# Patient Record
Sex: MALE | Race: WHITE | NOT HISPANIC OR LATINO | Employment: OTHER | URBAN - METROPOLITAN AREA
[De-identification: names, ages, dates, MRNs, and addresses within clinical notes are randomized per-mention and may not be internally consistent; named-entity substitution may affect disease eponyms.]

---

## 2017-12-01 ENCOUNTER — TRANSFERRED RECORDS (OUTPATIENT)
Dept: HEALTH INFORMATION MANAGEMENT | Facility: CLINIC | Age: 57
End: 2017-12-01

## 2020-03-18 ENCOUNTER — OFFICE VISIT (OUTPATIENT)
Dept: FAMILY MEDICINE | Facility: CLINIC | Age: 60
End: 2020-03-18
Payer: COMMERCIAL

## 2020-03-18 VITALS
WEIGHT: 251.6 LBS | OXYGEN SATURATION: 96 % | SYSTOLIC BLOOD PRESSURE: 133 MMHG | TEMPERATURE: 98.4 F | DIASTOLIC BLOOD PRESSURE: 81 MMHG | RESPIRATION RATE: 16 BRPM | BODY MASS INDEX: 37.26 KG/M2 | HEIGHT: 69 IN | HEART RATE: 93 BPM

## 2020-03-18 DIAGNOSIS — I10 HYPERTENSION GOAL BP (BLOOD PRESSURE) < 140/90: ICD-10-CM

## 2020-03-18 DIAGNOSIS — E66.01 MORBID OBESITY (H): ICD-10-CM

## 2020-03-18 DIAGNOSIS — I83.93 ASYMPTOMATIC VARICOSE VEINS OF BOTH LOWER EXTREMITIES: ICD-10-CM

## 2020-03-18 DIAGNOSIS — E78.5 HYPERLIPIDEMIA LDL GOAL <100: ICD-10-CM

## 2020-03-18 DIAGNOSIS — F95.8 BEHAVIORAL TIC: ICD-10-CM

## 2020-03-18 DIAGNOSIS — N52.8 OTHER MALE ERECTILE DYSFUNCTION: ICD-10-CM

## 2020-03-18 DIAGNOSIS — Z23 NEED FOR DIPHTHERIA-TETANUS-PERTUSSIS (TDAP) VACCINE: ICD-10-CM

## 2020-03-18 DIAGNOSIS — E11.42 TYPE 2 DIABETES MELLITUS WITH DIABETIC POLYNEUROPATHY, WITHOUT LONG-TERM CURRENT USE OF INSULIN (H): Primary | ICD-10-CM

## 2020-03-18 DIAGNOSIS — Z23 NEED FOR SHINGLES VACCINE: ICD-10-CM

## 2020-03-18 DIAGNOSIS — F32.4 MAJOR DEPRESSIVE DISORDER WITH SINGLE EPISODE, IN PARTIAL REMISSION (H): ICD-10-CM

## 2020-03-18 PROBLEM — E11.9 TYPE 2 DIABETES MELLITUS WITHOUT COMPLICATION, WITHOUT LONG-TERM CURRENT USE OF INSULIN (H): Status: ACTIVE | Noted: 2020-03-18

## 2020-03-18 LAB
ALBUMIN SERPL-MCNC: 3.5 G/DL (ref 3.4–5)
ALP SERPL-CCNC: 133 U/L (ref 40–150)
ALT SERPL W P-5'-P-CCNC: 28 U/L (ref 0–70)
ANION GAP SERPL CALCULATED.3IONS-SCNC: 6 MMOL/L (ref 3–14)
AST SERPL W P-5'-P-CCNC: 22 U/L (ref 0–45)
BILIRUB SERPL-MCNC: 0.5 MG/DL (ref 0.2–1.3)
BUN SERPL-MCNC: 19 MG/DL (ref 7–30)
CALCIUM SERPL-MCNC: 9.1 MG/DL (ref 8.5–10.1)
CHLORIDE SERPL-SCNC: 104 MMOL/L (ref 94–109)
CHOLEST SERPL-MCNC: 194 MG/DL
CO2 SERPL-SCNC: 28 MMOL/L (ref 20–32)
CREAT SERPL-MCNC: 0.9 MG/DL (ref 0.66–1.25)
CREAT UR-MCNC: 154 MG/DL
GFR SERPL CREATININE-BSD FRML MDRD: >90 ML/MIN/{1.73_M2}
GLUCOSE SERPL-MCNC: 158 MG/DL (ref 70–99)
HBA1C MFR BLD: 8.8 % (ref 0–5.6)
HDLC SERPL-MCNC: 42 MG/DL
LDLC SERPL CALC-MCNC: 110 MG/DL
MICROALBUMIN UR-MCNC: 11 MG/L
MICROALBUMIN/CREAT UR: 6.95 MG/G CR (ref 0–17)
NONHDLC SERPL-MCNC: 152 MG/DL
POTASSIUM SERPL-SCNC: 4.2 MMOL/L (ref 3.4–5.3)
PROT SERPL-MCNC: 7.5 G/DL (ref 6.8–8.8)
SODIUM SERPL-SCNC: 138 MMOL/L (ref 133–144)
TRIGL SERPL-MCNC: 209 MG/DL
TSH SERPL DL<=0.005 MIU/L-ACNC: 1.11 MU/L (ref 0.4–4)

## 2020-03-18 PROCEDURE — 90472 IMMUNIZATION ADMIN EACH ADD: CPT | Performed by: FAMILY MEDICINE

## 2020-03-18 PROCEDURE — 90715 TDAP VACCINE 7 YRS/> IM: CPT | Performed by: FAMILY MEDICINE

## 2020-03-18 PROCEDURE — 36415 COLL VENOUS BLD VENIPUNCTURE: CPT | Performed by: FAMILY MEDICINE

## 2020-03-18 PROCEDURE — 80061 LIPID PANEL: CPT | Performed by: FAMILY MEDICINE

## 2020-03-18 PROCEDURE — 80053 COMPREHEN METABOLIC PANEL: CPT | Performed by: FAMILY MEDICINE

## 2020-03-18 PROCEDURE — 83036 HEMOGLOBIN GLYCOSYLATED A1C: CPT | Performed by: FAMILY MEDICINE

## 2020-03-18 PROCEDURE — 90750 HZV VACC RECOMBINANT IM: CPT | Performed by: FAMILY MEDICINE

## 2020-03-18 PROCEDURE — 99204 OFFICE O/P NEW MOD 45 MIN: CPT | Mod: 25 | Performed by: FAMILY MEDICINE

## 2020-03-18 PROCEDURE — 84443 ASSAY THYROID STIM HORMONE: CPT | Performed by: FAMILY MEDICINE

## 2020-03-18 PROCEDURE — 82043 UR ALBUMIN QUANTITATIVE: CPT | Performed by: FAMILY MEDICINE

## 2020-03-18 PROCEDURE — 90471 IMMUNIZATION ADMIN: CPT | Performed by: FAMILY MEDICINE

## 2020-03-18 RX ORDER — DULAGLUTIDE 1.5 MG/.5ML
1.5 INJECTION, SOLUTION SUBCUTANEOUS
COMMUNITY
Start: 2020-03-06 | End: 2020-03-18

## 2020-03-18 RX ORDER — BUPROPION HYDROCHLORIDE 300 MG/1
300 TABLET ORAL EVERY MORNING
Qty: 90 TABLET | Refills: 1 | Status: SHIPPED | OUTPATIENT
Start: 2020-03-18 | End: 2020-09-11

## 2020-03-18 RX ORDER — LOSARTAN POTASSIUM 100 MG/1
100 TABLET ORAL DAILY
COMMUNITY
Start: 2020-03-06 | End: 2020-03-18

## 2020-03-18 RX ORDER — GLYBURIDE 5 MG/1
10 TABLET ORAL
Qty: 180 TABLET | Refills: 1 | Status: SHIPPED | OUTPATIENT
Start: 2020-03-18 | End: 2020-09-11

## 2020-03-18 RX ORDER — LOSARTAN POTASSIUM 100 MG/1
100 TABLET ORAL DAILY
Qty: 90 TABLET | Refills: 1 | Status: SHIPPED | OUTPATIENT
Start: 2020-03-18 | End: 2020-09-11

## 2020-03-18 RX ORDER — LOSARTAN POTASSIUM AND HYDROCHLOROTHIAZIDE 12.5; 1 MG/1; MG/1
1 TABLET ORAL DAILY
COMMUNITY
End: 2020-03-18

## 2020-03-18 RX ORDER — GLYBURIDE 5 MG/1
10 TABLET ORAL
COMMUNITY
Start: 2020-03-06 | End: 2020-03-18

## 2020-03-18 RX ORDER — ATORVASTATIN CALCIUM 40 MG/1
40 TABLET, FILM COATED ORAL EVERY EVENING
Qty: 90 TABLET | Refills: 1 | Status: SHIPPED | OUTPATIENT
Start: 2020-03-18 | End: 2020-11-24

## 2020-03-18 RX ORDER — OXYCODONE AND ACETAMINOPHEN 5; 325 MG/1; MG/1
1-2 TABLET ORAL
COMMUNITY
Start: 2018-12-04 | End: 2020-03-18

## 2020-03-18 RX ORDER — DULAGLUTIDE 1.5 MG/.5ML
1.5 INJECTION, SOLUTION SUBCUTANEOUS
Qty: 6 ML | Refills: 1 | Status: SHIPPED | OUTPATIENT
Start: 2020-03-18 | End: 2020-09-16

## 2020-03-18 RX ORDER — BUPROPION HYDROCHLORIDE 300 MG/1
300 TABLET ORAL EVERY MORNING
COMMUNITY
Start: 2020-03-06 | End: 2020-03-18

## 2020-03-18 RX ORDER — TADALAFIL 5 MG/1
5 TABLET ORAL DAILY
Qty: 90 TABLET | Refills: 1 | Status: SHIPPED | OUTPATIENT
Start: 2020-03-18 | End: 2021-09-30

## 2020-03-18 RX ORDER — DESVENLAFAXINE 100 MG/1
100 TABLET, EXTENDED RELEASE ORAL DAILY
COMMUNITY
Start: 2020-03-06 | End: 2020-03-18

## 2020-03-18 RX ORDER — ATORVASTATIN CALCIUM 40 MG/1
40 TABLET, FILM COATED ORAL EVERY EVENING
COMMUNITY
Start: 2020-02-06 | End: 2020-03-18

## 2020-03-18 RX ORDER — DESVENLAFAXINE 100 MG/1
100 TABLET, EXTENDED RELEASE ORAL DAILY
Qty: 90 TABLET | Refills: 1 | Status: SHIPPED | OUTPATIENT
Start: 2020-03-18 | End: 2020-09-11

## 2020-03-18 SDOH — HEALTH STABILITY: MENTAL HEALTH: HOW OFTEN DO YOU HAVE 6 OR MORE DRINKS ON ONE OCCASION?: WEEKLY

## 2020-03-18 SDOH — HEALTH STABILITY: MENTAL HEALTH: HOW MANY STANDARD DRINKS CONTAINING ALCOHOL DO YOU HAVE ON A TYPICAL DAY?: 1 OR 2

## 2020-03-18 ASSESSMENT — ANXIETY QUESTIONNAIRES
6. BECOMING EASILY ANNOYED OR IRRITABLE: NOT AT ALL
5. BEING SO RESTLESS THAT IT IS HARD TO SIT STILL: NOT AT ALL
2. NOT BEING ABLE TO STOP OR CONTROL WORRYING: NOT AT ALL
7. FEELING AFRAID AS IF SOMETHING AWFUL MIGHT HAPPEN: NOT AT ALL
IF YOU CHECKED OFF ANY PROBLEMS ON THIS QUESTIONNAIRE, HOW DIFFICULT HAVE THESE PROBLEMS MADE IT FOR YOU TO DO YOUR WORK, TAKE CARE OF THINGS AT HOME, OR GET ALONG WITH OTHER PEOPLE: SOMEWHAT DIFFICULT
3. WORRYING TOO MUCH ABOUT DIFFERENT THINGS: NOT AT ALL
GAD7 TOTAL SCORE: 3
1. FEELING NERVOUS, ANXIOUS, OR ON EDGE: NEARLY EVERY DAY

## 2020-03-18 ASSESSMENT — PATIENT HEALTH QUESTIONNAIRE - PHQ9
5. POOR APPETITE OR OVEREATING: NOT AT ALL
SUM OF ALL RESPONSES TO PHQ QUESTIONS 1-9: 2

## 2020-03-18 ASSESSMENT — MIFFLIN-ST. JEOR: SCORE: 1945.59

## 2020-03-18 ASSESSMENT — PAIN SCALES - GENERAL: PAINLEVEL: NO PAIN (0)

## 2020-03-18 NOTE — PROGRESS NOTES
"Chan Rodriguez is a 60 year old male who presents to clinic today for the following health issues:    HPI   New Patient/Transfer of Care  Pt doen't have any issues today     {HIST REVIEW/ LINKS 2 (Optional):662086}    {Additional problems for the provider to add (optional):112579}  Reviewed and updated as needed this visit by Provider         Review of Systems   {ROS COMP (Optional):427312}      Objective    There were no vitals taken for this visit.  There is no height or weight on file to calculate BMI.  Physical Exam   {Exam List (Optional):724260}    {Diagnostic Test Results (Optional):594088::\"Diagnostic Test Results:\",\"Labs reviewed in Epic\"}        {PROVIDER CHARTING PREFERENCE:942653}      "

## 2020-03-18 NOTE — NURSING NOTE
Prior to immunization administration, verified patients identity using patient s name and date of birth. Please see Immunization Activity for additional information.     Screening Questionnaire for Adult Immunization    Are you sick today?   No   Do you have allergies to medications, food, a vaccine component or latex?   No   Have you ever had a serious reaction after receiving a vaccination?   No   Do you have a long-term health problem with heart, lung, kidney, or metabolic disease (e.g., diabetes), asthma, a blood disorder, no spleen, complement component deficiency, a cochlear implant, or a spinal fluid leak?  Are you on long-term aspirin therapy?   Yes   Do you have cancer, leukemia, HIV/AIDS, or any other immune system problem?   No   Do you have a parent, brother, or sister with an immune system problem?   No   In the past 3 months, have you taken medications that affect  your immune system, such as prednisone, other steroids, or anticancer drugs; drugs for the treatment of rheumatoid arthritis, Crohn s disease, or psoriasis; or have you had radiation treatments?   No   Have you had a seizure, or a brain or other nervous system problem?   No   During the past year, have you received a transfusion of blood or blood    products, or been given immune (gamma) globulin or antiviral drug?   No   For women: Are you pregnant or is there a chance you could become       pregnant during the next month?   No   Have you received any vaccinations in the past 4 weeks?   No     Immunization questionnaire was positive for at least one answer.  Notified Dr. Phil Stubbs.        Per orders of Dr. Phil Stubbs, injection of TDaP and Shingrix given by Em Winters MA. Patient instructed to remain in clinic for 15 minutes afterwards, and to report any adverse reaction to me immediately.       Screening performed by Em Winters MA on 3/18/2020 at 4:45 PM.

## 2020-03-18 NOTE — PROGRESS NOTES
Subjective     Christopher Rodriguez is a 60 year old male who presents to clinic today for the following health issues:    HPI   New Patient/Transfer of Care  Diabetes Follow-up      How often are you checking your blood sugar? Not at all    What concerns do you have today about your diabetes? None     Do you have any of these symptoms? (Select all that apply)  Numbness in feet     Was out of Trulicity for a few months due to cost.  Has been back on it since January.              Hyperlipidemia Follow-Up      Are you regularly taking any medication or supplement to lower your cholesterol?   Yes- atorvastatin in the morning    Are you having muscle aches or other side effects that you think could be caused by your cholesterol lowering medication?  No    Hypertension Follow-up      Do you check your blood pressure regularly outside of the clinic? No     Are you following a low salt diet? No    Are your blood pressures ever more than 140 on the top number (systolic) OR more   than 90 on the bottom number (diastolic), for example 140/90? N    BP Readings from Last 2 Encounters:   03/18/20 133/81     No results found for: A1C, LDL    Depression Followup    How are you doing with your depression since your last visit? Improved     Are you having other symptoms that might be associated with depression? No    Have you had a significant life event?  No     Are you feeling anxious or having panic attacks?   No    Do you have any concerns with your use of alcohol or other drugs? No    Social History     Tobacco Use     Smoking status: Never Smoker     Smokeless tobacco: Never Used   Substance Use Topics     Alcohol use: Yes     Drinks per session: 1 or 2     Binge frequency: Weekly     Comment: sometimes      Drug use: Never     No flowsheet data found.  No flowsheet data found.    In the past two weeks have you had thoughts of suicide or self-harm?  No.    Do you have concerns about your personal safety or the safety of others?    "No    Suicide Assessment Five-step Evaluation and Treatment (SAFE-T)      Tic - present for months. Not changing and worse during stress.    ED - using daily cialis.  Not currently sexually active but stable on this medication.      Reviewed and updated as needed this visit by Provider  Tobacco  Allergies  Meds  Problems  Med Hx  Surg Hx  Fam Hx  Soc Hx          Review of Systems   ROS COMP: Constitutional, HEENT, cardiovascular, pulmonary, GI, , musculoskeletal, neuro, skin, endocrine and psych systems are negative, except as otherwise noted.      Objective    /81 (BP Location: Left arm, Patient Position: Chair, Cuff Size: Adult Large)   Pulse 93   Temp 98.4  F (36.9  C) (Oral)   Resp 16   Ht 1.759 m (5' 9.25\")   Wt 114.1 kg (251 lb 9.6 oz)   SpO2 96%   BMI 36.89 kg/m    Body mass index is 36.89 kg/m .  Physical Exam   GENERAL: healthy, alert and no distress  EYES: Eyes grossly normal to inspection, PERRL and conjunctivae and sclerae normal  HENT: ear canals and TM's normal, nose and mouth without ulcers or lesions  NECK: no adenopathy, no asymmetry, masses, or scars and thyroid normal to palpation  RESP: lungs clear to auscultation - no rales, rhonchi or wheezes  CV: regular rate and rhythm, normal S1 S2, no S3 or S4, no murmur, click or rub, no peripheral edema and peripheral pulses strong  ABDOMEN: soft, nontender, no hepatosplenomegaly, no masses and bowel sounds normal  MS: no gross musculoskeletal defects noted, no edema  SKIN: no suspicious lesions or rashes and varicosities - ankles  NEURO: Normal strength and tone, sensory exam grossly normal, mentation intact and nonsymmetrical facial tics noted.  PSYCH: mentation appears normal and affect flat  Diabetic foot exam: normal DP and PT pulses, no trophic changes or ulcerative lesions, normal sensory exam and normal monofilament exam    Diagnostic Test Results:  Labs reviewed in Epic        Assessment & Plan     1. Type 2 diabetes " mellitus with diabetic polyneuropathy, without long-term current use of insulin (H)  Uncertain control - check labs and refill current medications for now.  - Lipid panel reflex to direct LDL Non-fasting  - glyBURIDE (DIABETA /MICRONASE) 5 MG tablet; Take 2 tablets (10 mg) by mouth daily (with breakfast)  Dispense: 180 tablet; Refill: 1  - metFORMIN (GLUCOPHAGE) 1000 MG tablet; Take 1 tablet (1,000 mg) by mouth 2 times daily (with meals)  Dispense: 180 tablet; Refill: 1  - TRULICITY 1.5 MG/0.5ML pen; Inject 1.5 mg Subcutaneous every 7 days  Dispense: 6 mL; Refill: 1  - Comprehensive metabolic panel  - Hemoglobin A1c  - TSH with free T4 reflex  - Albumin Random Urine Quantitative with Creat Ratio    2. Morbid obesity (H)  Low carb diet recommended    3. Major depressive disorder with single episode, in partial remission (H)  Stable - continue current treatment  - buPROPion (WELLBUTRIN XL) 300 MG 24 hr tablet; Take 1 tablet (300 mg) by mouth every morning  Dispense: 90 tablet; Refill: 1  - desvenlafaxine (PRISTIQ) 100 MG 24 hr tablet; Take 1 tablet (100 mg) by mouth daily  Dispense: 90 tablet; Refill: 1    4. Hypertension goal BP (blood pressure) < 140/90  Controlled - continue current treatment and check labs  - losartan (COZAAR) 100 MG tablet; Take 1 tablet (100 mg) by mouth daily  Dispense: 90 tablet; Refill: 1  - Comprehensive metabolic panel    5. Hyperlipidemia LDL goal <100  Stable - refill and check labs. Recommended this be taken in the evening.  - Lipid panel reflex to direct LDL Non-fasting  - atorvastatin (LIPITOR) 40 MG tablet; Take 1 tablet (40 mg) by mouth every evening  Dispense: 90 tablet; Refill: 1  - Comprehensive metabolic panel    6. Behavioral tic  Monitor    7. Other male erectile dysfunction  Stable - refill and check labs.  - tadalafil (CIALIS) 5 MG tablet; Take 1 tablet (5 mg) by mouth daily  Dispense: 90 tablet; Refill: 1  - Comprehensive metabolic panel    8. Asymptomatic varicose veins of  "both lower extremities  Monitor    9. Need for diphtheria-tetanus-pertussis (Tdap) vaccine    - TDAP, IM (10 - 64 YRS) - Adacel    10. Need for shingles vaccine    - ZOSTER VACCINE RECOMBINANT ADJUVANTED IM NJX     BMI:   Estimated body mass index is 36.89 kg/m  as calculated from the following:    Height as of this encounter: 1.759 m (5' 9.25\").    Weight as of this encounter: 114.1 kg (251 lb 9.6 oz).   Weight management plan: Discussed healthy diet and exercise guidelines    The uses and side effects, including black box warnings as appropriate, were discussed in detail.  All patient questions were answered.  The patient was instructed to call immediately if any side effects developed.     Return in about 6 months (around 9/18/2020).    Su Stubbs MD  [facility]      "

## 2020-03-18 NOTE — PATIENT INSTRUCTIONS
At [facility], we strive to deliver an exceptional experience to you, every time we see you. If you receive a survey, please complete it as we do value your feedback.  If you have MyChart, you can expect to receive results automatically within 24 hours of their completion.  Your provider will send a note interpreting your results as well.   If you do not have MyChart, you should receive your results in about a week by mail.    Your care team:                            Family Medicine Internal Medicine   MD Jose Esparza MD Shantel Branch-Fleming, MD Katya Georgiev PA-C Megan Hill, APRIRIS Ndiaye, MD Pediatrics   Mikey Shipley, PATAMMY Blake, MD Layla Wen APRN CNP   MD Marjorie Aguilar MD Deborah Mielke, MD Kim Thein, APRN [facility]      Clinic hours: Monday - Thursday 7 am-7 pm; Fridays 7 am-5 pm.   Urgent care: Monday - Friday 11 am-9 pm; Saturday and Sunday 9 am-5 pm.    Clinic: [phone]       [facility]: Monday - Thursday 8 am - 7 pm; Friday 8 am - 6 pm  [facility]: [phone]     Use [email or web address] for 24/7 diagnosis and treatment of dozens of conditions.

## 2020-03-18 NOTE — LETTER
March 19, 2020      Christopher Michael  [address] [place] [address]        Mr. Rodriguez,     Your diabetes is above goal.  Continue the Trulicity you just restarted and decrease your bread, rice, pasta, potatoes and sugar intake.  We should recheck your labs in 3 months.   Your thyroid, liver, kidneys and cholesterol tests were normal for you.     Please contact the clinic if you have additional questions.  Thank you.     Sincerely,     Su Stubbs MD     Resulted Orders   Lipid panel reflex to direct LDL Non-fasting   Result Value Ref Range    Cholesterol 194 <200 mg/dL    Triglycerides 209 (H) <150 mg/dL      Comment:      Borderline high:  150-199 mg/dl  High:             200-499 mg/dl  Very high:       >499 mg/dl  Non Fasting      HDL Cholesterol 42 >39 mg/dL    LDL Cholesterol Calculated 110 (H) <100 mg/dL      Comment:      Above desirable:  100-129 mg/dl  Borderline High:  130-159 mg/dL  High:             160-189 mg/dL  Very high:       >189 mg/dl      Non HDL Cholesterol 152 (H) <130 mg/dL      Comment:      Above Desirable:  130-159 mg/dl  Borderline high:  160-189 mg/dl  High:             190-219 mg/dl  Very high:       >219 mg/dl     Comprehensive metabolic panel   Result Value Ref Range    Sodium 138 133 - 144 mmol/L    Potassium 4.2 3.4 - 5.3 mmol/L    Chloride 104 94 - 109 mmol/L    Carbon Dioxide 28 20 - 32 mmol/L    Anion Gap 6 3 - 14 mmol/L    Glucose 158 (H) 70 - 99 mg/dL      Comment:      Non Fasting    Urea Nitrogen 19 7 - 30 mg/dL    Creatinine 0.90 0.66 - 1.25 mg/dL    GFR Estimate >90 >60 mL/min/[1.73_m2]      Comment:      Non  GFR Calc  Starting 12/18/2018, serum creatinine based estimated GFR (eGFR) will be   calculated using the Chronic Kidney Disease Epidemiology Collaboration   (CKD-EPI) equation.      GFR Estimate If Black >90 >60 mL/min/[1.73_m2]      Comment:       GFR Calc  Starting 12/18/2018, serum creatinine based estimated  GFR (eGFR) will be   calculated using the Chronic Kidney Disease Epidemiology Collaboration   (CKD-EPI) equation.      Calcium 9.1 8.5 - 10.1 mg/dL    Bilirubin Total 0.5 0.2 - 1.3 mg/dL    Albumin 3.5 3.4 - 5.0 g/dL    Protein Total 7.5 6.8 - 8.8 g/dL    Alkaline Phosphatase 133 40 - 150 U/L    ALT 28 0 - 70 U/L    AST 22 0 - 45 U/L   Hemoglobin A1c   Result Value Ref Range    Hemoglobin A1C 8.8 (H) 0 - 5.6 %      Comment:      Results confirmed by repeat test  Normal <5.7% Prediabetes 5.7-6.4%  Diabetes 6.5% or higher - adopted from ADA   consensus guidelines.     TSH with free T4 reflex   Result Value Ref Range    TSH 1.11 0.40 - 4.00 mU/L   Albumin Random Urine Quantitative with Creat Ratio   Result Value Ref Range    Creatinine Urine 154 mg/dL    Albumin Urine mg/L 11 mg/L    Albumin Urine mg/g Cr 6.95 0 - 17 mg/g Cr       If you have any questions or concerns, please call the clinic at the number listed above.       Sincerely,        Su Stubbs MD

## 2020-03-19 ASSESSMENT — ANXIETY QUESTIONNAIRES: GAD7 TOTAL SCORE: 3

## 2020-03-19 NOTE — RESULT ENCOUNTER NOTE
Mr. Rodriguez,    Your diabetes is above goal.  Continue the Trulicity you just restarted and decrease your bread, rice, pasta, potatoes and sugar intake.  We should recheck your labs in 3 months.  Your thyroid, liver, kidneys and cholesterol tests were normal for you.    Please contact the clinic if you have additional questions.  Thank you.    Sincerely,    Su Stubbs MD

## 2020-09-09 DIAGNOSIS — I10 HYPERTENSION GOAL BP (BLOOD PRESSURE) < 140/90: ICD-10-CM

## 2020-09-09 DIAGNOSIS — F32.4 MAJOR DEPRESSIVE DISORDER WITH SINGLE EPISODE, IN PARTIAL REMISSION (H): ICD-10-CM

## 2020-09-09 DIAGNOSIS — E11.42 TYPE 2 DIABETES MELLITUS WITH DIABETIC POLYNEUROPATHY, WITHOUT LONG-TERM CURRENT USE OF INSULIN (H): ICD-10-CM

## 2020-09-11 RX ORDER — BUPROPION HYDROCHLORIDE 300 MG/1
TABLET ORAL
Qty: 90 TABLET | Refills: 0 | Status: SHIPPED | OUTPATIENT
Start: 2020-09-11 | End: 2020-11-24

## 2020-09-11 RX ORDER — GLYBURIDE 5 MG/1
10 TABLET ORAL
Qty: 60 TABLET | Refills: 0 | Status: SHIPPED | OUTPATIENT
Start: 2020-09-11 | End: 2020-11-25

## 2020-09-11 RX ORDER — DESVENLAFAXINE 100 MG/1
TABLET, EXTENDED RELEASE ORAL
Qty: 90 TABLET | Refills: 1 | Status: SHIPPED | OUTPATIENT
Start: 2020-09-11 | End: 2020-12-28

## 2020-09-11 RX ORDER — LOSARTAN POTASSIUM 100 MG/1
TABLET ORAL
Qty: 90 TABLET | Refills: 1 | Status: SHIPPED | OUTPATIENT
Start: 2020-09-11 | End: 2020-12-28

## 2020-09-11 NOTE — TELEPHONE ENCOUNTER
"For metformin and glyburide:  Routing refill request to provider for review/approval because:  Labs not current:  A1C    For Pristiq, bupropion and Losartan:  Prescription approved per [facility] Refill Protocol.        Requested Prescriptions   Pending Prescriptions Disp Refills     desvenlafaxine (PRISTIQ) 100 MG 24 hr tablet [Pharmacy Med Name: DESVENLAFAXINE SUCC ER 100MG] 90 tablet 1     Sig: TAKE ONE TABLET BY MOUTH EVERY DAY       Serotonin-Norepinephrine Reuptake Inhibitors  Passed - 9/11/2020  1:32 PM        Passed - Blood pressure under 140/90 in past 12 months     BP Readings from Last 3 Encounters:   03/18/20 133/81                 Passed - PHQ-9 score of less than 5 in past 6 months     Please review last PHQ-9 score.           Passed - Medication is active on med list        Passed - Patient is age 18 or older        Passed - Normal serum creatinine on file in past 12 months     Recent Labs   Lab Test 03/18/20  1641   CR 0.90       Ok to refill medication if creatinine is low          Passed - Recent (6 mo) or future (30 days) visit within the authorizing provider's specialty     Patient had office visit in the last 6 months or has a visit in the next 30 days with authorizing provider or within the authorizing provider's specialty.  See \"Patient Info\" tab in inbasket, or \"Choose Columns\" in Meds & Orders section of the refill encounter.               glyBURIDE (DIABETA /MICRONASE) 5 MG tablet [Pharmacy Med Name: GLYBURIDE 5MG TABS] 180 tablet 1     Sig: TAKE TWO TABLETS BY MOUTH EVERY DAY WITH BREAKFAST       Sulfonylurea Agents Failed - 9/11/2020  1:32 PM        Failed - Patient has documented A1c within the specified period of time.     If HgbA1C is 8 or greater, it needs to be on file within the past 3 months.  If less than 8, must be on file within the past 6 months.     Recent Labs   Lab Test 03/18/20  1641   A1C 8.8*             Passed - Medication is active on med list        Passed - Patient is age 18 " "or older        Passed - Patient has a recent creatinine (normal) within the past 12 mos.     Recent Labs   Lab Test 03/18/20  1641   CR 0.90       Ok to refill medication if creatinine is low          Passed - Recent (6 mo) or future (30 days) visit within the authorizing provider's specialty     Patient had office visit in the last 6 months or has a visit in the next 30 days with authorizing provider or within the authorizing provider's specialty.  See \"Patient Info\" tab in inbasket, or \"Choose Columns\" in Meds & Orders section of the refill encounter.               buPROPion (WELLBUTRIN XL) 300 MG 24 hr tablet [Pharmacy Med Name: BUPROPION HCL ER (XL) 300MG TB24] 90 tablet 1     Sig: TAKE ONE TABLET BY MOUTH EVERY MORNING       SS[facility]Is Protocol Passed - 9/11/2020  1:32 PM        Passed - PHQ-9 score less than 5 in past 6 months     Please review last PHQ-9 score.           Passed - Medication is Bupropion     If the medication is Bupropion (Wellbutrin), and the patient is taking for smoking cessation; OK to refill.          Passed - Medication is active on med list        Passed - Patient is age 18 or older        Passed - Recent (6 mo) or future (30 days) visit within the authorizing provider's specialty     Patient had office visit in the last 6 months or has a visit in the next 30 days with authorizing provider or within the authorizing provider's specialty.  See \"Patient Info\" tab in inbasket, or \"Choose Columns\" in Meds & Orders section of the refill encounter.               losartan (COZAAR) 100 MG tablet [Pharmacy Med Name: LOSARTAN 100MG TAB] 90 tablet 1     Sig: TAKE ONE TABLET BY MOUTH EVERY DAY       Angiotensin-II Receptors Passed - 9/11/2020  1:32 PM        Passed - Last blood pressure under 140/90 in past 12 months     BP Readings from Last 3 Encounters:   03/18/20 133/81                 Passed - Recent (12 mo) or future (30 days) visit within the authorizing provider's specialty     Patient has had " "an office visit with the authorizing provider or a provider within the authorizing providers department within the previous 12 mos or has a future within next 30 days. See \"Patient Info\" tab in inbasket, or \"Choose Columns\" in Meds & Orders section of the refill encounter.              Passed - Medication is active on med list        Passed - Patient is age 18 or older        Passed - Normal serum creatinine on file in past 12 months     Recent Labs   Lab Test 03/18/20  1641   CR 0.90       Ok to refill medication if creatinine is low          Passed - Normal serum potassium on file in past 12 months     Recent Labs   Lab Test 03/18/20  1641   POTASSIUM 4.2                       metFORMIN (GLUCOPHAGE) 1000 MG tablet [Pharmacy Med Name: METFORMIN HCL 1000MG TABS] 180 tablet 1     Sig: TAKE ONE TABLET BY MOUTH TWICE A DAY WITH MEALS       Biguanide Agents Failed - 9/9/2020 10:49 AM        Failed - Patient has documented A1c within the specified period of time.     If HgbA1C is 8 or greater, it needs to be on file within the past 3 months.  If less than 8, must be on file within the past 6 months.     Recent Labs   Lab Test 03/18/20  1641   A1C 8.8*             Passed - Patient is age 10 or older        Passed - Patient's CR is NOT>1.4 OR Patient's EGFR is NOT<45 within past 12 mos.     Recent Labs   Lab Test 03/18/20  1641   GFRESTIMATED >90   GFRESTBLACK >90       Recent Labs   Lab Test 03/18/20  1641   CR 0.90             Passed - Patient does NOT have a diagnosis of CHF.        Passed - Medication is active on med list        Passed - Recent (6 mo) or future (30 days) visit within the authorizing provider's specialty     Patient had office visit in the last 6 months or has a visit in the next 30 days with authorizing provider or within the authorizing provider's specialty.  See \"Patient Info\" tab in inbasket, or \"Choose Columns\" in Meds & Orders section of the refill encounter.                 PHQ 3/18/2020   PHQ-9 " Total Score 2   Q9: Thoughts of better off dead/self-harm past 2 weeks Not at all           Selma Mcclellan RN, BSN, PHN

## 2020-09-14 DIAGNOSIS — E11.42 TYPE 2 DIABETES MELLITUS WITH DIABETIC POLYNEUROPATHY, WITHOUT LONG-TERM CURRENT USE OF INSULIN (H): ICD-10-CM

## 2020-09-16 RX ORDER — GLYBURIDE 5 MG/1
TABLET ORAL
Qty: 180 TABLET | Refills: 1 | OUTPATIENT
Start: 2020-09-16

## 2020-09-16 RX ORDER — DULAGLUTIDE 1.5 MG/.5ML
INJECTION, SOLUTION SUBCUTANEOUS
Qty: 6 ML | Refills: 0 | Status: SHIPPED | OUTPATIENT
Start: 2020-09-16 | End: 2020-11-29

## 2020-09-16 NOTE — TELEPHONE ENCOUNTER
Routing refill request to provider for review/approval because:  Labs not current:  A1C  Visit is also not current.    Pavithra Gross RN, [facility] Triage

## 2020-11-23 DIAGNOSIS — F32.4 MAJOR DEPRESSIVE DISORDER WITH SINGLE EPISODE, IN PARTIAL REMISSION (H): ICD-10-CM

## 2020-11-23 DIAGNOSIS — E11.42 TYPE 2 DIABETES MELLITUS WITH DIABETIC POLYNEUROPATHY, WITHOUT LONG-TERM CURRENT USE OF INSULIN (H): ICD-10-CM

## 2020-11-23 DIAGNOSIS — E78.5 HYPERLIPIDEMIA LDL GOAL <100: ICD-10-CM

## 2020-11-24 RX ORDER — BUPROPION HYDROCHLORIDE 300 MG/1
TABLET ORAL
Qty: 90 TABLET | Refills: 0 | Status: SHIPPED | OUTPATIENT
Start: 2020-11-24 | End: 2020-12-28

## 2020-11-24 RX ORDER — ATORVASTATIN CALCIUM 40 MG/1
TABLET, FILM COATED ORAL
Qty: 90 TABLET | Refills: 0 | Status: SHIPPED | OUTPATIENT
Start: 2020-11-24 | End: 2020-12-28

## 2020-11-25 DIAGNOSIS — E11.42 TYPE 2 DIABETES MELLITUS WITH DIABETIC POLYNEUROPATHY, WITHOUT LONG-TERM CURRENT USE OF INSULIN (H): ICD-10-CM

## 2020-11-25 RX ORDER — GLYBURIDE 5 MG/1
10 TABLET ORAL
Qty: 60 TABLET | Refills: 0 | Status: SHIPPED | OUTPATIENT
Start: 2020-11-25 | End: 2020-12-28

## 2020-11-25 NOTE — TELEPHONE ENCOUNTER
Prescription [place] per [facility] [place]. - Atorvastatin    Please schedule patient. Carrillo refill given. - Bupropion   Needs appointment for further refills.       Routing refill request to provider for review/approval because:  Unclear if Metformin and Glyburide carrillo already given? Routed to provider because failed labs and visit but no outreach to patient to get scheduled. Please clarify.     Priscila Loyd RN  [facility]

## 2020-11-29 RX ORDER — DULAGLUTIDE 1.5 MG/.5ML
INJECTION, SOLUTION SUBCUTANEOUS
Qty: 3 ML | Refills: 0 | Status: SHIPPED | OUTPATIENT
Start: 2020-11-29 | End: 2020-12-28

## 2020-11-29 NOTE — TELEPHONE ENCOUNTER
Routing refill request to provider for review/approval because:  Not current:  Visit and A1C  Priscila Loyd RN  [facility]

## 2020-12-15 ENCOUNTER — DOCUMENTATION ONLY (OUTPATIENT)
Dept: FAMILY MEDICINE | Facility: CLINIC | Age: 60
End: 2020-12-15

## 2020-12-15 DIAGNOSIS — I10 HYPERTENSION GOAL BP (BLOOD PRESSURE) < 140/90: ICD-10-CM

## 2020-12-15 DIAGNOSIS — E78.5 HYPERLIPIDEMIA LDL GOAL <100: ICD-10-CM

## 2020-12-15 DIAGNOSIS — E11.42 TYPE 2 DIABETES MELLITUS WITH DIABETIC POLYNEUROPATHY, WITHOUT LONG-TERM CURRENT USE OF INSULIN (H): Primary | ICD-10-CM

## 2020-12-17 DIAGNOSIS — I10 HYPERTENSION GOAL BP (BLOOD PRESSURE) < 140/90: ICD-10-CM

## 2020-12-17 DIAGNOSIS — E78.5 HYPERLIPIDEMIA LDL GOAL <100: ICD-10-CM

## 2020-12-17 DIAGNOSIS — E11.42 TYPE 2 DIABETES MELLITUS WITH DIABETIC POLYNEUROPATHY, WITHOUT LONG-TERM CURRENT USE OF INSULIN (H): ICD-10-CM

## 2020-12-17 LAB
ALBUMIN SERPL-MCNC: 3.6 G/DL (ref 3.4–5)
ALP SERPL-CCNC: 102 U/L (ref 40–150)
ALT SERPL W P-5'-P-CCNC: 35 U/L (ref 0–70)
ANION GAP SERPL CALCULATED.3IONS-SCNC: 4 MMOL/L (ref 3–14)
AST SERPL W P-5'-P-CCNC: 23 U/L (ref 0–45)
BILIRUB SERPL-MCNC: 0.6 MG/DL (ref 0.2–1.3)
BUN SERPL-MCNC: 16 MG/DL (ref 7–30)
CALCIUM SERPL-MCNC: 8.8 MG/DL (ref 8.5–10.1)
CHLORIDE SERPL-SCNC: 103 MMOL/L (ref 94–109)
CHOLEST SERPL-MCNC: 215 MG/DL
CO2 SERPL-SCNC: 30 MMOL/L (ref 20–32)
CREAT SERPL-MCNC: 0.92 MG/DL (ref 0.66–1.25)
GFR SERPL CREATININE-BSD FRML MDRD: 90 ML/MIN/{1.73_M2}
GLUCOSE SERPL-MCNC: 136 MG/DL (ref 70–99)
HBA1C MFR BLD: 7.6 % (ref 0–5.6)
HDLC SERPL-MCNC: 47 MG/DL
LDLC SERPL CALC-MCNC: 130 MG/DL
NONHDLC SERPL-MCNC: 168 MG/DL
POTASSIUM SERPL-SCNC: 4.2 MMOL/L (ref 3.4–5.3)
PROT SERPL-MCNC: 7.3 G/DL (ref 6.8–8.8)
SODIUM SERPL-SCNC: 137 MMOL/L (ref 133–144)
TRIGL SERPL-MCNC: 190 MG/DL

## 2020-12-17 PROCEDURE — 36415 COLL VENOUS BLD VENIPUNCTURE: CPT | Performed by: FAMILY MEDICINE

## 2020-12-17 PROCEDURE — 83036 HEMOGLOBIN GLYCOSYLATED A1C: CPT | Performed by: FAMILY MEDICINE

## 2020-12-17 PROCEDURE — 80053 COMPREHEN METABOLIC PANEL: CPT | Performed by: FAMILY MEDICINE

## 2020-12-17 PROCEDURE — 80061 LIPID PANEL: CPT | Performed by: FAMILY MEDICINE

## 2020-12-17 NOTE — LETTER
December 18, 2020      Christopher Rodriguez  [address] [place] [address]        Mr. Rodriguez,     All of your labs were normal for you.   Your diabetes has improved as well.     Please contact the clinic if you have additional questions.  Thank you.     Sincerely,     Su Stubbs MD     Resulted Orders   **Comprehensive metabolic panel FUTURE anytime   Result Value Ref Range    Sodium 137 133 - 144 mmol/L    Potassium 4.2 3.4 - 5.3 mmol/L    Chloride 103 94 - 109 mmol/L    Carbon Dioxide 30 20 - 32 mmol/L    Anion Gap 4 3 - 14 mmol/L    Glucose 136 (H) 70 - 99 mg/dL      Comment:      Fasting specimen    Urea Nitrogen 16 7 - 30 mg/dL    Creatinine 0.92 0.66 - 1.25 mg/dL    GFR Estimate 90 >60 mL/min/[1.73_m2]      Comment:      Non  GFR Calc  Starting 12/18/2018, serum creatinine based estimated GFR (eGFR) will be   calculated using the Chronic Kidney Disease Epidemiology Collaboration   (CKD-EPI) equation.      GFR Estimate If Black >90 >60 mL/min/[1.73_m2]      Comment:       GFR Calc  Starting 12/18/2018, serum creatinine based estimated GFR (eGFR) will be   calculated using the Chronic Kidney Disease Epidemiology Collaboration   (CKD-EPI) equation.      Calcium 8.8 8.5 - 10.1 mg/dL    Bilirubin Total 0.6 0.2 - 1.3 mg/dL    Albumin 3.6 3.4 - 5.0 g/dL    Protein Total 7.3 6.8 - 8.8 g/dL    Alkaline Phosphatase 102 40 - 150 U/L    ALT 35 0 - 70 U/L    AST 23 0 - 45 U/L   Lipid panel reflex to direct LDL Fasting   Result Value Ref Range    Cholesterol 215 (H) <200 mg/dL      Comment:      Desirable:       <200 mg/dl    Triglycerides 190 (H) <150 mg/dL      Comment:      Borderline high:  150-199 mg/dl  High:             200-499 mg/dl  Very high:       >499 mg/dl  Fasting specimen      HDL Cholesterol 47 >39 mg/dL    LDL Cholesterol Calculated 130 (H) <100 mg/dL      Comment:      Above desirable:  100-129 mg/dl  Borderline High:  130-159 mg/dL  High:              160-189 mg/dL  Very high:       >189 mg/dl      Non HDL Cholesterol 168 (H) <130 mg/dL      Comment:      Above Desirable:  130-159 mg/dl  Borderline high:  160-189 mg/dl  High:             190-219 mg/dl  Very high:       >219 mg/dl     Hemoglobin A1c   Result Value Ref Range    Hemoglobin A1C 7.6 (H) 0 - 5.6 %      Comment:      Normal <5.7% Prediabetes 5.7-6.4%  Diabetes 6.5% or higher - adopted from ADA   consensus guidelines.

## 2020-12-17 NOTE — RESULT ENCOUNTER NOTE
Mr. Rodriguez,    All of your labs were normal for you.  Your diabetes has improved as well.    Please contact the clinic if you have additional questions.  Thank you.    Sincerely,    Su Stubbs MD

## 2020-12-28 ENCOUNTER — VIRTUAL VISIT (OUTPATIENT)
Dept: FAMILY MEDICINE | Facility: CLINIC | Age: 60
End: 2020-12-28
Payer: COMMERCIAL

## 2020-12-28 DIAGNOSIS — E78.5 HYPERLIPIDEMIA LDL GOAL <100: ICD-10-CM

## 2020-12-28 DIAGNOSIS — I10 HYPERTENSION GOAL BP (BLOOD PRESSURE) < 140/90: ICD-10-CM

## 2020-12-28 DIAGNOSIS — N52.8 OTHER MALE ERECTILE DYSFUNCTION: ICD-10-CM

## 2020-12-28 DIAGNOSIS — F32.4 MAJOR DEPRESSIVE DISORDER WITH SINGLE EPISODE, IN PARTIAL REMISSION (H): ICD-10-CM

## 2020-12-28 DIAGNOSIS — E11.42 TYPE 2 DIABETES MELLITUS WITH DIABETIC POLYNEUROPATHY, WITHOUT LONG-TERM CURRENT USE OF INSULIN (H): ICD-10-CM

## 2020-12-28 PROCEDURE — 99213 OFFICE O/P EST LOW 20 MIN: CPT | Mod: 95 | Performed by: NURSE PRACTITIONER

## 2020-12-28 RX ORDER — GLYBURIDE 5 MG/1
10 TABLET ORAL
Qty: 180 TABLET | Refills: 1 | Status: SHIPPED | OUTPATIENT
Start: 2020-12-28 | End: 2021-06-23

## 2020-12-28 RX ORDER — DULAGLUTIDE 1.5 MG/.5ML
INJECTION, SOLUTION SUBCUTANEOUS
Refills: 0 | OUTPATIENT
Start: 2020-12-28

## 2020-12-28 RX ORDER — ATORVASTATIN CALCIUM 40 MG/1
TABLET, FILM COATED ORAL
Qty: 90 TABLET | Refills: 0 | OUTPATIENT
Start: 2020-12-28

## 2020-12-28 RX ORDER — LOSARTAN POTASSIUM 100 MG/1
TABLET ORAL
Qty: 90 TABLET | Refills: 1 | OUTPATIENT
Start: 2020-12-28

## 2020-12-28 RX ORDER — BUPROPION HYDROCHLORIDE 300 MG/1
300 TABLET ORAL EVERY MORNING
Qty: 90 TABLET | Refills: 1 | Status: SHIPPED | OUTPATIENT
Start: 2020-12-28 | End: 2021-09-21

## 2020-12-28 RX ORDER — BUPROPION HYDROCHLORIDE 300 MG/1
TABLET ORAL
Qty: 90 TABLET | Refills: 0 | OUTPATIENT
Start: 2020-12-28

## 2020-12-28 RX ORDER — DESVENLAFAXINE 100 MG/1
TABLET, EXTENDED RELEASE ORAL
Qty: 90 TABLET | Refills: 1 | OUTPATIENT
Start: 2020-12-28

## 2020-12-28 RX ORDER — LOSARTAN POTASSIUM 100 MG/1
100 TABLET ORAL DAILY
Qty: 90 TABLET | Refills: 1 | Status: SHIPPED | OUTPATIENT
Start: 2020-12-28 | End: 2021-09-21

## 2020-12-28 RX ORDER — ATORVASTATIN CALCIUM 40 MG/1
40 TABLET, FILM COATED ORAL EVERY MORNING
Qty: 90 TABLET | Refills: 1 | Status: SHIPPED | OUTPATIENT
Start: 2020-12-28 | End: 2021-09-21

## 2020-12-28 RX ORDER — GLYBURIDE 5 MG/1
TABLET ORAL
Qty: 60 TABLET | Refills: 0 | OUTPATIENT
Start: 2020-12-28

## 2020-12-28 RX ORDER — DULAGLUTIDE 1.5 MG/.5ML
1.5 INJECTION, SOLUTION SUBCUTANEOUS
Qty: 6 ML | Refills: 1 | Status: SHIPPED | OUTPATIENT
Start: 2020-12-28 | End: 2021-06-23

## 2020-12-28 RX ORDER — DESVENLAFAXINE 100 MG/1
100 TABLET, EXTENDED RELEASE ORAL DAILY
Qty: 90 TABLET | Refills: 1 | Status: SHIPPED | OUTPATIENT
Start: 2020-12-28 | End: 2021-09-21

## 2020-12-28 ASSESSMENT — ANXIETY QUESTIONNAIRES
6. BECOMING EASILY ANNOYED OR IRRITABLE: NOT AT ALL
2. NOT BEING ABLE TO STOP OR CONTROL WORRYING: NOT AT ALL
GAD7 TOTAL SCORE: 0
IF YOU CHECKED OFF ANY PROBLEMS ON THIS QUESTIONNAIRE, HOW DIFFICULT HAVE THESE PROBLEMS MADE IT FOR YOU TO DO YOUR WORK, TAKE CARE OF THINGS AT HOME, OR GET ALONG WITH OTHER PEOPLE: SOMEWHAT DIFFICULT
1. FEELING NERVOUS, ANXIOUS, OR ON EDGE: NOT AT ALL
7. FEELING AFRAID AS IF SOMETHING AWFUL MIGHT HAPPEN: NOT AT ALL
3. WORRYING TOO MUCH ABOUT DIFFERENT THINGS: NOT AT ALL
5. BEING SO RESTLESS THAT IT IS HARD TO SIT STILL: NOT AT ALL

## 2020-12-28 ASSESSMENT — PATIENT HEALTH QUESTIONNAIRE - PHQ9
5. POOR APPETITE OR OVEREATING: NOT AT ALL
SUM OF ALL RESPONSES TO PHQ QUESTIONS 1-9: 1

## 2020-12-28 NOTE — PROGRESS NOTES
"Christopher Rodriguez is a 60 year old male who is being evaluated via a billable video visit.      The patient has been notified of following:     \"This video visit will be conducted via a call between you and your physician/provider. We have found that certain health care needs can be provided without the need for an in-person physical exam.  This service lets us provide the care you need with a video conversation.  If a prescription is necessary we can send it directly to your pharmacy.  If lab work is needed we can place an order for that and you can then stop by our lab to have the test done at a later time.    Video visits are billed at different rates depending on your insurance coverage.  Please reach out to your insurance provider with any questions.    If during the course of the call the physician/provider feels a video visit is not appropriate, you will not be charged for this service.\"    Patient has given verbal consent for Video visit? Yes  How would you like to obtain your AVS? Mail a copy  If you are dropped from the video visit, the video invite should be resent to: Text to cell phone: [phone]  Will anyone else be joining your video visit? No    Subjective     Christopher Rodriguez is a 60 year old male who presents today via video visit for the following health issues:    HPI     Diabetes Follow-up      How often are you checking your blood sugar? Not at all    What concerns do you have today about your diabetes? None     Do you have any of these symptoms? (Select all that apply)  Numbness in feet - not new    Have you had a diabetic eye exam in the last 12 months? No        BP Readings from Last 2 Encounters:   03/18/20 133/81     Hemoglobin A1C (%)   Date Value   12/17/2020 7.6 (H)   03/18/2020 8.8 (H)     LDL Cholesterol Calculated (mg/dL)   Date Value   12/17/2020 130 (H)   03/18/2020 110 (H)                 How many servings of fruits and vegetables do you eat daily?  0-1    On average, how many " sweetened beverages do you drink each day (Examples: soda, juice, sweet tea, etc.  Do NOT count diet or artificially sweetened beverages)?   1    How many days per week do you exercise enough to make your heart beat faster? 3 or less    How many minutes a day do you exercise enough to make your heart beat faster? 9 or less    How many days per week do you miss taking your medication? 0         Video Start Time: 4:05 pm    Hx depression  Doing well on Pristiq and wellbutrin  No thoughts to harm self or others  Feels safe    Had labs last week but then missed appointment with PCP    Review of Systems   Constitutional, HEENT, cardiovascular, pulmonary, GI, , musculoskeletal, neuro, skin, endocrine and psych systems are negative, except as otherwise noted.      Objective           Vitals:  No vitals were obtained today due to virtual visit.    Physical Exam     GENERAL: Healthy, alert and no distress  EYES: Eyes grossly normal to inspection.  No discharge or erythema, or obvious scleral/conjunctival abnormalities.  RESP: No audible wheeze, cough, or visible cyanosis.  No visible retractions or increased work of breathing.    SKIN: Visible skin clear. No significant rash, abnormal pigmentation or lesions.  NEURO: Cranial nerves grossly intact.  Mentation and speech appropriate for age.  PSYCH: Mentation appears normal, affect normal/bright, judgement and insight intact, normal speech and appearance well-groomed.      No results found for any visits on 12/28/20.        Assessment & Plan     Hyperlipidemia LDL goal <100  Refilled.  - atorvastatin (LIPITOR) 40 MG tablet; Take 1 tablet (40 mg) by mouth every morning    Major depressive disorder with single episode, in partial remission (H)  No red flags. Refilled.   - buPROPion (WELLBUTRIN XL) 300 MG 24 hr tablet; Take 1 tablet (300 mg) by mouth every morning  - desvenlafaxine (PRISTIQ) 100 MG 24 hr tablet; Take 1 tablet (100 mg) by mouth daily    Type 2 diabetes mellitus  "with diabetic polyneuropathy, without long-term current use of insulin (H)  Improvement since last check. Refilled.   - glyBURIDE (DIABETA /MICRONASE) 5 MG tablet; Take 2 tablets (10 mg) by mouth daily (with breakfast)  - metFORMIN (GLUCOPHAGE) 1000 MG tablet; Take 1 tablet (1,000 mg) by mouth 2 times daily (with meals)  - TRULICITY 1.5 MG/0.5ML pen; Inject 1.5 mg Subcutaneous every 7 days    Hypertension goal BP (blood pressure) < 140/90  Refilled.   - losartan (COZAAR) 100 MG tablet; Take 1 tablet (100 mg) by mouth daily    Other male erectile dysfunction  Doesn't need refill of cialis.       BMI:   Estimated body mass index is 36.89 kg/m  as calculated from the following:    Height as of 3/18/20: 1.759 m (5' 9.25\").    Weight as of 3/18/20: 114.1 kg (251 lb 9.6 oz).   Weight management plan: Discussed healthy diet and exercise guidelines         See Patient Instructions    Return in about 6 months (around 6/28/2021).    RABIA Campos CNP  [facility]      Video-Visit Details    Type of service:  Video Visit    Video End Time:4:15 pm    Originating Location (pt. Location): Home    Distant Location (provider location):  [facility]     Platform used for Video Visit: Indira          "

## 2020-12-28 NOTE — TELEPHONE ENCOUNTER
Patient is calling regarding medication refill.  He had labs last week and missed his virtual visit as he was in the area with poor phone connection.  Video visit scheduled later today with Kristi Myers for diabetes follow up and medication refill.  PCP is out this week.    Marisela Razo RN

## 2020-12-29 ASSESSMENT — ANXIETY QUESTIONNAIRES: GAD7 TOTAL SCORE: 0

## 2021-03-02 ENCOUNTER — OFFICE VISIT (OUTPATIENT)
Dept: FAMILY MEDICINE | Facility: CLINIC | Age: 61
End: 2021-03-02
Payer: COMMERCIAL

## 2021-03-02 VITALS
HEART RATE: 83 BPM | WEIGHT: 258 LBS | TEMPERATURE: 97.8 F | BODY MASS INDEX: 38.21 KG/M2 | HEIGHT: 69 IN | OXYGEN SATURATION: 96 % | SYSTOLIC BLOOD PRESSURE: 125 MMHG | RESPIRATION RATE: 16 BRPM | DIASTOLIC BLOOD PRESSURE: 78 MMHG

## 2021-03-02 DIAGNOSIS — L98.9 SKIN LESION: Primary | ICD-10-CM

## 2021-03-02 DIAGNOSIS — Z23 NEED FOR VACCINATION: ICD-10-CM

## 2021-03-02 DIAGNOSIS — E11.9 TYPE 2 DIABETES MELLITUS WITHOUT COMPLICATION, WITHOUT LONG-TERM CURRENT USE OF INSULIN (H): ICD-10-CM

## 2021-03-02 PROCEDURE — 90472 IMMUNIZATION ADMIN EACH ADD: CPT | Performed by: PHYSICIAN ASSISTANT

## 2021-03-02 PROCEDURE — 90750 HZV VACC RECOMBINANT IM: CPT | Performed by: PHYSICIAN ASSISTANT

## 2021-03-02 PROCEDURE — 90471 IMMUNIZATION ADMIN: CPT | Performed by: PHYSICIAN ASSISTANT

## 2021-03-02 PROCEDURE — 90732 PPSV23 VACC 2 YRS+ SUBQ/IM: CPT | Performed by: PHYSICIAN ASSISTANT

## 2021-03-02 PROCEDURE — 99213 OFFICE O/P EST LOW 20 MIN: CPT | Mod: 25 | Performed by: PHYSICIAN ASSISTANT

## 2021-03-02 ASSESSMENT — MIFFLIN-ST. JEOR: SCORE: 1969.62

## 2021-03-02 ASSESSMENT — PAIN SCALES - GENERAL: PAINLEVEL: NO PAIN (0)

## 2021-03-02 NOTE — PATIENT INSTRUCTIONS
At [facility], we strive to deliver an exceptional experience to you, every time we see you. If you receive a survey, please complete it as we do value your feedback.  If you have MyChart, you can expect to receive results automatically within 24 hours of their completion.  Your provider will send a note interpreting your results as well.   If you do not have MyChart, you should receive your results in about a week by mail.    Your care team:                            Family Medicine Internal Medicine   MD Jose Esparza MD Shantel Branch-Fleming, MD Srinivasa Vaka, MD Katya Belousova, PATAMMY Myers, APRN CNP    Flynn Ndiaye, MD Pediatrics   Mikey Shipley, PATAMMY Blake, CNP MD Layla Barros APRN CNP   MD Marjorie Aguilar MD Deborah Mielke, MD Vandana Vazquez, APRN [facility]      Clinic hours: Monday - Thursday 7 am-6 pm; Fridays 7 am-5 pm.   Urgent care: Monday - Friday 11 am-9 pm; Saturday and Sunday 9 am-5 pm.    Clinic: [phone]       [facility]: Monday - Thursday 8 am - 7 pm; Friday 8 am - 6 pm  [facility]: [phone]     Use [email or web address] for 24/7 diagnosis and treatment of dozens of conditions.

## 2021-03-02 NOTE — NURSING NOTE
Prior to immunization administration, verified patients identity using patient s name and date of birth. Please see Immunization Activity for additional information.     Screening Questionnaire for Adult Immunization    Are you sick today?   No   Do you have allergies to medications, food, a vaccine component or latex?   No   Have you ever had a serious reaction after receiving a vaccination?   No   Do you have a long-term health problem with heart, lung, kidney, or metabolic disease (e.g., diabetes), asthma, a blood disorder, no spleen, complement component deficiency, a cochlear implant, or a spinal fluid leak?  Are you on long-term aspirin therapy?   Yes   Do you have cancer, leukemia, HIV/AIDS, or any other immune system problem?   No   Do you have a parent, brother, or sister with an immune system problem?   No   In the past 3 months, have you taken medications that affect  your immune system, such as prednisone, other steroids, or anticancer drugs; drugs for the treatment of rheumatoid arthritis, Crohn s disease, or psoriasis; or have you had radiation treatments?   No   Have you had a seizure, or a brain or other nervous system problem?   No   During the past year, have you received a transfusion of blood or blood    products, or been given immune (gamma) globulin or antiviral drug?   No   For women: Are you pregnant or is there a chance you could become       pregnant during the next month?   No   Have you received any vaccinations in the past 4 weeks?   No     Immunization questionnaire was positive for at least one answer.  Notified PROVIDER AWARE.        Patient instructed to remain in clinic for 15 minutes afterwards, and to report any adverse reaction to me immediately.       Screening performed by Karma Gloria MA on 3/2/2021 at 4:25 PM.

## 2021-03-02 NOTE — PROGRESS NOTES
"    Assessment & Plan well appearing, lesion most looks like very superficial bruise. Does not appear like anything alarming, patient will continue to monitor and inform us of changes or persistence.    Problem List Items Addressed This Visit     Type 2 diabetes mellitus without complication, without long-term current use of insulin (H)      Other Visit Diagnoses     Skin lesion    -  Primary    Need for vaccination        Relevant Orders    SHINGRIX [[id number]] (Completed)    Pneumococcal vaccine 23 valent PPSV23  (Pneumovax) [[id number]] (Completed)            13 minutes spent on the date of the encounter doing chart review, history and exam, documentation and further activities as noted above     Return in about 2 weeks (around 3/16/2021), or if symptoms worsen or fail to improve.    ANNE Rogel  [facility] XOCHITLIRIS White is a 61 year old who presents for the following health issues       HPI   Patient hx DM- lasy check controlled.    Concern - Blister  Onset: noticed one week ago  Description: red blister on right foot.  Intensity: no pain, no injury  Progression of Symptoms:  same  Accompanying Signs & Symptoms: None.  Previous history of similar problem: None.  Precipitating factors:        Worsened by: None  Alleviating factors:        Improved by: None.  Therapies tried and outcome:  none      Review of Systems   SKIN rt foot as above      Objective    /78 (BP Location: Left arm, Patient Position: Sitting, Cuff Size: Adult Large)   Pulse 83   Temp 97.8  F (36.6  C) (Tympanic)   Resp 16   Ht 1.759 m (5' 9.25\")   Wt 117 kg (258 lb)   SpO2 96%   BMI 37.83 kg/m    Body mass index is 37.83 kg/m .  Physical Exam   GENERAL: healthy, alert and no distress  SKINrt foot, plantar distal mid foot with oval 1.5x 2 cm macular well demarcated faintly red lesion, nonttp, toes with good color, no lesions,             "

## 2021-06-22 DIAGNOSIS — E11.42 TYPE 2 DIABETES MELLITUS WITH DIABETIC POLYNEUROPATHY, WITHOUT LONG-TERM CURRENT USE OF INSULIN (H): ICD-10-CM

## 2021-06-23 RX ORDER — DULAGLUTIDE 1.5 MG/.5ML
1.5 INJECTION, SOLUTION SUBCUTANEOUS
Qty: 6 ML | Refills: 1 | Status: SHIPPED | OUTPATIENT
Start: 2021-06-23 | End: 2021-09-21

## 2021-06-23 RX ORDER — GLYBURIDE 5 MG/1
10 TABLET ORAL
Qty: 180 TABLET | Refills: 1 | Status: SHIPPED | OUTPATIENT
Start: 2021-06-23 | End: 2021-09-21

## 2021-06-23 NOTE — TELEPHONE ENCOUNTER
Routing refill request to provider for review/approval because:  Labs not current:  A1C    Shaylee PINZONN, RN

## 2021-09-21 DIAGNOSIS — I10 HYPERTENSION GOAL BP (BLOOD PRESSURE) < 140/90: ICD-10-CM

## 2021-09-21 DIAGNOSIS — E11.42 TYPE 2 DIABETES MELLITUS WITH DIABETIC POLYNEUROPATHY, WITHOUT LONG-TERM CURRENT USE OF INSULIN (H): ICD-10-CM

## 2021-09-21 DIAGNOSIS — E78.5 HYPERLIPIDEMIA LDL GOAL <100: ICD-10-CM

## 2021-09-21 DIAGNOSIS — F32.4 MAJOR DEPRESSIVE DISORDER WITH SINGLE EPISODE, IN PARTIAL REMISSION (H): ICD-10-CM

## 2021-09-21 RX ORDER — DULAGLUTIDE 1.5 MG/.5ML
1.5 INJECTION, SOLUTION SUBCUTANEOUS
Qty: 6 ML | Refills: 0 | Status: SHIPPED | OUTPATIENT
Start: 2021-09-21 | End: 2022-02-10

## 2021-09-21 RX ORDER — GLYBURIDE 5 MG/1
10 TABLET ORAL
Qty: 180 TABLET | Refills: 0 | Status: SHIPPED | OUTPATIENT
Start: 2021-09-21 | End: 2021-09-30

## 2021-09-21 RX ORDER — BUPROPION HYDROCHLORIDE 300 MG/1
300 TABLET ORAL EVERY MORNING
Qty: 90 TABLET | Refills: 0 | Status: SHIPPED | OUTPATIENT
Start: 2021-09-21 | End: 2021-09-30

## 2021-09-21 RX ORDER — LOSARTAN POTASSIUM 100 MG/1
100 TABLET ORAL DAILY
Qty: 90 TABLET | Refills: 0 | Status: SHIPPED | OUTPATIENT
Start: 2021-09-21 | End: 2021-09-30

## 2021-09-21 RX ORDER — ATORVASTATIN CALCIUM 40 MG/1
40 TABLET, FILM COATED ORAL EVERY MORNING
Qty: 90 TABLET | Refills: 0 | Status: SHIPPED | OUTPATIENT
Start: 2021-09-21 | End: 2021-09-30

## 2021-09-21 RX ORDER — DESVENLAFAXINE 100 MG/1
100 TABLET, EXTENDED RELEASE ORAL DAILY
Qty: 90 TABLET | Refills: 0 | Status: SHIPPED | OUTPATIENT
Start: 2021-09-21 | End: 2021-09-30

## 2021-09-21 NOTE — TELEPHONE ENCOUNTER
Please inform patient s/he is due for labs and provider appt. Eve refill provided.    Mikey Shipley PA-C

## 2021-09-21 NOTE — TELEPHONE ENCOUNTER
.Reason for call:  Medication   If this is a refill request, has the caller requested the refill from the pharmacy already? No  Will the patient be using a [facility]? No  Name of the pharmacy and phone number for the current request[facility]    Name of the medication requested: all    Other request: fill scripts    Phone number to reach patient:  Home number on file [phone] (home)    Best Time:  anytime    Can we leave a detailed message on this number?  YES    Travel screening: Negative

## 2021-09-21 NOTE — TELEPHONE ENCOUNTER
Routing refill request to provider for review/approval because:  Labs not current:    Lab Results   Component Value Date    A1C 7.6 12/17/2020    A1C 8.8 03/18/2020       Patient needs to be seen because:  Diabetes, depression  PHQ 3/18/2020 12/28/2020   PHQ-9 Total Score 2 1   Q9: Thoughts of better off dead/self-harm past 2 weeks Not at all Not at all     Nannette Rdz RN

## 2021-09-30 ENCOUNTER — OFFICE VISIT (OUTPATIENT)
Dept: FAMILY MEDICINE | Facility: CLINIC | Age: 61
End: 2021-09-30
Payer: COMMERCIAL

## 2021-09-30 ENCOUNTER — APPOINTMENT (OUTPATIENT)
Dept: LAB | Facility: CLINIC | Age: 61
End: 2021-09-30
Payer: COMMERCIAL

## 2021-09-30 VITALS
SYSTOLIC BLOOD PRESSURE: 142 MMHG | DIASTOLIC BLOOD PRESSURE: 89 MMHG | BODY MASS INDEX: 36.42 KG/M2 | OXYGEN SATURATION: 97 % | HEIGHT: 70 IN | WEIGHT: 254.4 LBS | HEART RATE: 78 BPM | TEMPERATURE: 97.4 F

## 2021-09-30 DIAGNOSIS — N52.8 OTHER MALE ERECTILE DYSFUNCTION: ICD-10-CM

## 2021-09-30 DIAGNOSIS — Z12.11 SCREEN FOR COLON CANCER: ICD-10-CM

## 2021-09-30 DIAGNOSIS — E11.42 TYPE 2 DIABETES MELLITUS WITH DIABETIC POLYNEUROPATHY, WITHOUT LONG-TERM CURRENT USE OF INSULIN (H): Primary | ICD-10-CM

## 2021-09-30 DIAGNOSIS — I10 HYPERTENSION GOAL BP (BLOOD PRESSURE) < 140/90: ICD-10-CM

## 2021-09-30 DIAGNOSIS — L98.9 LESION OF SKIN OF SCALP: ICD-10-CM

## 2021-09-30 DIAGNOSIS — F32.4 MAJOR DEPRESSIVE DISORDER WITH SINGLE EPISODE, IN PARTIAL REMISSION (H): ICD-10-CM

## 2021-09-30 DIAGNOSIS — E78.5 HYPERLIPIDEMIA LDL GOAL <100: ICD-10-CM

## 2021-09-30 LAB
ALBUMIN SERPL-MCNC: 3.4 G/DL (ref 3.4–5)
ALP SERPL-CCNC: 108 U/L (ref 40–150)
ALT SERPL W P-5'-P-CCNC: 36 U/L (ref 0–70)
ANION GAP SERPL CALCULATED.3IONS-SCNC: 5 MMOL/L (ref 3–14)
AST SERPL W P-5'-P-CCNC: 34 U/L (ref 0–45)
BILIRUB SERPL-MCNC: 0.6 MG/DL (ref 0.2–1.3)
BUN SERPL-MCNC: 17 MG/DL (ref 7–30)
CALCIUM SERPL-MCNC: 8.8 MG/DL (ref 8.5–10.1)
CHLORIDE BLD-SCNC: 102 MMOL/L (ref 94–109)
CHOLEST SERPL-MCNC: 213 MG/DL
CO2 SERPL-SCNC: 27 MMOL/L (ref 20–32)
CREAT SERPL-MCNC: 1.03 MG/DL (ref 0.66–1.25)
CREAT UR-MCNC: 110 MG/DL
FASTING STATUS PATIENT QL REPORTED: YES
GFR SERPL CREATININE-BSD FRML MDRD: 78 ML/MIN/1.73M2
GLUCOSE BLD-MCNC: 159 MG/DL (ref 70–99)
HBA1C MFR BLD: 8.5 % (ref 0–5.6)
HDLC SERPL-MCNC: 42 MG/DL
LDLC SERPL CALC-MCNC: 115 MG/DL
MICROALBUMIN UR-MCNC: 7 MG/L
MICROALBUMIN/CREAT UR: 6.36 MG/G CR (ref 0–17)
NONHDLC SERPL-MCNC: 171 MG/DL
POTASSIUM BLD-SCNC: 4.1 MMOL/L (ref 3.4–5.3)
PROT SERPL-MCNC: 7.4 G/DL (ref 6.8–8.8)
SODIUM SERPL-SCNC: 134 MMOL/L (ref 133–144)
TRIGL SERPL-MCNC: 279 MG/DL

## 2021-09-30 PROCEDURE — 99214 OFFICE O/P EST MOD 30 MIN: CPT | Performed by: NURSE PRACTITIONER

## 2021-09-30 PROCEDURE — 82043 UR ALBUMIN QUANTITATIVE: CPT | Performed by: NURSE PRACTITIONER

## 2021-09-30 PROCEDURE — 83036 HEMOGLOBIN GLYCOSYLATED A1C: CPT | Performed by: NURSE PRACTITIONER

## 2021-09-30 PROCEDURE — 36415 COLL VENOUS BLD VENIPUNCTURE: CPT | Performed by: NURSE PRACTITIONER

## 2021-09-30 PROCEDURE — 80061 LIPID PANEL: CPT | Performed by: NURSE PRACTITIONER

## 2021-09-30 PROCEDURE — 80053 COMPREHEN METABOLIC PANEL: CPT | Performed by: NURSE PRACTITIONER

## 2021-09-30 RX ORDER — GLYBURIDE 5 MG/1
10 TABLET ORAL
Qty: 180 TABLET | Refills: 1 | Status: SHIPPED | OUTPATIENT
Start: 2021-09-30 | End: 2022-06-15

## 2021-09-30 RX ORDER — TADALAFIL 10 MG/1
10 TABLET ORAL DAILY PRN
Qty: 10 TABLET | Refills: 3 | Status: SHIPPED | OUTPATIENT
Start: 2021-09-30 | End: 2022-08-29

## 2021-09-30 RX ORDER — ATORVASTATIN CALCIUM 40 MG/1
40 TABLET, FILM COATED ORAL EVERY MORNING
Qty: 90 TABLET | Refills: 1 | Status: SHIPPED | OUTPATIENT
Start: 2021-09-30 | End: 2022-06-15

## 2021-09-30 RX ORDER — BUPROPION HYDROCHLORIDE 300 MG/1
300 TABLET ORAL EVERY MORNING
Qty: 90 TABLET | Refills: 1 | Status: SHIPPED | OUTPATIENT
Start: 2021-09-30 | End: 2022-06-15

## 2021-09-30 RX ORDER — LOSARTAN POTASSIUM 100 MG/1
100 TABLET ORAL DAILY
Qty: 90 TABLET | Refills: 1 | Status: SHIPPED | OUTPATIENT
Start: 2021-09-30 | End: 2022-06-15

## 2021-09-30 RX ORDER — DESVENLAFAXINE 100 MG/1
100 TABLET, EXTENDED RELEASE ORAL DAILY
Qty: 90 TABLET | Refills: 1 | Status: SHIPPED | OUTPATIENT
Start: 2021-09-30 | End: 2022-06-15

## 2021-09-30 ASSESSMENT — PATIENT HEALTH QUESTIONNAIRE - PHQ9
SUM OF ALL RESPONSES TO PHQ QUESTIONS 1-9: 2
SUM OF ALL RESPONSES TO PHQ QUESTIONS 1-9: 2
10. IF YOU CHECKED OFF ANY PROBLEMS, HOW DIFFICULT HAVE THESE PROBLEMS MADE IT FOR YOU TO DO YOUR WORK, TAKE CARE OF THINGS AT HOME, OR GET ALONG WITH OTHER PEOPLE: SOMEWHAT DIFFICULT

## 2021-09-30 ASSESSMENT — MIFFLIN-ST. JEOR: SCORE: 1957.26

## 2021-09-30 ASSESSMENT — PAIN SCALES - GENERAL: PAINLEVEL: NO PAIN (1)

## 2021-09-30 NOTE — LETTER
October 1, 2021      Christopher Rodriguez  [address] [place] [address]        Mr. Rodriguez,   Your lab results show normal kidneys, liver and electrolytes. Your a1c is 8.5, which is a bit worse than last check 9 months ago when it was 7.5. Your cholesterol is elevated.  I recommend watching diet: eat lots of fruits/vegetables and lean meats. Avoid processed foods. Get at least 30 minutes of exercise 4-5 days per week. Please let us know if you have any questions. Please follow up in 3 months for recheck. If labs are similar with work on diet/exercise changes, we should make some medication changes.   Thank you for allowing us to participate in your care.   RABIA Campos CNP       Resulted Orders   Comprehensive metabolic panel   Result Value Ref Range    Sodium 134 133 - 144 mmol/L    Potassium 4.1 3.4 - 5.3 mmol/L    Chloride 102 94 - 109 mmol/L    Carbon Dioxide (CO2) 27 20 - 32 mmol/L    Anion Gap 5 3 - 14 mmol/L    Urea Nitrogen 17 7 - 30 mg/dL    Creatinine 1.03 0.66 - 1.25 mg/dL    Calcium 8.8 8.5 - 10.1 mg/dL    Glucose 159 (H) 70 - 99 mg/dL    Alkaline Phosphatase 108 40 - 150 U/L    AST 34 0 - 45 U/L    ALT 36 0 - 70 U/L    Protein Total 7.4 6.8 - 8.8 g/dL    Albumin 3.4 3.4 - 5.0 g/dL    Bilirubin Total 0.6 0.2 - 1.3 mg/dL    GFR Estimate 78 >60 mL/min/1.73m2      Comment:      As of July 11, 2021, eGFR is calculated by the CKD-EPI creatinine equation, without race adjustment. eGFR can be influenced by muscle mass, exercise, and diet. The reported eGFR is an estimation only and is only applicable if the renal function is stable.   Hemoglobin A1c   Result Value Ref Range    Hemoglobin A1C 8.5 (H) 0.0 - 5.6 %      Comment:      Normal <5.7%   Prediabetes 5.7-6.4%    Diabetes 6.5% or higher     Note: Adopted from ADA consensus guidelines.   Albumin Random Urine Quantitative with Creat Ratio   Result Value Ref Range    Creatinine Urine mg/dL 110 mg/dL    Albumin Urine mg/L 7 mg/L    Albumin Urine  mg/g Cr 6.36 0.00 - 17.00 mg/g Cr   Lipid Profile   Result Value Ref Range    Cholesterol 213 (H) <200 mg/dL    Triglycerides 279 (H) <150 mg/dL    Direct Measure HDL 42 >=40 mg/dL    LDL Cholesterol Calculated 115 (H) <=100 mg/dL    Non HDL Cholesterol 171 (H) <130 mg/dL    Patient Fasting > 8hrs? Yes     Narrative    Cholesterol  Desirable:  <200 mg/dL    Triglycerides  Normal:  Less than 150 mg/dL  Borderline High:  150-199 mg/dL  High:  200-499 mg/dL  Very High:  Greater than or equal to 500 mg/dL    Direct Measure HDL  Female:  Greater than or equal to 50 mg/dL   Male:  Greater than or equal to 40 mg/dL    LDL Cholesterol  Desirable:  <100mg/dL  Above Desirable:  100-129 mg/dL   Borderline High:  130-159 mg/dL   High:  160-189 mg/dL   Very High:  >= 190 mg/dL    Non HDL Cholesterol  Desirable:  130 mg/dL  Above Desirable:  130-159 mg/dL  Borderline High:  160-189 mg/dL  High:  190-219 mg/dL  Very High:  Greater than or equal to 220 mg/dL

## 2021-09-30 NOTE — PROGRESS NOTES
"    Assessment & Plan     Type 2 diabetes mellitus with diabetic polyneuropathy, without long-term current use of insulin (H)  Labs pending. Refilled. Further plan pending results. Overdue for diabetic eye exam.  - OPTOMETRY REFERRAL; Future  - Comprehensive metabolic panel  - Hemoglobin A1c  - Albumin Random Urine Quantitative with Creat Ratio  - Lipid Profile  - glyBURIDE (DIABETA /MICRONASE) 5 MG tablet; Take 2 tablets (10 mg) by mouth daily (with breakfast)  - metFORMIN (GLUCOPHAGE) 1000 MG tablet; Take 1 tablet (1,000 mg) by mouth 2 times daily (with meals)    Hyperlipidemia LDL goal <100  Labs pending. Refilled.  - atorvastatin (LIPITOR) 40 MG tablet; Take 1 tablet (40 mg) by mouth every morning    Major depressive disorder with single episode, in partial remission (H)  Doing well. No red flags. Refilled.  - buPROPion (WELLBUTRIN XL) 300 MG 24 hr tablet; Take 1 tablet (300 mg) by mouth every morning  - desvenlafaxine (PRISTIQ) 100 MG 24 hr tablet; Take 1 tablet (100 mg) by mouth daily    Hypertension goal BP (blood pressure) < 140/90  Didn't take meds yet today. Typically controlled by his report. Refilled. Labs today.  - losartan (COZAAR) 100 MG tablet; Take 1 tablet (100 mg) by mouth daily    Other male erectile dysfunction  Refilled.   - tadalafil (CIALIS) 10 MG tablet; Take 1 tablet (10 mg) by mouth daily as needed (erectily dysfunction. Take 30-60 min before sexual activity.)    Lesion of skin of scalp  Actinic keratosis? Recommend derm referral for eval and skin exam.  - Adult Dermatology Referral; Future    Screen for colon cancer  Had colonoscopy in [place], [place]. ANDERSON sent. Patient thinks he's due in the next 1-2 years.               BMI:   Estimated body mass index is 37.03 kg/m  as calculated from the following:    Height as of this encounter: 1.765 m (5' 9.5\").    Weight as of this encounter: 115.4 kg (254 lb 6.4 oz).   Weight management plan: Discussed healthy diet and exercise " guidelines    See Patient Instructions    No follow-ups on file.     The benefits, risks and potential side effects were discussed in detail. Black box warnings discussed as relevant. All patient questions were answered. The patient was instructed to follow up immediately if any adverse reactions develop.    Return precautions discussed, including when to seek urgent/emergent care.    Patient verbalizes understanding and agrees with plan of care. Patient stable for discharge.      RABIA Campos CNP  [facility] is a 61 year old who presents for the following health issues     HPI     Diabetes Follow-up    How often are you checking your blood sugar? A few times a week  What time of day are you checking your blood sugars (select all that apply)?  Before meals  Have you had any blood sugars above 200?  No  Have you had any blood sugars below 70?  No    What symptoms do you notice when your blood sugar is low?  None    What concerns do you have today about your diabetes? None     Do you have any of these symptoms? (Select all that apply)  No numbness or tingling in feet.  No redness, sores or blisters on feet.  No complaints of excessive thirst.  No reports of blurry vision.  No significant changes to weight.    Have you had a diabetic eye exam in the last 12 months? No          Hyperlipidemia Follow-Up      Are you regularly taking any medication or supplement to lower your cholesterol?   Yes- atorvastatin    Are you having muscle aches or other side effects that you think could be caused by your cholesterol lowering medication?  No    Hypertension Follow-up      Do you check your blood pressure regularly outside of the clinic? No     Are you following a low salt diet? No    Are your blood pressures ever more than 140 on the top number (systolic) OR more   than 90 on the bottom number (diastolic), for example 140/90? No    BP Readings from Last 2 Encounters:    09/30/21 (!) 142/89   03/02/21 125/78     Hemoglobin A1C (%)   Date Value   09/30/2021 8.5 (H)   12/17/2020 7.6 (H)   03/18/2020 8.8 (H)     LDL Cholesterol Calculated (mg/dL)   Date Value   12/17/2020 130 (H)   03/18/2020 110 (H)       Depression Followup    How are you doing with your depression since your last visit? No change    Are you having other symptoms that might be associated with depression? No    Have you had a significant life event?  No     Are you feeling anxious or having panic attacks?   No    Do you have any concerns with your use of alcohol or other drugs? No    Social History     Tobacco Use     Smoking status: Never Smoker     Smokeless tobacco: Never Used   Substance Use Topics     Alcohol use: Yes     Comment: sometimes      Drug use: Never     PHQ 3/18/2020 12/28/2020 9/30/2021   PHQ-9 Total Score 2 1 2   Q9: Thoughts of better off dead/self-harm past 2 weeks Not at all Not at all Not at all     AMBROCIO-7 SCORE 3/18/2020 12/28/2020   Total Score 3 0     Last PHQ-9 9/30/2021   1.  Little interest or pleasure in doing things 0   2.  Feeling down, depressed, or hopeless 0   3.  Trouble falling or staying asleep, or sleeping too much 1   4.  Feeling tired or having little energy 1   5.  Poor appetite or overeating 0   6.  Feeling bad about yourself 0   7.  Trouble concentrating 0   8.  Moving slowly or restless 0   Q9: Thoughts of better off dead/self-harm past 2 weeks 0   PHQ-9 Total Score 2   Difficulty at work, home, or with people -     AMBROCIO-7  12/28/2020   1. Feeling nervous, anxious, or on edge 0   2. Not being able to stop or control worrying 0   3. Worrying too much about different things 0   4. Trouble relaxing 0   5. Being so restless that it is hard to sit still 0   6. Becoming easily annoyed or irritable 0   7. Feeling afraid, as if something awful might happen 0   AMBROCIO-7 Total Score 0   If you checked any problems, how difficult have they made it for you to do your work, take care of  "things at home, or get along with other people? Somewhat difficult     No thoughts to harm self/others  Feels safe  Depression currently well controlled    Suicide Assessment Five-step Evaluation and Treatment (SAFE-T)        Review of Systems   Constitutional, HEENT, cardiovascular, pulmonary, gi and gu systems are negative, except as otherwise noted.      Objective    BP (!) 142/89 (BP Location: Left arm, Patient Position: Sitting, Cuff Size: Adult Large)   Pulse 78   Temp 97.4  F (36.3  C) (Tympanic)   Ht 1.765 m (5' 9.5\")   Wt 115.4 kg (254 lb 6.4 oz)   SpO2 97%   BMI 37.03 kg/m    Body mass index is 37.03 kg/m .  Physical Exam   GENERAL: healthy, alert and no distress  NECK: no adenopathy, no asymmetry, masses, or scars and thyroid normal to palpation  RESP: lungs clear to auscultation - no rales, rhonchi or wheezes  CV: regular rate and rhythm, normal S1 S2, no S3 or S4, no murmur, click or rub, no peripheral edema and peripheral pulses strong  ABDOMEN: soft, nontender, no hepatosplenomegaly, no masses and bowel sounds normal  MS: no gross musculoskeletal defects noted, no edema  SKIN: scaly patch lesions to scalp  PSYCH: mentation appears normal, affect normal/bright    Results for orders placed or performed in visit on 09/30/21 (from the past 24 hour(s))   Hemoglobin A1c   Result Value Ref Range    Hemoglobin A1C 8.5 (H) 0.0 - 5.6 %               Answers for HPI/ROS submitted by the patient on 9/30/2021  If you checked off any problems, how difficult have these problems made it for you to do your work, take care of things at home, or get along with other people?: Somewhat difficult  PHQ9 TOTAL SCORE: 2      "

## 2021-09-30 NOTE — PATIENT INSTRUCTIONS
At [facility], we strive to deliver an exceptional experience to you, every time we see you. If you receive a survey, please complete it as we do value your feedback.  If you have MyChart, you can expect to receive results automatically within 24 hours of their completion.  Your provider will send a note interpreting your results as well.   If you do not have MyChart, you should receive your results in about a week by mail.    Your care team:                            Family Medicine Internal Medicine   MD Jose Esparza MD Shantel Branch-Fleming, MD Srinivasa Vaka, MD Katya Belousova, PATAMMY Myers, APRN CNP    Flynn Ndiaye, MD Pediatrics   Mikey Shipley, PATAMMY Blake, CNP MD Layla Barros APRN CNP   MD Marjorie Aguilar MD Deborah Mielke, MD Vandana Vazquez, APRN [facility]      Clinic hours: Monday - Thursday 7 am-6 pm; Fridays 7 am-5 pm.   Urgent care: Monday - Friday 10 am- 8 pm; Saturday and Sunday 9 am-5 pm.    Clinic: [phone]       [facility]: Monday - Thursday 8 am - 7 pm; Friday 8 am - 6 pm  [facility]: [phone]     Use [email or web address] for 24/7 diagnosis and treatment of dozens of conditions.

## 2021-10-01 NOTE — RESULT ENCOUNTER NOTE
Please send letter:    Mr. Rodriguez,  Your lab results show normal kidneys, liver and electrolytes. Your a1c is 8.5, which is a bit worse than last check 9 months ago when it was 7.5. Your cholesterol is elevated.  I recommend watching diet: eat lots of fruits/vegetables and lean meats. Avoid processed foods. Get at least 30 minutes of exercise 4-5 days per week. Please let us know if you have any questions. Please follow up in 3 months for recheck. If labs are similar with work on diet/exercise changes, we should make some medication changes.  Thank you for allowing us to participate in your care.  RABIA Campos CNP

## 2021-10-11 ENCOUNTER — OFFICE VISIT (OUTPATIENT)
Dept: DERMATOLOGY | Facility: CLINIC | Age: 61
End: 2021-10-11
Attending: NURSE PRACTITIONER
Payer: COMMERCIAL

## 2021-10-11 DIAGNOSIS — D23.9 DERMATOFIBROMA: ICD-10-CM

## 2021-10-11 DIAGNOSIS — L57.8 ACTINIC SKIN DAMAGE: ICD-10-CM

## 2021-10-11 DIAGNOSIS — L81.4 SOLAR LENTIGO: ICD-10-CM

## 2021-10-11 DIAGNOSIS — L82.1 SEBORRHEIC KERATOSIS: ICD-10-CM

## 2021-10-11 DIAGNOSIS — L57.8 SUN-DAMAGED SKIN: ICD-10-CM

## 2021-10-11 DIAGNOSIS — D22.9 MULTIPLE BENIGN NEVI: ICD-10-CM

## 2021-10-11 DIAGNOSIS — D18.01 CHERRY ANGIOMA: ICD-10-CM

## 2021-10-11 DIAGNOSIS — L57.0 ACTINIC KERATOSIS: Primary | ICD-10-CM

## 2021-10-11 PROCEDURE — 99203 OFFICE O/P NEW LOW 30 MIN: CPT | Mod: 25 | Performed by: DERMATOLOGY

## 2021-10-11 PROCEDURE — 17003 DESTRUCT PREMALG LES 2-14: CPT | Performed by: DERMATOLOGY

## 2021-10-11 PROCEDURE — 17000 DESTRUCT PREMALG LESION: CPT | Performed by: DERMATOLOGY

## 2021-10-11 RX ORDER — FLUOROURACIL 50 MG/G
CREAM TOPICAL
Qty: 49 G | Refills: 0 | Status: SHIPPED | OUTPATIENT
Start: 2021-10-11

## 2021-10-11 RX ORDER — CALCIPOTRIENE 50 UG/G
CREAM TOPICAL
Qty: 60 G | Refills: 0 | Status: SHIPPED | OUTPATIENT
Start: 2021-10-11

## 2021-10-11 ASSESSMENT — PAIN SCALES - GENERAL: PAINLEVEL: NO PAIN (0)

## 2021-10-11 NOTE — NURSING NOTE
Christopher Rodriguez's goals for this visit include:   Chief Complaint   Patient presents with     Skin Check     pt concerns brown spots under bilateral eyes and scaley scalp. Has not been to derm, No personal HX of SC, Dad unknown SC       He requests these members of his care team be copied on today's visit information:     PCP: Su Robertson    Referring Provider:  RABIA Lozoya CNP  [address] [place] [address]    There were no vitals taken for this visit.    Do you need any medication refills at today's visit? no

## 2021-10-11 NOTE — PROGRESS NOTES
[facility] Dermatology Note  Encounter Date: Oct 11, 2021  Office Visit     Dermatology Problem List:  1. AKs. Liq N2.  - efudex/calcipotriene initiated 10/11/21  ____________________________________________    Assessment & Plan:    # Benign lesions: Multiple benign nevi, solar lentigos, seborrheic keratoses, cherry angiomas, dermatofibromas. Explained to patient benign nature of lesion. No treatment is necessary at this time unless the lesion changes or becomes symptomatic.   - ABCDs of melanoma were discussed and self skin checks were advised.  - Sun precaution was advised including the use of sun screens of SPF 30 or higher, sun protective clothing, and avoidance of tanning beds.    # Actinic Keratosis. - left cheek and crown of scalp.  - See cryotherapy procedure note below.    # Diffuse actinic damage - scalp  - Initiate Efudex 5% cream and calcipotriene 0.005% cream applied to the scalp BID for 4-7 days. Discussed that patient should expect redness, blistering and scabbing as well as time course discussed. Informed patient to keep away from children/pets and to wash hands after application.    Procedures Performed:   - Cryotherapy procedure note, locations: left cheek and crown of scalp. After verbal consent and discussion of risks and benefits including, but not limited to, dyspigmentation/scar, blister, and pain, 10 AKs were treated with 1-2 mm freeze border for 1-2 cycles with liquid nitrogen. Post cryotherapy instructions were provided.    Follow-up: 3-4 months for efudex/calcipotriene follow up, 1 year in-person for skin check, or earlier for new or changing lesions.    Staff and Scribe:     Scribe Disclosure:   I, Ivy Wilson, am serving as a scribe to document services personally performed by this physician, Dr. Andrei Alvarez, based on data collection and the provider's statements to me.     Provider Disclosure:   The documentation recorded by the scribe accurately reflects the  "services I personally performed and the decisions made by me.    Andrei Alvarez MD    Department of Dermatology  [facility]: Phone: [phone], Fax:[phone]  [facility]: Phone: [phone] Fax: [phone]  ____________________________________________    CC: Skin Check (pt concerns brown spots under bilateral eyes and scaley scalp. Has not been to derm, No personal HX of SC, Dad unknown SC)    HPI:  Mr. Christopher Rodriguez is a(n) 61 year old male who presents today as a new patient for skin check.    Referred by PCP for scalp lesion on 9/30/21. Note reviewed. States \"scaly patch lesions to scalp\".     Today, patient notes the following concerns:    1. Lesion on the face - dark spots.  2. Scalp - scabby and scaly.    The patient otherwise denies any new or concerning lesions. No bleeding, painful, pruritic, or changing lesions. They report no personal history of skin cancer. There is a family history of skin cancer (unknown type in father). No history of immunosuppression. Minimal history of indoor tanning (twice about 5 years ago). They do not use sunscreen and protective clothing when outdoors for sun protection. Does not burn easily. There is occupational exposure to ultraviolet light or other forms of radiation. Patient works in [employer]. Health otherwise stable. No other skin concerns.     Labs:  N/A    Physical Exam:  Vitals: There were no vitals taken for this visit.  SKIN: Full skin, which includes the head/face, both arms, chest, back, abdomen, both legs, genitalia and/or groin buttocks, digits and/or nails, was examined.  - Brown macules on sun exposed areas  - Brown waxy, stuck-on appearing papules on face, trunk, and extremities.   - Brown macules and papules on trunk and extremities without concerning dermoscopy features  - Red vascular papules on face, trunk " and extremities.   - Pink gritty papules on the left cheek x 1 and crown of scalp x 9.  - There are multiple firm tan/flesh colored papules that dimple with lateral pressure on the bilateral lower legs.  - No other lesions of concern on areas examined.     Medications:  Current Outpatient Medications   Medication     atorvastatin (LIPITOR) 40 MG tablet     buPROPion (WELLBUTRIN XL) 300 MG 24 hr tablet     desvenlafaxine (PRISTIQ) 100 MG 24 hr tablet     glyBURIDE (DIABETA /MICRONASE) 5 MG tablet     losartan (COZAAR) 100 MG tablet     metFORMIN (GLUCOPHAGE) 1000 MG tablet     tadalafil (CIALIS) 10 MG tablet     TRULICITY 1.5 MG/0.5ML pen     No current facility-administered medications for this visit.      Past Medical History:   Patient Active Problem List   Diagnosis     Hypertension goal BP (blood pressure) < 140/90     Morbid obesity (H)     Hyperlipidemia LDL goal <100     Type 2 diabetes mellitus without complication, without long-term current use of insulin (H)     Major depressive disorder with single episode, in partial remission (H)     Behavioral tic     Other male erectile dysfunction     Asymptomatic varicose veins of both lower extremities     Past Medical History:   Diagnosis Date     Depressive disorder      Diabetes (H)      Hyperlipidemia      Hypertension         CC RABIA Lozoya CNP  [address] [place] [address] on close of this encounter.

## 2021-10-11 NOTE — PATIENT INSTRUCTIONS
Cryotherapy    What is it?    Use of a very cold liquid, such as liquid nitrogen, to freeze and destroy abnormal skin cells that need to be removed    What should I expect?    Tenderness and redness    A small blister that might grow and fill with dark purple blood. There may be crusting.    More than one treatment may be needed if the lesions do not go away.    How do I care for the treated area?    Gently wash the area with your hands when bathing.    Use a thin layer of Vaseline to help with healing. You may use a Band-Aid.     The area should heal within 7-10 days and may leave behind a pink or lighter color.     Do not use an antibiotic or Neosporin ointment.     You may take acetaminophen (Tylenol) for pain.     Call your doctor if you have:    Severe pain    Signs of infection (warmth, redness, cloudy yellow drainage, and or a bad smell)    Questions or concerns    Who should I call with questions?       [facility]: [phone]       [facility]: [phone]       For urgent needs outside of business hours call the [facility] at [phone] and ask for the dermatology resident on call        Efudex / Calcipotriene Treatment    Today, you are being prescribed Fluorouracil (Efudex) a topical cream used for the treatment of Actinic Keratosis (AKs).  The medication is working to eliminate the unhealthy cells.  This treatment may be unattractive and somewhat uncomfortable.    Your treatment will last twice daily for days or until the onset of irritation on the scalp.  You may experience some mild discomfort while being treated.    You will want to stop any other creams such as glycolic acid products, retin A, Tazorac, etc. to the area. You may use bland makeup/cover-up as long as it doesn't sting or cause you discomfort.    Apply the cream at night as your physician recommends. Use a cotton-tipped applicator, or use gloves if applying it with  your fingertips. If applied with unprotected fingertips, it is important to wash your hands well after you apply this medicine.     Keep this medication away from pets.    We recommend avoiding excessive sun exposure to the treated area    You may use moisturizing creams over bothersome areas such as Vanicream or Cetaphil cream if the reaction becomes too bothersome. Please, call the clinic if this occurs.   Potential Side Effects    Your treated areas may be unsightly during therapy.  This will improve slowly following the discontinuation of therapy.     During the first week of application, mild inflammation may occur.     During the following weeks, redness, and swelling may occur with some crusting and burning.     Lesions resolve as the skin exfoliates.     Over 1 to 2 weeks, new skin grows into the treatment area.    Keep this medication away from pets  Specific side effects that usually do not require medical attention (report to your doctor or health care professional if they continue or are bothersome) include:    Red or dark-colored skin     Mild erosion (loss of upper layer of skin)     Mild eye irritation including burning, itching, sensitivity, stinging, or watering     Increased sensitivity of the skin to sun and ultraviolet light     Pain and burning of the affected area     Dryness, scaling or swelling of the affected area     Skin rash, itching of the affected area     Tenderness   If you have severe pain, please, call the clinic immediately and indicate that you have pain.  Ask for a call from the RN.     Who should I call with questions?    [facility]: [phone]    [facility]: [phone]    For urgent needs outside of business hours call the [facility] at [phone] and ask for the dermatology resident on call

## 2021-10-12 ENCOUNTER — TELEPHONE (OUTPATIENT)
Dept: DERMATOLOGY | Facility: CLINIC | Age: 61
End: 2021-10-12

## 2021-10-13 NOTE — TELEPHONE ENCOUNTER
Central Prior Authorization Team   Phone: [phone]    PA Initiation    Medication: calcipotriene 0.005% cream  Insurance Company: [employer] - Phone [phone] Fax [phone]  Pharmacy Filling the Rx: [id number] [place], [place] - [address]  Filling Pharmacy Phone: [phone]  Filling Pharmacy Fax: [phone]  Start Date: 10/13/2021

## 2021-10-18 NOTE — TELEPHONE ENCOUNTER
Spoke with patient and informed of Dr. Alvarez's response below. No further questions or concerns.         Can we call patient and let him know the calcipotriene was not covered by insurance.   I would tell him to switch to just using the efudex cream BID for between 2-4 weeks until red, irritated and crusty on the scalp. Thanks!          Jacinta Bedolla on 10/18/2021 at 1:54 PM

## 2021-10-18 NOTE — TELEPHONE ENCOUNTER
Please advise regarding denial for Calcipotriene. Please supply letter of medical necessity if you would like to appeal.    Denial Rational: Insurance states that patient must tried/failed formulary alternative Calcipotriene Solution.            Jacinta Bedolla on 10/18/2021 at 9:36 AM

## 2021-10-18 NOTE — TELEPHONE ENCOUNTER
PRIOR AUTHORIZATION DENIED    Medication: calcipotriene 0.005% cream-PA DENIED     Denial Date: 10/15/2021    Denial Rational: Insurance states that patient must tried/failed formulary alternative Calcipotriene Solution.          Appeal Information:

## 2021-11-19 ENCOUNTER — OFFICE VISIT (OUTPATIENT)
Dept: URGENT CARE | Facility: URGENT CARE | Age: 61
End: 2021-11-19
Payer: COMMERCIAL

## 2021-11-19 VITALS
BODY MASS INDEX: 35.88 KG/M2 | SYSTOLIC BLOOD PRESSURE: 134 MMHG | OXYGEN SATURATION: 96 % | TEMPERATURE: 99 F | RESPIRATION RATE: 20 BRPM | DIASTOLIC BLOOD PRESSURE: 75 MMHG | HEART RATE: 101 BPM | WEIGHT: 246.5 LBS

## 2021-11-19 DIAGNOSIS — T30.4 CHEMICAL BURN: Primary | ICD-10-CM

## 2021-11-19 PROCEDURE — 99207 PR NON-BILLABLE SERV PER CHARTING: CPT | Performed by: PHYSICIAN ASSISTANT

## 2021-11-19 ASSESSMENT — ENCOUNTER SYMPTOMS
SHORTNESS OF BREATH: 0
FREQUENCY: 0
ALLERGIC/IMMUNOLOGIC NEGATIVE: 1
RESPIRATORY NEGATIVE: 1
DIARRHEA: 0
NEUROLOGICAL NEGATIVE: 1
PALPITATIONS: 0
DYSURIA: 0
HEADACHES: 0
SORE THROAT: 0
CONSTITUTIONAL NEGATIVE: 1
MYALGIAS: 0
CARDIOVASCULAR NEGATIVE: 1
ABDOMINAL PAIN: 0
WHEEZING: 0
GASTROINTESTINAL NEGATIVE: 1
FEVER: 0
VOMITING: 0
NAUSEA: 0
CHILLS: 0
ARTHRALGIAS: 1
COUGH: 0
HEMATURIA: 0
CHEST TIGHTNESS: 0
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
## 2021-11-20 NOTE — PROGRESS NOTES
.  Chief Complaint:    No chief complaint on file.        Medical Decision Making:    Vital signs reviewed by Telly Miles PA-C  There were no vitals taken for this visit.    Differential Diagnosis:  {UC Differential Choices:326953}      ASSESSMENT:     No diagnosis found.       PLAN:     ***  Patient instructed to follow up with PCP in 1 week if symptoms are not improving.  Sooner if symptoms worsen.  Worrisome symptoms discussed with instructions to go to the ED.  Patient verbalized understanding and agreed with this plan.    Labs:     No results found for any visits on 11/19/21.    Current Meds:    Current Outpatient Medications:      atorvastatin (LIPITOR) 40 MG tablet, Take 1 tablet (40 mg) by mouth every morning, Disp: 90 tablet, Rfl: 1     buPROPion (WELLBUTRIN XL) 300 MG 24 hr tablet, Take 1 tablet (300 mg) by mouth every morning, Disp: 90 tablet, Rfl: 1     calcipotriene (DOVONOX) 0.005 % external cream, Apply twice daily to the crown of the scalp for 4 days. Can extend up to 7 days until red and irritated., Disp: 60 g, Rfl: 0     desvenlafaxine (PRISTIQ) 100 MG 24 hr tablet, Take 1 tablet (100 mg) by mouth daily, Disp: 90 tablet, Rfl: 1     fluorouracil (EFUDEX) 5 % external cream, Apply twice daily to the crown of the scalp for 4 days. Can extend up to 7 days until red and irritated., Disp: 49 g, Rfl: 0     glyBURIDE (DIABETA /MICRONASE) 5 MG tablet, Take 2 tablets (10 mg) by mouth daily (with breakfast), Disp: 180 tablet, Rfl: 1     losartan (COZAAR) 100 MG tablet, Take 1 tablet (100 mg) by mouth daily, Disp: 90 tablet, Rfl: 1     metFORMIN (GLUCOPHAGE) 1000 MG tablet, Take 1 tablet (1,000 mg) by mouth 2 times daily (with meals), Disp: 180 tablet, Rfl: 1     tadalafil (CIALIS) 10 MG tablet, Take 1 tablet (10 mg) by mouth daily as needed (erectily dysfunction. Take 30-60 min before sexual activity.), Disp: 10 tablet, Rfl: 3     TRULICITY 1.5 MG/0.5ML pen, Inject 1.5 mg Subcutaneous every 7 days, Disp:  6 mL, Rfl: 0    Allergies:  No Known Allergies    SUBJECTIVE    HPI: Christopher Rodriguez is an 61 year old male who presents for evaluation and treatment of ***.    ROS:      Review of Systems   Constitutional: Negative.  Negative for chills and fever.   HENT: Negative.  Negative for sore throat.    Respiratory: Negative.  Negative for cough, chest tightness, shortness of breath and wheezing.    Cardiovascular: Negative.  Negative for chest pain and palpitations.   Gastrointestinal: Negative.  Negative for abdominal pain, diarrhea, nausea and vomiting.   Genitourinary: Negative for dysuria, frequency, hematuria and urgency.   Musculoskeletal: Positive for arthralgias. Negative for myalgias.   Skin: Negative for rash.   Allergic/Immunologic: Negative.  Negative for immunocompromised state.   Neurological: Negative.  Negative for headaches.        Family History   Family History   Family history unknown: Yes       Social History  Social History     Socioeconomic History     Marital status: Single     Spouse name: Not on file     Number of children: Not on file     Years of education: Not on file     Highest education level: Not on file   Occupational History     Occupation: Self Employeed - House flipping   Tobacco Use     Smoking status: Never Smoker     Smokeless tobacco: Never Used   Substance and Sexual Activity     Alcohol use: Yes     Comment: sometimes      Drug use: Never     Sexual activity: Not Currently   Other Topics Concern     Not on file   Social History Narrative     Not on file     Social Determinants of Health     Financial Resource Strain: Not on file   Food Insecurity: Not on file   Transportation Needs: Not on file   Physical Activity: Not on file   Stress: Not on file   Social Connections: Not on file   Intimate Partner Violence: Not on file   Housing Stability: Not on file        Surgical History:  Past Surgical History:   Procedure Laterality Date     ARTHROSCOPY KNEE Left 2012      REPAIR  CLEFT PALATE          Problem List:  Patient Active Problem List   Diagnosis     Hypertension goal BP (blood pressure) < 140/90     Morbid obesity (H)     Hyperlipidemia LDL goal <100     Type 2 diabetes mellitus without complication, without long-term current use of insulin (H)     Major depressive disorder with single episode, in partial remission (H)     Behavioral tic     Other male erectile dysfunction     Asymptomatic varicose veins of both lower extremities           OBJECTIVE:     Vital signs noted and reviewed by Telly Miles PA-C  There were no vitals taken for this visit.     PEFR:    Physical Exam  Vitals and nursing note reviewed.   Constitutional:       General: He is not in acute distress.     Appearance: He is well-developed. He is not ill-appearing, toxic-appearing or diaphoretic.   HENT:      Head: Normocephalic and atraumatic.      Right Ear: Hearing, tympanic membrane, ear canal and external ear normal. Tympanic membrane is not perforated, erythematous, retracted or bulging.      Left Ear: Hearing, tympanic membrane, ear canal and external ear normal. Tympanic membrane is not perforated, erythematous, retracted or bulging.      Nose: Nose normal. No mucosal edema, congestion or rhinorrhea.      Mouth/Throat:      Pharynx: No oropharyngeal exudate or posterior oropharyngeal erythema.      Tonsils: No tonsillar exudate or tonsillar abscesses. 0 on the right. 0 on the left.   Eyes:      Pupils: Pupils are equal, round, and reactive to light.   Cardiovascular:      Rate and Rhythm: Normal rate and regular rhythm.      Heart sounds: Normal heart sounds, S1 normal and S2 normal. Heart sounds not distant. No murmur heard.  No friction rub. No gallop.    Pulmonary:      Effort: Pulmonary effort is normal. No respiratory distress.      Breath sounds: Normal breath sounds. No decreased breath sounds, wheezing, rhonchi or rales.   Abdominal:      General: Bowel sounds are normal. There is no distension.       Palpations: Abdomen is soft.      Tenderness: There is no abdominal tenderness.   Musculoskeletal:      Cervical back: Normal range of motion and neck supple.   Lymphadenopathy:      Cervical: No cervical adenopathy.   Skin:     General: Skin is warm and dry.      Findings: No rash.   Neurological:      Mental Status: He is alert.      Cranial Nerves: No cranial nerve deficit.   Psychiatric:         Attention and Perception: He is attentive.         Speech: Speech normal.         Behavior: Behavior normal. Behavior is cooperative.         Thought Content: Thought content normal.         Judgment: Judgment normal.             Telly Miles PA-C  11/19/2021, 7:13 PM
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
## 2022-01-07 ENCOUNTER — OFFICE VISIT (OUTPATIENT)
Dept: OPTOMETRY | Facility: CLINIC | Age: 62
End: 2022-01-07
Payer: COMMERCIAL

## 2022-01-07 DIAGNOSIS — Z01.01 ENCOUNTER FOR EXAMINATION OF EYES AND VISION WITH ABNORMAL FINDINGS: Primary | ICD-10-CM

## 2022-01-07 DIAGNOSIS — E11.9 TYPE 2 DIABETES MELLITUS WITHOUT RETINOPATHY (H): ICD-10-CM

## 2022-01-07 DIAGNOSIS — H52.4 PRESBYOPIA: ICD-10-CM

## 2022-01-07 DIAGNOSIS — H52.223 REGULAR ASTIGMATISM OF BOTH EYES: ICD-10-CM

## 2022-01-07 DIAGNOSIS — H52.13 MYOPIA OF BOTH EYES: ICD-10-CM

## 2022-01-07 PROBLEM — T24.332D: Status: ACTIVE | Noted: 2021-11-26

## 2022-01-07 PROBLEM — T30.4 CHEMICAL BURN: Status: ACTIVE | Noted: 2021-11-19

## 2022-01-07 PROBLEM — T24.329A: Status: ACTIVE | Noted: 2021-11-26

## 2022-01-07 PROBLEM — T31.0 BURN (ANY DEGREE) INVOLVING LESS THAN 10% OF BODY SURFACE: Status: ACTIVE | Noted: 2021-11-26

## 2022-01-07 PROCEDURE — 92004 COMPRE OPH EXAM NEW PT 1/>: CPT | Performed by: OPTOMETRIST

## 2022-01-07 PROCEDURE — 92015 DETERMINE REFRACTIVE STATE: CPT | Performed by: OPTOMETRIST

## 2022-01-07 ASSESSMENT — VISUAL ACUITY
OS_SC: 20/60
OD_SC: 20/60
METHOD: SNELLEN - LINEAR
OD_SC: 20/30
OS_SC: 20/40

## 2022-01-07 ASSESSMENT — REFRACTION_MANIFEST
OD_SPHERE: -0.50
OD_SPHERE: -0.50
OS_SPHERE: -0.75
OD_AXIS: 178
OS_ADD: +2.50
OS_SPHERE: -1.25
OD_AXIS: 007
OD_CYLINDER: +0.75
OD_CYLINDER: +0.75
OS_CYLINDER: +1.25
OS_AXIS: 174
OD_ADD: +2.50
OS_AXIS: 179
OS_CYLINDER: +1.00

## 2022-01-07 ASSESSMENT — KERATOMETRY
OS_AXISANGLE2_DEGREES: 65
OD_K2POWER_DIOPTERS: 42.75
OD_K1POWER_DIOPTERS: 42
METHOD_AUTO_MANUAL: AUTOMATED
OD_AXISANGLE2_DEGREES: 128
OD_AXISANGLE_DEGREES: 38
OS_K2POWER_DIOPTERS: 42.50
OS_K1POWER_DIOPTERS: 41.75
OS_AXISANGLE_DEGREES: 155

## 2022-01-07 ASSESSMENT — SLIT LAMP EXAM - LIDS
COMMENTS: 1+ DERMATOCHALASIS
COMMENTS: 1+ DERMATOCHALASIS

## 2022-01-07 ASSESSMENT — EXTERNAL EXAM - LEFT EYE: OS_EXAM: NORMAL

## 2022-01-07 ASSESSMENT — TONOMETRY
OD_IOP_MMHG: 18
IOP_METHOD: APPLANATION
OS_IOP_MMHG: 17

## 2022-01-07 ASSESSMENT — CONF VISUAL FIELD
OS_NORMAL: 1
METHOD: COUNTING FINGERS
OD_NORMAL: 1

## 2022-01-07 ASSESSMENT — CUP TO DISC RATIO
OD_RATIO: 0.4
OS_RATIO: 0.3

## 2022-01-07 ASSESSMENT — EXTERNAL EXAM - RIGHT EYE: OD_EXAM: NORMAL

## 2022-01-07 NOTE — PATIENT INSTRUCTIONS
Patient educated on importance of good blood sugar control.  Letter sent to primary care provider with diabetic eye exam report.     Christopher was advised of today's exam findings.  Copy of glasses Rx provided today. Separate distance and reading, per patient request.     Return in 1 year for eye exam, or sooner if needed.    The effects of the dilating drops last for 4- 6 hours.  You will be more sensitive to light and vision will be blurry up close.  Mydriatic sunglasses were given if needed.    Quentin Lester O.D.  [address] [place]  [address]    [phone]

## 2022-01-07 NOTE — LETTER
1/7/2022         RE: Christopher Rodriguez  [address] [place] [address]        Dear Colleague,    Thank you for referring your patient, Christopher Rodriguez, to the [facility]. Please see a copy of my visit note below.    Chief Complaint   Patient presents with     Diabetic Eye Exam       Chief Complaint(s) and History of Present Illness(es)     Diabetic Eye Exam     Vision: is blurred for distance    Diabetes Type: Type 2, on insulin and taking oral medications    Duration: 20 years               Hemoglobin A1C POCT   Date Value Ref Range Status   12/17/2020 7.6 (H) 0 - 5.6 % Final     Comment:     Normal <5.7% Prediabetes 5.7-6.4%  Diabetes 6.5% or higher - adopted from ADA   consensus guidelines.     03/18/2020 8.8 (H) 0 - 5.6 % Final     Comment:     Results confirmed by repeat test  Normal <5.7% Prediabetes 5.7-6.4%  Diabetes 6.5% or higher - adopted from ADA   consensus guidelines.       Hemoglobin A1C   Date Value Ref Range Status   09/30/2021 8.5 (H) 0.0 - 5.6 % Final     Comment:     Normal <5.7%   Prediabetes 5.7-6.4%    Diabetes 6.5% or higher     Note: Adopted from ADA consensus guidelines.        Last Eye Exam: 2 years ago  Dilated Previously: Yes, side effects of dilation explained today    What are you currently using to see?  Readers unsure of campbell uslally about +2.50    Distance Vision Acuity: Noticed gradual change in both eyes    Near Vision Acuity: Satisfied with vision while reading and using computer with readers    Eye Comfort: good  Do you use eye drops? : No  Occupation or Hobbies: Self-employeed    [facility]     Medical, surgical and family histories reviewed and updated 1/7/2022.       OBJECTIVE: See Ophthalmology exam    ASSESSMENT:    ICD-10-CM    1. Encounter for examination of eyes and vision with abnormal findings  Z01.01    2. Type 2 diabetes mellitus without retinopathy (H)  E11.9    3. Myopia of both eyes  H52.13    4. Regular astigmatism of both  eyes  H52.223    5. Presbyopia  H52.4       PLAN:    Christopher Rodriguez aware  eye exam results will be sent to Su Robertson.  Patient Instructions   Patient educated on importance of good blood sugar control.  Letter sent to primary care provider with diabetic eye exam report.     Christopher was advised of today's exam findings.  Copy of glasses Rx provided today. Separate distance and reading, per patient request.     Return in 1 year for eye exam, or sooner if needed.    The effects of the dilating drops last for 4- 6 hours.  You will be more sensitive to light and vision will be blurry up close.  Mydriatic sunglasses were given if needed.    Quentin Lester O.D.  [address] [place]  [address] [phone]               Again, thank you for allowing me to participate in the care of your patient.        Sincerely,        Quentin Lester, OD

## 2022-01-07 NOTE — PROGRESS NOTES
Chief Complaint   Patient presents with     Diabetic Eye Exam       Chief Complaint(s) and History of Present Illness(es)     Diabetic Eye Exam     Vision: is blurred for distance    Diabetes Type: Type 2, on insulin and taking oral medications    Duration: 20 years               Hemoglobin A1C POCT   Date Value Ref Range Status   12/17/2020 7.6 (H) 0 - 5.6 % Final     Comment:     Normal <5.7% Prediabetes 5.7-6.4%  Diabetes 6.5% or higher - adopted from ADA   consensus guidelines.     03/18/2020 8.8 (H) 0 - 5.6 % Final     Comment:     Results confirmed by repeat test  Normal <5.7% Prediabetes 5.7-6.4%  Diabetes 6.5% or higher - adopted from ADA   consensus guidelines.       Hemoglobin A1C   Date Value Ref Range Status   09/30/2021 8.5 (H) 0.0 - 5.6 % Final     Comment:     Normal <5.7%   Prediabetes 5.7-6.4%    Diabetes 6.5% or higher     Note: Adopted from ADA consensus guidelines.        Last Eye Exam: 2 years ago  Dilated Previously: Yes, side effects of dilation explained today    What are you currently using to see?  Readers unsure of campbell uslally about +2.50    Distance Vision Acuity: Noticed gradual change in both eyes    Near Vision Acuity: Satisfied with vision while reading and using computer with readers    Eye Comfort: good  Do you use eye drops? : No  Occupation or Hobbies: Self-employeed    Tammy Marin     Medical, surgical and family histories reviewed and updated 1/7/2022.       OBJECTIVE: See Ophthalmology exam    ASSESSMENT:    ICD-10-CM    1. Encounter for examination of eyes and vision with abnormal findings  Z01.01    2. Type 2 diabetes mellitus without retinopathy (H)  E11.9    3. Myopia of both eyes  H52.13    4. Regular astigmatism of both eyes  H52.223    5. Presbyopia  H52.4       PLAN:    Christopher jc  eye exam results will be sent to Su Robertson.  Patient Instructions   Patient educated on importance of good blood sugar control.  Letter sent to primary  care provider with diabetic eye exam report.     Christopher was advised of today's exam findings.  Copy of glasses Rx provided today. Separate distance and reading, per patient request.     Return in 1 year for eye exam, or sooner if needed.    The effects of the dilating drops last for 4- 6 hours.  You will be more sensitive to light and vision will be blurry up close.  Mydriatic sunglasses were given if needed.    Quentin Lester O.D.  [facility] [place]  [place]  [address]    [phone]

## 2022-01-10 ENCOUNTER — OFFICE VISIT (OUTPATIENT)
Dept: DERMATOLOGY | Facility: CLINIC | Age: 62
End: 2022-01-10
Payer: COMMERCIAL

## 2022-01-10 DIAGNOSIS — L57.0 ACTINIC KERATOSIS: Primary | ICD-10-CM

## 2022-01-10 DIAGNOSIS — D22.9 BENIGN NEVUS: ICD-10-CM

## 2022-01-10 PROCEDURE — 99213 OFFICE O/P EST LOW 20 MIN: CPT | Mod: 25 | Performed by: DERMATOLOGY

## 2022-01-10 PROCEDURE — 17000 DESTRUCT PREMALG LESION: CPT | Performed by: DERMATOLOGY

## 2022-01-10 PROCEDURE — 17003 DESTRUCT PREMALG LES 2-14: CPT | Performed by: DERMATOLOGY
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
## 2022-01-10 NOTE — NURSING NOTE
Christopher Rodriguez's goals for this visit include:   Chief Complaint   Patient presents with     Derm Problem     Diffuse actinic damage - scalp- efudex fu     He requests these members of his care team be copied on today's visit information:     PCP: Su Robertson    Referring Provider:  No referring provider defined for this encounter.    There were no vitals taken for this visit.    Do you need any medication refills at today's visit? No  Shabana Ramsey, CMA on 1/10/2022 at 3:51 PM

## 2022-01-10 NOTE — PATIENT INSTRUCTIONS
Cryotherapy    What is it?    Use of a very cold liquid, such as liquid nitrogen, to freeze and destroy abnormal skin cells that need to be removed    What should I expect?    Tenderness and redness    A small blister that might grow and fill with dark purple blood. There may be crusting.    More than one treatment may be needed if the lesions do not go away.    How do I care for the treated area?    Gently wash the area with your hands when bathing.    Use a thin layer of Vaseline to help with healing. You may use a Band-Aid.     The area should heal within 7-10 days and may leave behind a pink or lighter color.     Do not use an antibiotic or Neosporin ointment.     You may take acetaminophen (Tylenol) for pain.     Call your doctor if you have:    Severe pain    Signs of infection (warmth, redness, cloudy yellow drainage, and or a bad smell)    Questions or concerns    Who should I call with questions?       [facility]: [phone]       [facility]: [phone]       For urgent needs outside of business hours call the [facility] at [phone] and ask for the dermatology resident on call
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
## 2022-03-23 ASSESSMENT — REFRACTION_MANIFEST: METHOD_AUTOREFRACTION: 1

## 2022-08-01 DIAGNOSIS — E11.42 TYPE 2 DIABETES MELLITUS WITH DIABETIC POLYNEUROPATHY, WITHOUT LONG-TERM CURRENT USE OF INSULIN (H): ICD-10-CM

## 2022-08-01 NOTE — TELEPHONE ENCOUNTER
Patient calling for a refill on  glyBURIDE (DIABETA /MICRONASE) 5 MG tablet to be sent to [facility]  pharmacy.  [facility]  2nd Floor  Primary Care

## 2022-08-02 RX ORDER — GLYBURIDE 5 MG/1
TABLET ORAL
Qty: 60 TABLET | Refills: 0 | Status: SHIPPED | OUTPATIENT
Start: 2022-08-02 | End: 2022-08-19

## 2022-08-02 NOTE — TELEPHONE ENCOUNTER
Called patient to inform of refill x1 for medication and the need for a follow up visit. Patient stated that he will wait to schedule a visit due to the process of moving and other responsibilities.     Mary Gibbs, Visit Facilitator

## 2022-08-18 ENCOUNTER — TELEPHONE (OUTPATIENT)
Dept: FAMILY MEDICINE | Facility: CLINIC | Age: 62
End: 2022-08-18

## 2022-08-18 DIAGNOSIS — E11.42 TYPE 2 DIABETES MELLITUS WITH DIABETIC POLYNEUROPATHY, WITHOUT LONG-TERM CURRENT USE OF INSULIN (H): ICD-10-CM

## 2022-08-18 DIAGNOSIS — E78.5 HYPERLIPIDEMIA LDL GOAL <100: Primary | ICD-10-CM

## 2022-08-18 DIAGNOSIS — Z12.5 SCREENING FOR PROSTATE CANCER: ICD-10-CM

## 2022-08-18 NOTE — TELEPHONE ENCOUNTER
Last office visit was 9/30/21, please advise.    If needs visit, ok for virtual or needs to be seen in person?  Gita Freeman RN  [facility]

## 2022-08-18 NOTE — TELEPHONE ENCOUNTER
"Reason for Call:  Other Patient calling to \"Schedule his semi annual diabetic labs.\"  I offered to try to schedule him with Kristi Myers, but he just wants to get his labs done and refill his medications. I did say that most likely they would have him come in for a visit or do a virtual appointment so he can get all that done with a diabetic check. He replied \"That sounds like money padding to me and Dr. Myers will just say come in and do your labs.\" So,  I am sending a note because I do not see any lab orders in the system, and he said he needs refills on his medications. I simply told him I would send note to the clinic for him.   Detailed comments: N/A    Phone Number Patient can be reached at: [phone]    Best Time: Any  Can we leave a detailed message on this number? Yes    Call taken on 8/18/2022 at 3:25 PM by Renea Cota      "

## 2022-08-18 NOTE — TELEPHONE ENCOUNTER
Routing to  to relay provider's message below (Needs visit every 6 months) and assist with making appointment for patient.   Gita Freeman RN  [facility]

## 2022-08-19 RX ORDER — GLYBURIDE 5 MG/1
TABLET ORAL
Qty: 60 TABLET | Refills: 0 | Status: SHIPPED | OUTPATIENT
Start: 2022-08-19 | End: 2022-08-31 | Stop reason: DRUGHIGH

## 2022-08-19 NOTE — TELEPHONE ENCOUNTER
appts scheduled. Pt has lab appt on 8/21/22, please place lab orders. Pt is going to be out of glyburide In a few days please advise on medication.

## 2022-08-21 ENCOUNTER — LAB (OUTPATIENT)
Dept: LAB | Facility: CLINIC | Age: 62
End: 2022-08-21
Payer: COMMERCIAL

## 2022-08-21 DIAGNOSIS — E11.9 TYPE 2 DIABETES MELLITUS WITHOUT COMPLICATION, WITHOUT LONG-TERM CURRENT USE OF INSULIN (H): Primary | ICD-10-CM

## 2022-08-21 DIAGNOSIS — Z12.5 SCREENING FOR PROSTATE CANCER: ICD-10-CM

## 2022-08-21 DIAGNOSIS — E78.5 HYPERLIPIDEMIA LDL GOAL <100: ICD-10-CM

## 2022-08-21 DIAGNOSIS — E11.42 TYPE 2 DIABETES MELLITUS WITH DIABETIC POLYNEUROPATHY, WITHOUT LONG-TERM CURRENT USE OF INSULIN (H): ICD-10-CM

## 2022-08-21 LAB — HBA1C MFR BLD: 8.6 % (ref 0–5.6)

## 2022-08-21 PROCEDURE — 82043 UR ALBUMIN QUANTITATIVE: CPT

## 2022-08-21 PROCEDURE — 83036 HEMOGLOBIN GLYCOSYLATED A1C: CPT

## 2022-08-21 PROCEDURE — 36415 COLL VENOUS BLD VENIPUNCTURE: CPT

## 2022-08-21 PROCEDURE — 80061 LIPID PANEL: CPT

## 2022-08-21 PROCEDURE — G0103 PSA SCREENING: HCPCS

## 2022-08-21 PROCEDURE — 80053 COMPREHEN METABOLIC PANEL: CPT

## 2022-08-22 LAB
ALBUMIN SERPL-MCNC: 3.7 G/DL (ref 3.4–5)
ALP SERPL-CCNC: 114 U/L (ref 40–150)
ALT SERPL W P-5'-P-CCNC: 24 U/L (ref 0–70)
ANION GAP SERPL CALCULATED.3IONS-SCNC: 7 MMOL/L (ref 3–14)
AST SERPL W P-5'-P-CCNC: 24 U/L (ref 0–45)
BILIRUB SERPL-MCNC: 0.8 MG/DL (ref 0.2–1.3)
BUN SERPL-MCNC: 15 MG/DL (ref 7–30)
CALCIUM SERPL-MCNC: 9 MG/DL (ref 8.5–10.1)
CHLORIDE BLD-SCNC: 105 MMOL/L (ref 94–109)
CHOLEST SERPL-MCNC: 178 MG/DL
CO2 SERPL-SCNC: 25 MMOL/L (ref 20–32)
CREAT SERPL-MCNC: 0.93 MG/DL (ref 0.66–1.25)
CREAT UR-MCNC: 292 MG/DL
FASTING STATUS PATIENT QL REPORTED: YES
GFR SERPL CREATININE-BSD FRML MDRD: >90 ML/MIN/1.73M2
GLUCOSE BLD-MCNC: 162 MG/DL (ref 70–99)
HDLC SERPL-MCNC: 44 MG/DL
LDLC SERPL CALC-MCNC: 99 MG/DL
MICROALBUMIN UR-MCNC: 25 MG/L
MICROALBUMIN/CREAT UR: 8.56 MG/G CR (ref 0–17)
NONHDLC SERPL-MCNC: 134 MG/DL
POTASSIUM BLD-SCNC: 4.6 MMOL/L (ref 3.4–5.3)
PROT SERPL-MCNC: 7.8 G/DL (ref 6.8–8.8)
PSA SERPL-MCNC: 0.5 UG/L (ref 0–4)
SODIUM SERPL-SCNC: 137 MMOL/L (ref 133–144)
TRIGL SERPL-MCNC: 177 MG/DL

## 2022-08-29 ENCOUNTER — VIRTUAL VISIT (OUTPATIENT)
Dept: FAMILY MEDICINE | Facility: CLINIC | Age: 62
End: 2022-08-29
Payer: COMMERCIAL

## 2022-08-29 DIAGNOSIS — F32.4 MAJOR DEPRESSIVE DISORDER WITH SINGLE EPISODE, IN PARTIAL REMISSION (H): ICD-10-CM

## 2022-08-29 DIAGNOSIS — E66.01 MORBID OBESITY (H): ICD-10-CM

## 2022-08-29 DIAGNOSIS — Z12.11 SCREEN FOR COLON CANCER: ICD-10-CM

## 2022-08-29 DIAGNOSIS — I10 HYPERTENSION GOAL BP (BLOOD PRESSURE) < 140/90: ICD-10-CM

## 2022-08-29 DIAGNOSIS — E11.42 TYPE 2 DIABETES MELLITUS WITH DIABETIC POLYNEUROPATHY, WITHOUT LONG-TERM CURRENT USE OF INSULIN (H): Primary | ICD-10-CM

## 2022-08-29 DIAGNOSIS — E78.5 HYPERLIPIDEMIA LDL GOAL <100: ICD-10-CM

## 2022-08-29 DIAGNOSIS — N52.8 OTHER MALE ERECTILE DYSFUNCTION: ICD-10-CM

## 2022-08-29 PROCEDURE — 99214 OFFICE O/P EST MOD 30 MIN: CPT | Performed by: NURSE PRACTITIONER

## 2022-08-29 RX ORDER — GLYBURIDE 5 MG/1
10 TABLET ORAL 2 TIMES DAILY WITH MEALS
Qty: 360 TABLET | Refills: 1 | Status: SHIPPED | OUTPATIENT
Start: 2022-08-29 | End: 2023-02-27

## 2022-08-29 RX ORDER — LOSARTAN POTASSIUM 100 MG/1
100 TABLET ORAL DAILY
Qty: 90 TABLET | Refills: 3 | Status: SHIPPED | OUTPATIENT
Start: 2022-08-29 | End: 2023-03-01

## 2022-08-29 RX ORDER — DESVENLAFAXINE 100 MG/1
100 TABLET, EXTENDED RELEASE ORAL DAILY
Qty: 90 TABLET | Refills: 1 | Status: SHIPPED | OUTPATIENT
Start: 2022-08-29 | End: 2023-03-01

## 2022-08-29 RX ORDER — TADALAFIL 10 MG/1
10 TABLET ORAL DAILY PRN
Qty: 10 TABLET | Refills: 3 | Status: SHIPPED | OUTPATIENT
Start: 2022-08-29 | End: 2024-05-16

## 2022-08-29 RX ORDER — BUPROPION HYDROCHLORIDE 300 MG/1
300 TABLET ORAL EVERY MORNING
Qty: 90 TABLET | Refills: 1 | Status: SHIPPED | OUTPATIENT
Start: 2022-08-29 | End: 2023-03-01

## 2022-08-29 RX ORDER — ATORVASTATIN CALCIUM 40 MG/1
40 TABLET, FILM COATED ORAL EVERY MORNING
Qty: 90 TABLET | Refills: 3 | Status: SHIPPED | OUTPATIENT
Start: 2022-08-29 | End: 2023-03-01

## 2022-08-29 ASSESSMENT — PATIENT HEALTH QUESTIONNAIRE - PHQ9
SUM OF ALL RESPONSES TO PHQ QUESTIONS 1-9: 2
10. IF YOU CHECKED OFF ANY PROBLEMS, HOW DIFFICULT HAVE THESE PROBLEMS MADE IT FOR YOU TO DO YOUR WORK, TAKE CARE OF THINGS AT HOME, OR GET ALONG WITH OTHER PEOPLE: NOT DIFFICULT AT ALL
SUM OF ALL RESPONSES TO PHQ QUESTIONS 1-9: 2

## 2022-08-29 NOTE — PROGRESS NOTES
"Christopher is a 62 year old who is being evaluated via a billable telephone visit.      What phone number would you like to be contacted at? [phone]  How would you like to obtain your AVS? Mail a copy    Assessment & Plan     Type 2 diabetes mellitus with diabetic polyneuropathy, without long-term current use of insulin (H)  Refilled. Increasing glyburide to 10 mg BID. He would like to switch to Ozempic to help with weight loss as well.   - metFORMIN (GLUCOPHAGE) 1000 MG tablet; Take 1 tablet (1,000 mg) by mouth 2 times daily (with meals)  - glyBURIDE (DIABETA /MICRONASE) 5 MG tablet; Take 2 tablets (10 mg) by mouth 2 times daily (with meals)  - semaglutide (OZEMPIC) 2 MG/1.5ML SOPN pen; Inject 0.25 mg Subcutaneous every 7 days    Hyperlipidemia LDL goal <100  Refilled.  - atorvastatin (LIPITOR) 40 MG tablet; Take 1 tablet (40 mg) by mouth every morning    Hypertension goal BP (blood pressure) < 140/90  Refilled.  - losartan (COZAAR) 100 MG tablet; Take 1 tablet (100 mg) by mouth daily    Major depressive disorder with single episode, in partial remission (H)  Refilled. Stable. No red flags.  - buPROPion (WELLBUTRIN XL) 300 MG 24 hr tablet; Take 1 tablet (300 mg) by mouth every morning  - desvenlafaxine (PRISTIQ) 100 MG 24 hr tablet; Take 1 tablet (100 mg) by mouth daily    Other male erectile dysfunction  Refilled.  - tadalafil (CIALIS) 10 MG tablet; Take 1 tablet (10 mg) by mouth daily as needed (erectily dysfunction. Take 30-60 min before sexual activity.)    Screen for colon cancer  - Colonoscopy Screening  Referral; Future    Morbid obesity (H)  Possible back order. If unable to get will need to switch back to Trulicity.  - semaglutide (OZEMPIC) 2 MG/1.5ML SOPN pen; Inject 0.25 mg Subcutaneous every 7 days             BMI:   Estimated body mass index is 35.88 kg/m  as calculated from the following:    Height as of 9/30/21: 1.765 m (5' 9.5\").    Weight as of 11/19/21: 111.8 kg (246 lb 8 oz).       See " Patient Instructions    Return in about 3 months (around 11/29/2022).     The benefits, risks and potential side effects were discussed in detail. Black box warnings discussed as relevant. All patient questions were answered. The patient was instructed to follow up immediately if any adverse reactions develop.    Return precautions discussed, including when to seek urgent/emergent care.    Patient verbalizes understanding and agrees with plan of care.      RABIA RASHID [facility] ESTRELLA White is a 62 year old, presenting for the following health issues:  Results (Discuss labs), Refill Request, and Recheck Medication (Alternative for Trulicity. )      History of Present Illness       Reason for visit:  Discuss lab results and medication refills    He eats 0-1 servings of fruits and vegetables daily.He consumes 1 sweetened beverage(s) daily.He exercises with enough effort to increase his heart rate 30 to 60 minutes per day.  He exercises with enough effort to increase his heart rate 7 days per week.   He is taking medications regularly.    Today's PHQ-9         PHQ-9 Total Score: 2    PHQ-9 Q9 Thoughts of better off dead/self-harm past 2 weeks :   Not at all    How difficult have these problems made it for you to do your work, take care of things at home, or get along with other people: Not difficult at all       Diabetes Follow-up      How often are you checking your blood sugar? Not at all    What concerns do you have today about your diabetes? None     Do you have any of these symptoms? (Select all that apply)  No numbness or tingling in feet.  No redness, sores or blisters on feet.  No complaints of excessive thirst.  No reports of blurry vision.  No significant changes to weight.        Hyperlipidemia Follow-Up      Are you regularly taking any medication or supplement to lower your cholesterol?   Yes- atorvastatin    Are you having muscle aches or other side  effects that you think could be caused by your cholesterol lowering medication?  No    Hypertension Follow-up      Do you check your blood pressure regularly outside of the clinic? No     Are you following a low salt diet? No    Are your blood pressures ever more than 140 on the top number (systolic) OR more   than 90 on the bottom number (diastolic), for example 140/90? No    BP Readings from Last 2 Encounters:   11/19/21 134/75   09/30/21 (!) 142/89     Hemoglobin A1C (%)   Date Value   08/21/2022 8.6 (H)   09/30/2021 8.5 (H)   12/17/2020 7.6 (H)   03/18/2020 8.8 (H)     LDL Cholesterol Calculated (mg/dL)   Date Value   08/21/2022 99   09/30/2021 115 (H)   12/17/2020 130 (H)   03/18/2020 110 (H)       Depression Followup    How are you doing with your depression since your last visit? No change    Are you having other symptoms that might be associated with depression? No    Have you had a significant life event?  No     Are you feeling anxious or having panic attacks?   No    Do you have any concerns with your use of alcohol or other drugs? No    Social History     Tobacco Use     Smoking status: Never Smoker     Smokeless tobacco: Never Used   Substance Use Topics     Alcohol use: Yes     Comment: sometimes      Drug use: Never     PHQ 12/28/2020 9/30/2021 8/29/2022   PHQ-9 Total Score 1 2 2   Q9: Thoughts of better off dead/self-harm past 2 weeks Not at all Not at all Not at all     AMBROCIO-7 SCORE 3/18/2020 12/28/2020   Total Score 3 0     Last PHQ-9 8/29/2022   1.  Little interest or pleasure in doing things 0   2.  Feeling down, depressed, or hopeless 0   3.  Trouble falling or staying asleep, or sleeping too much 0   4.  Feeling tired or having little energy 2   5.  Poor appetite or overeating 0   6.  Feeling bad about yourself 0   7.  Trouble concentrating 0   8.  Moving slowly or restless 0   Q9: Thoughts of better off dead/self-harm past 2 weeks 0   PHQ-9 Total Score 2   Difficulty at work, home, or with  people -       Up to 250 lb and wants to work on weight loss    Review of Systems   Constitutional, HEENT, cardiovascular, pulmonary, GI, , musculoskeletal, neuro, skin, endocrine and psych systems are negative, except as otherwise noted.      Objective           Vitals:  No vitals were obtained today due to virtual visit.    Physical Exam   healthy, alert and no distress  PSYCH: Alert and oriented times 3; coherent speech, normal   rate and volume, able to articulate logical thoughts, able   to abstract reason, no tangential thoughts, no hallucinations   or delusions  His affect is normal  RESP: No cough, no audible wheezing, able to talk in full sentences  Remainder of exam unable to be completed due to telephone visits    No results found for any visits on 08/29/22.   See results for labs done 8/21/22            Phone call duration: 9 minutes  + 10 min lab interpretation and chart review    .  ..

## 2022-08-29 NOTE — PATIENT INSTRUCTIONS
At [facility], we strive to deliver an exceptional experience to you, every time we see you. If you receive a survey, please complete it as we do value your feedback.  If you have MyChart, you can expect to receive results automatically within 24 hours of their completion.  Your provider will send a note interpreting your results as well.   If you do not have MyChart, you should receive your results in about a week by mail.    Your care team:                            Family Medicine Internal Medicine   MD Jose Esparza MD Shantel Branch-Fleming, MD Srinivasa Vaka, MD Katya Belousova, RABIA Beth CNP, MD ([facility]   Mikey Shipley, MD Jocelyne Xie MD Amelia Massimini APRN CNP Kim Thein, APRN CNP Bethany Templen, MD             Clinic hours: Monday - Thursday 7 am-6 pm; Fridays 7 am-5 pm.   Urgent care: Monday - Friday 10 am- 8 pm; Saturday and Sunday 9 am-5 pm.    Clinic: [phone]       [facility]: Monday - Thursday 8 am - 7 pm; Friday 8 am - 6 pm  [facility]: [phone]

## 2022-12-01 ENCOUNTER — TRANSFERRED RECORDS (OUTPATIENT)
Dept: MULTI SPECIALTY CLINIC | Facility: CLINIC | Age: 62
End: 2022-12-01

## 2022-12-01 LAB — RETINOPATHY: NORMAL

## 2022-12-21 DIAGNOSIS — F32.4 MAJOR DEPRESSIVE DISORDER WITH SINGLE EPISODE, IN PARTIAL REMISSION (H): ICD-10-CM

## 2022-12-21 RX ORDER — DESVENLAFAXINE 100 MG/1
100 TABLET, EXTENDED RELEASE ORAL DAILY
Qty: 90 TABLET | Refills: 1 | OUTPATIENT
Start: 2022-12-21

## 2022-12-21 RX ORDER — BUPROPION HYDROCHLORIDE 300 MG/1
300 TABLET ORAL EVERY MORNING
Qty: 90 TABLET | Refills: 1 | OUTPATIENT
Start: 2022-12-21

## 2022-12-21 NOTE — TELEPHONE ENCOUNTER
Refills remain on file. Refused.     Renée Sheehan, RN, BSN, PHN  [facility]: [place]

## 2023-01-24 ENCOUNTER — TRANSFERRED RECORDS (OUTPATIENT)
Dept: MULTI SPECIALTY CLINIC | Facility: CLINIC | Age: 63
End: 2023-01-24

## 2023-01-24 LAB — RETINOPATHY: NORMAL

## 2023-02-15 ENCOUNTER — TELEPHONE (OUTPATIENT)
Dept: FAMILY MEDICINE | Facility: CLINIC | Age: 63
End: 2023-02-15
Payer: COMMERCIAL

## 2023-02-15 DIAGNOSIS — E11.42 TYPE 2 DIABETES MELLITUS WITH DIABETIC POLYNEUROPATHY, WITHOUT LONG-TERM CURRENT USE OF INSULIN (H): ICD-10-CM

## 2023-02-16 RX ORDER — DULAGLUTIDE 1.5 MG/.5ML
1.5 INJECTION, SOLUTION SUBCUTANEOUS
Qty: 6 ML | Refills: 0 | OUTPATIENT
Start: 2023-02-16

## 2023-02-20 RX ORDER — DULAGLUTIDE 0.75 MG/.5ML
0.75 INJECTION, SOLUTION SUBCUTANEOUS
Qty: 2 ML | Refills: 0 | Status: SHIPPED | OUTPATIENT
Start: 2023-02-20 | End: 2023-03-01 | Stop reason: DRUGHIGH

## 2023-02-20 NOTE — TELEPHONE ENCOUNTER
Called and spoke to pt.     Pt states that he was previously taking ozempic but he wants to go back on trulicity.  States that he was previously taking 1.5mg.    Pt is wanting a refill on med as soon as possible as he has been without med for over a week.    Assisted in scheduling virtual visit to follow up.      Nancy Lemus RN  [facility]

## 2023-02-20 NOTE — TELEPHONE ENCOUNTER
Ok to stop ozempic  Needs to start trulicity at 0.75 mg/week for at least 4 weeks which I sent. Can discuss increasing o 1.5 mg at visit next week

## 2023-02-20 NOTE — TELEPHONE ENCOUNTER
Patient is calling to check on the status of this request states that he has been out for a week and 1/2 and need this filled asap

## 2023-02-20 NOTE — TELEPHONE ENCOUNTER
RN calling patient to relay message from provider below.     Patient verbalized understanding and denies further questions or concerns at this time.     MARIA ELENA Mccullough, RN  [facility]

## 2023-02-21 ENCOUNTER — TELEPHONE (OUTPATIENT)
Dept: FAMILY MEDICINE | Facility: CLINIC | Age: 63
End: 2023-02-21
Payer: COMMERCIAL

## 2023-02-21 NOTE — TELEPHONE ENCOUNTER
Patient Quality Outreach    Patient is due for the following:   Diabetes -  A1C, Eye Exam, Diabetic Follow-Up Visit and Foot Exam    Next Steps:   Patient has upcoming appointment, these items will be addressed at that time.    Type of outreach:    Chart review performed, no outreach needed.    Questions for provider review:    None     Josh Brown

## 2023-02-27 ENCOUNTER — VIRTUAL VISIT (OUTPATIENT)
Dept: FAMILY MEDICINE | Facility: CLINIC | Age: 63
End: 2023-02-27
Payer: COMMERCIAL

## 2023-02-27 DIAGNOSIS — E78.5 HYPERLIPIDEMIA LDL GOAL <100: ICD-10-CM

## 2023-02-27 DIAGNOSIS — R39.15 URINARY URGENCY: ICD-10-CM

## 2023-02-27 DIAGNOSIS — R39.198 SLOW URINARY STREAM: ICD-10-CM

## 2023-02-27 DIAGNOSIS — Z12.5 SCREENING FOR PROSTATE CANCER: ICD-10-CM

## 2023-02-27 DIAGNOSIS — I10 HYPERTENSION GOAL BP (BLOOD PRESSURE) < 140/90: ICD-10-CM

## 2023-02-27 DIAGNOSIS — F32.4 MAJOR DEPRESSIVE DISORDER WITH SINGLE EPISODE, IN PARTIAL REMISSION (H): ICD-10-CM

## 2023-02-27 DIAGNOSIS — E11.42 TYPE 2 DIABETES MELLITUS WITH DIABETIC POLYNEUROPATHY, WITHOUT LONG-TERM CURRENT USE OF INSULIN (H): Primary | ICD-10-CM

## 2023-02-27 PROCEDURE — 99442 PR PHYSICIAN TELEPHONE EVALUATION 11-20 MIN: CPT | Mod: 95 | Performed by: NURSE PRACTITIONER

## 2023-02-27 NOTE — PROGRESS NOTES
Christopher is a 63 year old who is being evaluated via a billable telephone visit.      What phone number would you like to be contacted at? [phone]  How would you like to obtain your AVS? Mail a copy  Distant Location (provider location):  On-site    Assessment & Plan     Type 2 diabetes mellitus with diabetic polyneuropathy, without long-term current use of insulin (H)  Due for labs. Increasing trulicity to 1.5 mg/week. Decrease glyburide to 10 mg daily. Continue to monitor BG. Follow up 3 months.   - metFORMIN (GLUCOPHAGE) 1000 MG tablet; Take 1 tablet (1,000 mg) by mouth 2 times daily (with meals)  - glyBURIDE (DIABETA /MICRONASE) 5 MG tablet; Take 2 tablets (10 mg) by mouth daily (with breakfast)  - dulaglutide (TRULICITY) 1.5 MG/0.5ML pen; Inject 1.5 mg Subcutaneous every 7 days Start after 4 doses of 0.75 mg/week  - Basic metabolic panel  (Ca, Cl, CO2, Creat, Gluc, K, Na, BUN); Future  - Hemoglobin A1c; Future    Hypertension goal BP (blood pressure) < 140/90  Refilled. Due for labs.  - losartan (COZAAR) 100 MG tablet; Take 1 tablet (100 mg) by mouth daily  - Basic metabolic panel  (Ca, Cl, CO2, Creat, Gluc, K, Na, BUN); Future    Hyperlipidemia LDL goal <100  Refilled. Due for labs.  - atorvastatin (LIPITOR) 40 MG tablet; Take 1 tablet (40 mg) by mouth every morning    Major depressive disorder with single episode, in partial remission (H)  Doing well. No thoughts of harm. Refilled.  - buPROPion (WELLBUTRIN XL) 300 MG 24 hr tablet; Take 1 tablet (300 mg) by mouth every morning  - desvenlafaxine (PRISTIQ) 100 MG 24 hr tablet; Take 1 tablet (100 mg) by mouth daily    Urinary urgency  Will get UA/UC. PSA normal <1 year ago. Suspect possible BPH. Will start flomax and refer to urology for further evaluation. Instructed not to use flomax and cialis on the same day.  - tamsulosin (FLOMAX) 0.4 MG capsule; Take 1 capsule (0.4 mg) by mouth daily  - Adult Urology  Referral; Future  - UA with Microscopic - lab  collect; Future  - Urine Culture Aerobic Bacterial - lab collect; Future    Slow urinary stream  As above.  - tamsulosin (FLOMAX) 0.4 MG capsule; Take 1 capsule (0.4 mg) by mouth daily  - Adult Urology  Referral; Future    Screening for prostate cancer  Patient requesting recheck  - PSA, screen; Future             See Patient Instructions    Return in about 3 months (around 5/27/2023).     The benefits, risks and potential side effects were discussed in detail. Black box warnings discussed as relevant. All patient questions were answered. The patient was instructed to follow up immediately if any adverse reactions develop.    Return precautions discussed, including when to seek urgent/emergent care.    Patient verbalizes understanding and agrees with plan of care.     [facility]ID [facility] is a 63 year old, presenting for the following health issues:  No chief complaint on file.      HPI     Diabetes Follow-up    How often are you checking your blood sugar? One time daily  What time of day are you checking your blood sugars (select all that apply)?  Before meals  Have you had any blood sugars above 200?  No  Have you had any blood sugars below 70?  No    What symptoms do you notice when your blood sugar is low?  None    What concerns do you have today about your diabetes? None     Do you have any of these symptoms? (Select all that apply)  No numbness or tingling in feet.  No redness, sores or blisters on feet.  No complaints of excessive thirst.  No reports of blurry vision.  No significant changes to weight.    Have you had a diabetic eye exam in the last 12 months? Yes- Date of last eye exam: 6-9 months ago at outside eye clinic     in the mornings      Hyperlipidemia Follow-Up      Are you regularly taking any medication or supplement to lower your cholesterol?   Yes- atorvastatin    Are you having muscle aches or other side  effects that you think could be caused by your cholesterol lowering medication?  No    Hypertension Follow-up      Do you check your blood pressure regularly outside of the clinic? No     Are you following a low salt diet? No    Are your blood pressures ever more than 140 on the top number (systolic) OR more   than 90 on the bottom number (diastolic), for example 140/90? No    BP Readings from Last 2 Encounters:   11/19/21 134/75   09/30/21 (!) 142/89     Hemoglobin A1C (%)   Date Value   08/21/2022 8.6 (H)   09/30/2021 8.5 (H)   12/17/2020 7.6 (H)   03/18/2020 8.8 (H)     LDL Cholesterol Calculated (mg/dL)   Date Value   08/21/2022 99   09/30/2021 115 (H)   12/17/2020 130 (H)   03/18/2020 110 (H)       Depression Followup    How are you doing with your depression since your last visit? No change    Are you having other symptoms that might be associated with depression? No    Have you had a significant life event?  No     Are you feeling anxious or having panic attacks?   No    Do you have any concerns with your use of alcohol or other drugs? No    Social History     Tobacco Use     Smoking status: Never     Smokeless tobacco: Never   Substance Use Topics     Alcohol use: Yes     Comment: sometimes      Drug use: Never     PHQ 12/28/2020 9/30/2021 8/29/2022   PHQ-9 Total Score 1 2 2   Q9: Thoughts of better off dead/self-harm past 2 weeks Not at all Not at all Not at all     AMBROCIO-7 SCORE 3/18/2020 12/28/2020   Total Score 3 0         Currently on trulicity 0.75 mg/week - recently switched from ozempic. Wants to increase to 1.5 mg/week    Getting up few times to urinate at night and stream is smaller. Last PSA 0.5 8/21/22. No abdominal pain, N/V. No concern for STI      Review of Systems   Constitutional, HEENT, cardiovascular, pulmonary, GI, , musculoskeletal, neuro, skin, endocrine and psych systems are negative, except as otherwise noted.      Objective           Vitals:  No vitals were obtained today due to  virtual visit.    Physical Exam   healthy, alert and no distress  PSYCH: Alert and oriented times 3; coherent speech, normal   rate and volume, able to articulate logical thoughts, able   to abstract reason, no tangential thoughts, no hallucinations   or delusions  His affect is normal  RESP: No cough, no audible wheezing, able to talk in full sentences  Remainder of exam unable to be completed due to telephone visits    Labs: pending - patient has lab only visit scheduled            Phone call duration: 14:10 minutes

## 2023-03-01 RX ORDER — TAMSULOSIN HYDROCHLORIDE 0.4 MG/1
0.4 CAPSULE ORAL DAILY
Qty: 30 CAPSULE | Refills: 1 | Status: SHIPPED | OUTPATIENT
Start: 2023-03-01 | End: 2023-05-03

## 2023-03-01 RX ORDER — LOSARTAN POTASSIUM 100 MG/1
100 TABLET ORAL DAILY
Qty: 90 TABLET | Refills: 3 | Status: SHIPPED | OUTPATIENT
Start: 2023-03-01 | End: 2024-04-18

## 2023-03-01 RX ORDER — ATORVASTATIN CALCIUM 40 MG/1
40 TABLET, FILM COATED ORAL EVERY MORNING
Qty: 90 TABLET | Refills: 3 | Status: SHIPPED | OUTPATIENT
Start: 2023-03-01 | End: 2024-04-18

## 2023-03-01 RX ORDER — GLYBURIDE 5 MG/1
10 TABLET ORAL
Qty: 180 TABLET | Refills: 1 | Status: SHIPPED | OUTPATIENT
Start: 2023-03-01 | End: 2023-05-31

## 2023-03-01 RX ORDER — DESVENLAFAXINE 100 MG/1
100 TABLET, EXTENDED RELEASE ORAL DAILY
Qty: 90 TABLET | Refills: 1 | Status: SHIPPED | OUTPATIENT
Start: 2023-03-01 | End: 2023-10-12

## 2023-03-01 RX ORDER — BUPROPION HYDROCHLORIDE 300 MG/1
300 TABLET ORAL EVERY MORNING
Qty: 90 TABLET | Refills: 1 | Status: SHIPPED | OUTPATIENT
Start: 2023-03-01 | End: 2023-10-12

## 2023-03-01 NOTE — PATIENT INSTRUCTIONS
Continue Trulicity 0.75 mg/week for 4 weeks total and then increase to Trulicity 1.5 mg/week.  When you increase Trulicity to 1.5 mg/week, then decrease the glyburide to 10 mg in the morning ONLY so you don't have low blood sugars    Continue to check BG in the morning before breakfast    Start tamsulosin (Flomax) for urinary frequency and smaller stream. Do not take tamsulosin on the same day you take tadalafil (Cialis) because it can cause an unsafe drop in blood pressure. Please follow up with urology for further evaluation.

## 2023-03-24 ENCOUNTER — TELEPHONE (OUTPATIENT)
Dept: FAMILY MEDICINE | Facility: CLINIC | Age: 63
End: 2023-03-24
Payer: COMMERCIAL

## 2023-03-24 NOTE — TELEPHONE ENCOUNTER
Pt calling and wants to talk about adding new med for depression.  Advised appointment needed, appointment made.  Pt states he is doing ok for his depression but feels it could be even better. He denies being in danger of hurting himself or others.  Ivelisse PINZONN, RN

## 2023-04-03 ENCOUNTER — VIRTUAL VISIT (OUTPATIENT)
Dept: FAMILY MEDICINE | Facility: CLINIC | Age: 63
End: 2023-04-03
Payer: COMMERCIAL

## 2023-04-03 DIAGNOSIS — F32.9 MAJOR DEPRESSIVE DISORDER WITH CURRENT ACTIVE EPISODE, UNSPECIFIED DEPRESSION EPISODE SEVERITY, UNSPECIFIED WHETHER RECURRENT: Primary | ICD-10-CM

## 2023-04-03 PROCEDURE — 99207 PR NO CHARGE LOS: CPT | Performed by: NURSE PRACTITIONER

## 2023-04-03 PROCEDURE — 96127 BRIEF EMOTIONAL/BEHAV ASSMT: CPT | Mod: 95 | Performed by: NURSE PRACTITIONER

## 2023-04-03 ASSESSMENT — PATIENT HEALTH QUESTIONNAIRE - PHQ9
SUM OF ALL RESPONSES TO PHQ QUESTIONS 1-9: 11
SUM OF ALL RESPONSES TO PHQ QUESTIONS 1-9: 11
10. IF YOU CHECKED OFF ANY PROBLEMS, HOW DIFFICULT HAVE THESE PROBLEMS MADE IT FOR YOU TO DO YOUR WORK, TAKE CARE OF THINGS AT HOME, OR GET ALONG WITH OTHER PEOPLE: SOMEWHAT DIFFICULT

## 2023-04-03 NOTE — PROGRESS NOTES
"Christopher is a 63 year old who is being evaluated via a billable video visit.      How would you like to obtain your AVS? Mail a copy  If the video visit is dropped, the invitation should be resent by: {video visit invitation (Optional) :294414}  Will anyone else be joining your video visit? {:229528}  {If patient encounters technical issues they should call [phone] :493645}        {PROVIDER CHARTING PREFERENCE:073505}    Subjective   Christopher is a 63 year old, presenting for the following health issues:  No chief complaint on file.         View : No data to display.              HPI     {SUPERLIST (Optional):610212}  {additonal problems for provider to add (Optional):400395}      Review of Systems   {ROS COMP (Optional):817241}      Objective           Vitals:  No vitals were obtained today due to virtual visit.    Physical Exam   {video visit exam brief selected:061790::\"GENERAL: Healthy, alert and no distress\",\"EYES: Eyes grossly normal to inspection.  No discharge or erythema, or obvious scleral/conjunctival abnormalities.\",\"RESP: No audible wheeze, cough, or visible cyanosis.  No visible retractions or increased work of breathing.  \",\"SKIN: Visible skin clear. No significant rash, abnormal pigmentation or lesions.\",\"NEURO: Cranial nerves grossly intact.  Mentation and speech appropriate for age.\",\"PSYCH: Mentation appears normal, affect normal/bright, judgement and insight intact, normal speech and appearance well-groomed.\"}    {Diagnostic Test Results (Optional):692552}    {AMBULATORY ATTESTATION (Optional):230411}        Video-Visit Details    Type of service:  Video Visit     Originating Location (pt. Location): {video visit patient location:841045::\"Home\"}  {PROVIDER LOCATION On-site should be selected for visits conducted from your clinic location or adjoining [facility] hospital, academic office, or other nearby [facility] building. Off-site should be selected for all other provider locations, including " "home:267214}  Distant Location (provider location):  {virtual location provider:776226}  Platform used for Video Visit: {Virtual Visit Platforms:530265::\"[employer]\"}    "

## 2023-04-03 NOTE — PROGRESS NOTES
Christopher is a 63 year old who is being evaluated via a billable telephone visit.      What phone number would you like to be contacted at? [phone]  How would you like to obtain your AVS? Mail a copy  Distant Location (provider location):  On-site    Assessment & Plan     Major depressive disorder with current active episode, unspecified depression episode severity, unspecified whether recurrent  Patient requesting to start Rexulti for depression. He is currently on Pristiq and wellbutrin. I recommend evaluation with psychiatry as this is beyond what I am comfortable with in primary care. Looking back, I see he requested this when he asked to make appointment - I would have referred to psychiatry at that time. We briefly discussed his request. He denies thoughts of harm and feels safe.   - Adult Mental Health  Referral; Future                 RABIA RASHID CNP  [facility] ESTRELLA White is a 63 year old, presenting for the following health issues:  Depression        4/3/2023     2:15 PM   Additional Questions   Roomed by Janet     History of Present Illness       Mental Health Follow-up:  Patient presents to follow-up on Depression.Patient's depression since last visit has been:  Worse  The patient is not having other symptoms associated with depression.      Any significant life events: No  Patient is feeling anxious or having panic attacks.  Patient has no concerns about alcohol or drug use.    He eats 0-1 servings of fruits and vegetables daily.He consumes 1 sweetened beverage(s) daily.He exercises with enough effort to increase his heart rate 30 to 60 minutes per day.  He exercises with enough effort to increase his heart rate 5 days per week.   He is taking medications regularly.    Today's PHQ-9         PHQ-9 Total Score: 11    PHQ-9 Q9 Thoughts of better off dead/self-harm past 2 weeks :   Not at all    How difficult have these problems made it for you to  do your work, take care of things at home, or get along with other people: Somewhat difficult     Currently on Pristiq and Wellbutrin  Wants to discuss Rexulti     Social History     Tobacco Use     Smoking status: Never     Smokeless tobacco: Never   Vaping Use     Vaping status: Never Used   Substance Use Topics     Alcohol use: Yes     Comment: sometimes      Drug use: Never         9/30/2021     9:11 AM 8/29/2022     2:07 PM 4/3/2023     2:18 PM   PHQ   PHQ-9 Total Score 2 2 11   Q9: Thoughts of better off dead/self-harm past 2 weeks Not at all Not at all Not at all         3/18/2020     3:48 PM 12/28/2020     4:09 PM   AMBROCIO-7 SCORE   Total Score 3 0         4/3/2023     2:18 PM   Last PHQ-9   1.  Little interest or pleasure in doing things 3   2.  Feeling down, depressed, or hopeless 0   3.  Trouble falling or staying asleep, or sleeping too much 2   4.  Feeling tired or having little energy 2   5.  Poor appetite or overeating 3   6.  Feeling bad about yourself 0   7.  Trouble concentrating 1   8.  Moving slowly or restless 0   Q9: Thoughts of better off dead/self-harm past 2 weeks 0   PHQ-9 Total Score 11       Suicide Assessment Five-step Evaluation and Treatment (SAFE-T)          Review of Systems   Constitutional, HEENT, cardiovascular, pulmonary, gi and gu systems are negative, except as otherwise noted.      Objective    Vitals - Patient Reported  Pain Score: No Pain (0)        Physical Exam   healthy, alert and no distress  PSYCH: Alert and oriented times 3; coherent speech, normal   rate and volume, able to articulate logical thoughts, able   to abstract reason, no tangential thoughts, no hallucinations   or delusions  His affect is normal  RESP: No cough, no audible wheezing, able to talk in full sentences  Remainder of exam unable to be completed due to telephone visits                Phone call duration: 2 minutes

## 2023-04-07 ENCOUNTER — LAB (OUTPATIENT)
Dept: LAB | Facility: CLINIC | Age: 63
End: 2023-04-07
Payer: COMMERCIAL

## 2023-04-07 DIAGNOSIS — R39.15 URINARY URGENCY: ICD-10-CM

## 2023-04-07 DIAGNOSIS — I10 HYPERTENSION GOAL BP (BLOOD PRESSURE) < 140/90: ICD-10-CM

## 2023-04-07 DIAGNOSIS — Z12.5 SCREENING FOR PROSTATE CANCER: ICD-10-CM

## 2023-04-07 DIAGNOSIS — E11.42 TYPE 2 DIABETES MELLITUS WITH DIABETIC POLYNEUROPATHY, WITHOUT LONG-TERM CURRENT USE OF INSULIN (H): ICD-10-CM

## 2023-04-07 LAB
ALBUMIN UR-MCNC: NEGATIVE MG/DL
ANION GAP SERPL CALCULATED.3IONS-SCNC: 3 MMOL/L (ref 3–14)
APPEARANCE UR: CLEAR
BACTERIA #/AREA URNS HPF: ABNORMAL /HPF
BILIRUB UR QL STRIP: NEGATIVE
BUN SERPL-MCNC: 16 MG/DL (ref 7–30)
CALCIUM SERPL-MCNC: 9.4 MG/DL (ref 8.5–10.1)
CHLORIDE BLD-SCNC: 101 MMOL/L (ref 94–109)
CO2 SERPL-SCNC: 28 MMOL/L (ref 20–32)
COLOR UR AUTO: YELLOW
CREAT SERPL-MCNC: 0.96 MG/DL (ref 0.66–1.25)
GFR SERPL CREATININE-BSD FRML MDRD: 89 ML/MIN/1.73M2
GLUCOSE BLD-MCNC: 223 MG/DL (ref 70–99)
GLUCOSE UR STRIP-MCNC: 100 MG/DL
HBA1C MFR BLD: 9.8 % (ref 0–5.6)
HGB UR QL STRIP: NEGATIVE
KETONES UR STRIP-MCNC: NEGATIVE MG/DL
LEUKOCYTE ESTERASE UR QL STRIP: NEGATIVE
NITRATE UR QL: NEGATIVE
PH UR STRIP: 5.5 [PH] (ref 5–7)
POTASSIUM BLD-SCNC: 4.1 MMOL/L (ref 3.4–5.3)
PSA SERPL-MCNC: 0.66 UG/L (ref 0–4)
RBC #/AREA URNS AUTO: ABNORMAL /HPF
SODIUM SERPL-SCNC: 132 MMOL/L (ref 133–144)
SP GR UR STRIP: 1.02 (ref 1–1.03)
SQUAMOUS #/AREA URNS AUTO: ABNORMAL /LPF
UROBILINOGEN UR STRIP-ACNC: 0.2 E.U./DL
WBC #/AREA URNS AUTO: ABNORMAL /HPF

## 2023-04-07 PROCEDURE — 80048 BASIC METABOLIC PNL TOTAL CA: CPT

## 2023-04-07 PROCEDURE — 36415 COLL VENOUS BLD VENIPUNCTURE: CPT

## 2023-04-07 PROCEDURE — 87086 URINE CULTURE/COLONY COUNT: CPT

## 2023-04-07 PROCEDURE — 83036 HEMOGLOBIN GLYCOSYLATED A1C: CPT

## 2023-04-07 PROCEDURE — 81001 URINALYSIS AUTO W/SCOPE: CPT

## 2023-04-07 PROCEDURE — G0103 PSA SCREENING: HCPCS

## 2023-04-09 LAB — BACTERIA UR CULT: NO GROWTH

## 2023-05-02 DIAGNOSIS — R39.198 SLOW URINARY STREAM: ICD-10-CM

## 2023-05-02 DIAGNOSIS — R39.15 URINARY URGENCY: ICD-10-CM

## 2023-05-03 RX ORDER — TAMSULOSIN HYDROCHLORIDE 0.4 MG/1
0.4 CAPSULE ORAL DAILY
Qty: 30 CAPSULE | Refills: 1 | Status: SHIPPED | OUTPATIENT
Start: 2023-05-03 | End: 2023-05-31

## 2023-05-03 NOTE — TELEPHONE ENCOUNTER
"Called and spoke with pt. Informed him of provider's message below. He verbalized understanding. Gave number to schedule with urology.     Patient states he never received letter for lab results from 4/7. Pt asked about lab results. Writer relayed provider's result note from 4/7 labs.     Patient states he has been taking 1.5mg dose of trulicity weekly, but is wondering if provider wants to increase dose again based on comment in result note \"Increasing your trulicity should help with this.\"    Should patient continue taking 1.5mg weekly? Do you still want pt to follow up around June? Lab only visit or visit with primary care provider?    Gita Freeman RN    [facility]    "

## 2023-05-03 NOTE — TELEPHONE ENCOUNTER
I will refill. Please remind him not to take if takes Cialis (tadalifil) as it can cause an unsafe drop in blood pressure  Please remind patient to schedule follow up with urology - order placed couple months ago

## 2023-05-04 NOTE — TELEPHONE ENCOUNTER
Called patient back. Relayed provider's recommendations below. Patient did not want to follow up with a medication therapy management pharmacist at this time. Assisted patient in scheduling a follow up appointment with provider. Patient is scheduled on 5/31 with provider in clinic. Patient verbalized understanding and had no further questions or concerns at this time.     Agnes Yeung RN   [facility] Triage Nurse

## 2023-05-04 NOTE — TELEPHONE ENCOUNTER
He should really have visit to discuss his numbers  He should try to give this dose 3 months and it was increased end of Feb. I recommend scheduling follow up at the end of the month - he can see primary care or [facility] pharmacist - let me know if he wants to see [facility] pharmacist (this might be a good option because they can do deep dive and streamline medications)
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
## 2023-05-11 ENCOUNTER — VIRTUAL VISIT (OUTPATIENT)
Dept: PSYCHIATRY | Facility: CLINIC | Age: 63
End: 2023-05-11
Attending: NURSE PRACTITIONER
Payer: COMMERCIAL

## 2023-05-11 DIAGNOSIS — F41.1 GENERALIZED ANXIETY DISORDER: ICD-10-CM

## 2023-05-11 DIAGNOSIS — F33.1 MAJOR DEPRESSIVE DISORDER, RECURRENT EPISODE, MODERATE (H): Primary | ICD-10-CM

## 2023-05-11 PROCEDURE — 99204 OFFICE O/P NEW MOD 45 MIN: CPT | Mod: VID | Performed by: NURSE PRACTITIONER

## 2023-05-11 ASSESSMENT — PATIENT HEALTH QUESTIONNAIRE - PHQ9
SUM OF ALL RESPONSES TO PHQ QUESTIONS 1-9: 11
10. IF YOU CHECKED OFF ANY PROBLEMS, HOW DIFFICULT HAVE THESE PROBLEMS MADE IT FOR YOU TO DO YOUR WORK, TAKE CARE OF THINGS AT HOME, OR GET ALONG WITH OTHER PEOPLE: SOMEWHAT DIFFICULT
SUM OF ALL RESPONSES TO PHQ QUESTIONS 1-9: 11

## 2023-05-11 NOTE — Clinical Note
Please call to schedule patient for follow-up in 2 weeks.  Thank you!   RABIA Anguiano, PERNELL-BC [facility] Service ([facility]) [facility]

## 2023-05-11 NOTE — PATIENT INSTRUCTIONS
**For crisis resources, please see the information at the end of this document**     Thank you for coming to the [facility] MENTAL [facility] CLIN[facility].    TREATMENT PLAN:  Medications:   START rexulti 0.5 mg every day. Script sent for #30.  Check out  coupon (https://www.rexulti.com/savings or phone: 1-784.380.7854) for additional cost options.  CONTINUE pristiq 100 mg per PCP. No script needed.  CONTINUE bupropion  mg per PCP. No script needed.  Continue all other medications per primary care provider.     Follow Up:  Schedule an appointment with me in 2 weeks or sooner as needed.  Call [facility] at [phone] to schedule.  Follow up with primary care provider as planned or sooner if needed for acute medical concerns.  Call the psychiatric nurse line with medication questions or concerns at [phone].  The Daily Hundredt may be used to communicate with your provider, but this is not intended to be used for emergencies.    Consults / Referrals:   Referral placed for Interventional Psychiatry ([email or web address]://[email or web address]/our-[email or web address]-xbdetp-yzugupizfiotoc-nqhizxwamd-program) [facility] will call you to coordinate your care as prescribed by your provider. If you don't hear from a representative within 2 business days, please call [phone].  - May also consider community resources if quicker intake: There are several ketamine or TMS clinics in the [facility] area that you can call to see about their availability for new patients and if they accept your insurance.   - [place] [facility]: [email or web address] - [phone]  - [place] [facility]: https://[email or web address]/ - [phone]  - [facility]:  https://www.[facility].com/ketamine-clinic-[place]/ - [phone]  - [facility]: https://www.[email or web address]/  - [facility] [place] - [email or web address].[employer]/   -  [email or web address].[employer]/      Psychoeducation:  REXULTI USE:  Atypical need for cardiometabolic monitoring with medication- B/P, weight, blood sugar, cholesterol.  Need to monitor for abnormal movements taught        Financial Assistance [phone]  [facility] Billing [phone]  Central Billing Office, [facility]: [phone]  [place] Billing [phone]  Medical Records [phone]  [place] Patient Bill of Rights [email or web address]       MENTAL HEALTH CRISIS RESOURCES:  For a emergency help, please call 911 or go to the nearest Emergency Department.     Emergency Walk-In Options:   EmPATH Unit @ [place] [facility] ([place]): [phone] - Specialized mental health emergency area designed to be calming  [facility] ([place]): [phone]  [facility] ([place]): [phone]  [facility]): [phone]    [facility] Crisis Information:   [place]: [phone]  [place]: [phone]  [place] ([place]IC[place]) - Adult: [phone]     Child: [phone]  [place] - Adult: [phone]     Child: [phone]  [place]: [phone]  List of all [facility] resources:   [place]://[place].gov/dhs/people-we-serve/adults/health-care/mental-health/resources/crisis-contacts.jsp    National Crisis Information:   National Suicide & Crisis Lifeline: Call 398        For online chat options, visit https://suicidepreventionlifeline.org/chat/  Poison Control Center: 9-726-743-3226  Poison Control Center: 6-139-769-9707  Trans Lifeline: 1-358.813.8230 - Hotline for transgender people of all ages  The [employer]: 1-846.511.1211 - Hotline for LGBT youth     For Non-Emergency Support:   Fast Tracker: Mental Health & Substance Use Disorder Resources -   https://www.fasttrackermn.org/       Again thank you for choosing [facility] MENTAL HEALTH & ADDICTION [facility] and please let us know how we  "can best partner with you to improve you and your family's health.    You may be receiving a survey regarding this appointment. We would love to have your feedback, both positive and negative. The survey is done by an external company, so your answers are anonymous.        Patient Education   Collaborative Care Psychiatry Service  What to Expect  Here's what to expect from your Collaborative Care Psychiatry Service (CCPS).   About CCPS  CCPS means 2 people work together to help you get better. You'll meet with a behavioral health clinician and a psychiatric doctor. A behavioral health clinician helps people with mental health problems by talking with them. A psychiatric doctor helps people by giving them medicine.  How it works  At every visit, you'll see the behavioral health clinician (BHC) first. They'll talk with you about how you're doing and teach you how to feel better.   Then you'll see the psychiatric doctor. This doctor can help you deal with troubling thoughts and feelings by giving you medicine. They'll make sure you know the plan for your care.   CCPS usually takes 3 to 6 visits. If you need more visits, we may have you start seeing a different psychiatric doctor for ongoing care.  If you have any questions or concerns, we'll be glad to talk with you.  About visits  Be open  At your visits, please talk openly about your problems. It may feel hard, but it's the best way for us to help you.  Cancelling visits  If you can't come to your visit, please call us right away at [phone]. If you don't cancel at least 24 hours (1 full day) before your visit, that's \"late cancellation.\"  Being late to visits  Being very late is the same as not showing up. You will be a \"no show\" if:  Your appointment starts with a BHC, and you're more than 15 minutes late for a 30-minute (half hour) visit. This will also cancel your appointment with the psychiatric doctor.  Your appointment is with a psychiatric doctor only, " and you're more than 15 minutes late for a 30-minute (half hour) visit.  Your appointment is with a psychiatric doctor only, and you're more than 30 minutes late for a 60-minute (full hour) visit.  If you cancel late or don't show up 2 times within 6 months, we may end your care.   Getting help between visits  If you need help between visits, you can call us Monday to Friday from 8 a.m. to 4:30 p.m. at [phone].  Emergency care  Call 911 or go to the nearest emergency department if your life or someone else's life is in danger.  Call 958 anytime to reach the national Suicide and Crisis hotline.  Medicine refills  To refill your medicine, call your pharmacy. You can also call [facility]'s Behavioral Access at [phone], Monday to Friday, 8 a.m. to 4:30 p.m. It can take 1 to 3 business days to get a refill.   Forms, letters, and tests  You may have papers to fill out, like FMLA, short-term disability, and workability. We can help you with these forms at your visits, but you must have an appointment. You may need more than 1 visit for this, to be in an intensive therapy program, or both.  Before we can give you medicine for ADHD, we may refer you to get tested for it or confirm it another way.  We may not be able to give you an emotional support animal letter.  We don't do mental health checks ordered by the court.   We don't do mental health testing, but we can refer you to get tested.   Thank you for choosing us for your care.  For informational purposes only. Not to replace the advice of your health care provider. Copyright   2022 [facility]. All rights reserved. [employer] [id number] - 12/22.

## 2023-05-11 NOTE — PROGRESS NOTES
Virtual Visit Details    Type of service:  Video Visit     Originating Location (pt. Location): Home    Distant Location (provider location):  Off-site  Platform used for Video Visit: DoximHubblr phone call only - no video capability today

## 2023-05-11 NOTE — NURSING NOTE
Is the patient currently in the state of [place]? YES    Visit mode:VIDEO    If the visit is dropped, the patient can be reconnected by: VIDEO VISIT: Text to cell phone: [phone]    Will anyone else be joining the visit? NO      How would you like to obtain your AVS? MyChart    Are changes needed to the allergy or medication list? NO    Reason for visit: Video Visit

## 2023-05-11 NOTE — Clinical Note
Thank you for the Psychiatry referral to the [facility] Psych[facility]ry Service (CCPS). Our psychiatry providers act as a specialty service for Primary Care Providers in the [facility] who seek to optimize medications for unstable patients.  Once medications have been optimized, our providers discharge the patient back to the referring Primary Care Provider for ongoing medication management.  This type of system allows our providers to serve a high volume of patients.   Please see my Impression and Plan. I will continue to work with them in stabilizing their mood.  If you have any questions or concerns, please let me know. Thank you again!  RABIA Anguiano, PMSIMP-BC Collaborative Care Psychiatry Service (CCPS) [facility]

## 2023-05-12 PROBLEM — F41.1 GENERALIZED ANXIETY DISORDER: Status: ACTIVE | Noted: 2023-05-12

## 2023-05-12 PROBLEM — F33.1 MAJOR DEPRESSIVE DISORDER, RECURRENT EPISODE, MODERATE (H): Status: ACTIVE | Noted: 2023-05-12

## 2023-05-25 ENCOUNTER — VIRTUAL VISIT (OUTPATIENT)
Dept: PSYCHIATRY | Facility: CLINIC | Age: 63
End: 2023-05-25
Payer: COMMERCIAL

## 2023-05-25 DIAGNOSIS — F33.1 MAJOR DEPRESSIVE DISORDER, RECURRENT EPISODE, MODERATE (H): Primary | ICD-10-CM

## 2023-05-25 DIAGNOSIS — F41.1 GENERALIZED ANXIETY DISORDER: ICD-10-CM

## 2023-05-25 PROCEDURE — 99442 PR PHYSICIAN TELEPHONE EVALUATION 11-20 MIN: CPT | Mod: VID | Performed by: NURSE PRACTITIONER

## 2023-05-25 NOTE — Clinical Note
Please call to schedule patient for follow-up in 3-4 weeks. Please inform patient telehub is not an option and it will continue to be virtual only at this time. Thank you!   RABIA Anguiano, PERNELL-BC Collaborative Care Psychiatry Service (CCPS) [facility]
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
## 2023-05-25 NOTE — PATIENT INSTRUCTIONS
**For crisis resources, please see the information at the end of this document**     Thank you for coming to the [facility] [place] [facility].    TREATMENT PLAN:  Medications:   START rexulti 0.5 mg every day. Script sent for #30 previously.  Check out  coupon (https://www.rexulti.com/savings or phone: 1-831.902.3730) for additional cost options.  CONTINUE pristiq 100 mg per PCP. No script needed.  CONTINUE bupropion  mg per PCP. No script needed.  Continue all other medications per primary care provider.     Follow Up:  Schedule an appointment with me in 3-4 weeks or sooner as needed.  Call [facility] at [phone] to schedule.  Follow up with primary care provider as planned or sooner if needed for acute medical concerns.  Call the psychiatric nurse line with medication questions or concerns at [phone].  Visionary Mobilehart may be used to communicate with your provider, but this is not intended to be used for emergencies.    Consults / Referrals:   Follow-up on referral placed for Interventional Psychiatry - please call [phone].  ([email or web address])   - May also consider community resources if quicker intake: There are several ketamine or TMS clinics in the [facility] area that you can call to see about their availability for new patients and if they accept your insurance.   - [place] [facility]: https://www.[place]/ - [phone]  - [place] [facility] [place]: [email or web address]/ - [phone]  - [facility]:  https://www.[facility].com/ketamine-clinic-[place]/ - [phone]  - [facility]: https://www.[employer]/  - [facility] [place] - [place]://[place].com/   - [email or web address]/    Psychoeducation:  REXULTI USE:  Atypical need for cardiometabolic monitoring with medication- B/P, weight,  blood sugar, cholesterol.  Need to monitor for abnormal movements taught        Financial Assistance [phone]  [facility] Billing [phone]  Central Billing Office, [facility]: [phone]  [facility]illing [phone]  Medical Records [phone]  [facility] Patient Bill of Rights [email or web address]       MENTAL HEALTH CRISIS RESOURCES:  For a emergency help, please call 911 or go to the nearest Emergency Department.     Emergency Walk-In Options:   EmPATH Unit @ [facility] ([place]): [phone] - Specialized mental health emergency area designed to be calming  [facility] ([place]): [phone]  [facility] ([place]): [phone]  [facility]): [phone]    [facility] Crisis Information:   [place]: [phone]  [place]: [phone]  [place] ([place]) - Adult: [phone]     Child: [phone]  [place] - Adult: [phone]     Child: [phone]  [place]: [phone]  List of all [facility] resources:   https://[place].gov/dhs/people-we-serve/adults/health-care/mental-health/resources/crisis-contacts.jsp    National Crisis Information:   National Suicide & Crisis Lifeline: Call 988        For online chat options, visit https://suicidepreventionlifeline.org/chat/  Poison Control Center: 6-091-262-9209  Poison Control Center: 3-762-188-4199  Trans Lifeline: 1-542.520.3408 - Hotline for transgender people of all ages  The [employer] Project: 2-742-731-5047 - Hotline for LGBT youth     For Non-Emergency Support:   Fast Tracker: Mental Health & Substance Use Disorder Resources -   https://www.Company.comtrackermn.org/       Again thank you for choosing [facility] and please let us know how we can best partner with you to improve you and your family's health.    You may be receiving a survey regarding this appointment. We would love to have  "your feedback, both positive and negative. The survey is done by an external company, so your answers are anonymous.        Patient Education   Collaborative Care Psychiatry Service  What to Expect  Here's what to expect from your Collaborative Care Psychiatry Service (CCPS).   About CCPS  CCPS means 2 people work together to help you get better. You'll meet with a behavioral health clinician and a psychiatric doctor. A behavioral health clinician helps people with mental health problems by talking with them. A psychiatric doctor helps people by giving them medicine.  How it works  At every visit, you'll see the behavioral health clinician (BHC) first. They'll talk with you about how you're doing and teach you how to feel better.   Then you'll see the psychiatric doctor. This doctor can help you deal with troubling thoughts and feelings by giving you medicine. They'll make sure you know the plan for your care.   CCPS usually takes 3 to 6 visits. If you need more visits, we may have you start seeing a different psychiatric doctor for ongoing care.  If you have any questions or concerns, we'll be glad to talk with you.  About visits  Be open  At your visits, please talk openly about your problems. It may feel hard, but it's the best way for us to help you.  Cancelling visits  If you can't come to your visit, please call us right away at [phone]. If you don't cancel at least 24 hours (1 full day) before your visit, that's \"late cancellation.\"  Being late to visits  Being very late is the same as not showing up. You will be a \"no show\" if:  Your appointment starts with a BHC, and you're more than 15 minutes late for a 30-minute (half hour) visit. This will also cancel your appointment with the psychiatric doctor.  Your appointment is with a psychiatric doctor only, and you're more than 15 minutes late for a 30-minute (half hour) visit.  Your appointment is with a psychiatric doctor only, and you're more than 30 " minutes late for a 60-minute (full hour) visit.  If you cancel late or don't show up 2 times within 6 months, we may end your care.   Getting help between visits  If you need help between visits, you can call us Monday to Friday from 8 a.m. to 4:30 p.m. at [phone].  Emergency care  Call 911 or go to the nearest emergency department if your life or someone else's life is in danger.  Call 988 anytime to reach the national Suicide and Crisis hotline.  Medicine refills  To refill your medicine, call your pharmacy. You can also call [facility]'s Behavioral Access at [phone], Monday to Friday, 8 a.m. to 4:30 p.m. It can take 1 to 3 business days to get a refill.   Forms, letters, and tests  You may have papers to fill out, like FMLA, short-term disability, and workability. We can help you with these forms at your visits, but you must have an appointment. You may need more than 1 visit for this, to be in an intensive therapy program, or both.  Before we can give you medicine for ADHD, we may refer you to get tested for it or confirm it another way.  We may not be able to give you an emotional support animal letter.  We don't do mental health checks ordered by the court.   We don't do mental health testing, but we can refer you to get tested.   Thank you for choosing us for your care.  For informational purposes only. Not to replace the advice of your health care provider. Copyright   2022 [facility]. All rights reserved. [employer] [id number] - 12/22.

## 2023-05-25 NOTE — NURSING NOTE
Is the patient currently in the state of [place]? YES    Visit mode:VIDEO    If the visit is dropped, the patient can be reconnected by: VIDEO VISIT: Text to cell phone:   Telephone Information:   Mobile [phone]       Will anyone else be joining the visit? No  (If patient encounters technical issues they should call [phone])    How would you like to obtain your AVS? MyChart    Are changes needed to the allergy or medication list? NO    Rooming Documentation: Assigned questionnaire(s) completed .    Reason for visit: RECHECK     Joan Andrews [facility]      Care team has reviewed attendance agreement with patient. Patient advised that two failed appointments within 6 months may lead to termination of current episode of care.

## 2023-05-31 ENCOUNTER — LAB (OUTPATIENT)
Dept: FAMILY MEDICINE | Facility: CLINIC | Age: 63
End: 2023-05-31

## 2023-05-31 ENCOUNTER — OFFICE VISIT (OUTPATIENT)
Dept: FAMILY MEDICINE | Facility: CLINIC | Age: 63
End: 2023-05-31
Payer: COMMERCIAL

## 2023-05-31 VITALS
BODY MASS INDEX: 35.79 KG/M2 | OXYGEN SATURATION: 97 % | HEART RATE: 78 BPM | HEIGHT: 70 IN | RESPIRATION RATE: 20 BRPM | WEIGHT: 250 LBS | SYSTOLIC BLOOD PRESSURE: 146 MMHG | TEMPERATURE: 97.7 F | DIASTOLIC BLOOD PRESSURE: 86 MMHG

## 2023-05-31 DIAGNOSIS — Z12.11 SCREEN FOR COLON CANCER: ICD-10-CM

## 2023-05-31 DIAGNOSIS — L98.9 LESION OF SKIN OF SCALP: ICD-10-CM

## 2023-05-31 DIAGNOSIS — E11.42 TYPE 2 DIABETES MELLITUS WITH DIABETIC POLYNEUROPATHY, WITHOUT LONG-TERM CURRENT USE OF INSULIN (H): Primary | ICD-10-CM

## 2023-05-31 DIAGNOSIS — I10 HYPERTENSION GOAL BP (BLOOD PRESSURE) < 140/90: ICD-10-CM

## 2023-05-31 DIAGNOSIS — R39.198 SLOW URINARY STREAM: ICD-10-CM

## 2023-05-31 DIAGNOSIS — F33.1 MAJOR DEPRESSIVE DISORDER, RECURRENT EPISODE, MODERATE (H): Primary | ICD-10-CM

## 2023-05-31 DIAGNOSIS — E78.5 HYPERLIPIDEMIA LDL GOAL <100: ICD-10-CM

## 2023-05-31 PROBLEM — E11.9 TYPE 2 DIABETES MELLITUS WITHOUT COMPLICATION, WITHOUT LONG-TERM CURRENT USE OF INSULIN (H): Status: RESOLVED | Noted: 2020-03-18 | Resolved: 2023-05-31

## 2023-05-31 PROCEDURE — 99214 OFFICE O/P EST MOD 30 MIN: CPT | Performed by: NURSE PRACTITIONER

## 2023-05-31 RX ORDER — ARIPIPRAZOLE 5 MG/1
TABLET ORAL
Qty: 30 TABLET | Refills: 0 | Status: SHIPPED | OUTPATIENT
Start: 2023-05-31 | End: 2023-07-10

## 2023-05-31 RX ORDER — GLYBURIDE 5 MG/1
10 TABLET ORAL
Qty: 180 TABLET | Refills: 1 | Status: SHIPPED | OUTPATIENT
Start: 2023-05-31 | End: 2023-10-25

## 2023-05-31 ASSESSMENT — PAIN SCALES - GENERAL: PAINLEVEL: NO PAIN (1)

## 2023-05-31 NOTE — PATIENT INSTRUCTIONS
Diagnoses: Urinary urgency   Slow urinary stream   Order: Adult Urology  Referral        Comment: Please be aware that coverage of these services is subject to the terms and limitations of your health insurance plan.  Call member services at your health plan with any benefit or coverage questions.   [facility] will call you to coordinate care as prescribed your provider. If you don t hear from a representative within 2 business days, please call [phone].     Patient Education       At [facility], we strive to deliver an exceptional experience to you, every time we see you. If you receive a survey, please complete it as we do value your feedback.  If you have MyChart, you can expect to receive results automatically within 24 hours of their completion.  Your provider will send a note interpreting your results as well.   If you do not have MyChart, you should receive your results in about a week by mail.    Your care team:                            Family Medicine Internal Medicine   MD Jose Esparza MD Shantel Branch-Fleming, MD Srinivasa Vaka, MD Katya Belousova, PA-C Megan Sheeley, APRN CNP Nam Ho, MD ([place]) Pediatrics   RADHA Davis MD Paula Brito, MD Amelia Massimini APRN RABIA Olguin CNP, MD Charanya Pasupathi, MD          Clinic hours: Monday - Thursday 7 am-6 pm; Fridays 7 am-5 pm.   Urgent care: Monday - Friday 10 am- 8 pm; Saturday and Sunday 9 am-5 pm.    Clinic: [phone]       [facility]: Monday - Thursday 8 am - 7 pm; Friday 8 am - 6 pm  [facility]: [phone]

## 2023-05-31 NOTE — LETTER
June 22, 2023      Christopher Rodriguez  [address] [place] [facility] [address]      Mr. Rodriguez,     Your colon cancer screening is normal. Please repeat in 3 years. Please let us know if you have any questions.     Thank you for allowing us to participate in your care.   RABIA Webb CNP     Resulted Orders   COLOGUARD(EXACT SCIENCES)   Result Value Ref Range    COLOGUARD-ABSTRACT Negative Negative      Comment:        NEGATIVE TEST RESULT. A negative Cologuard result indicates a low likelihood that a colorectal cancer (CRC) or advanced adenoma (adenomatous polyps with more advanced pre-malignant features)  is present. The chance that a person with a negative Cologuard test has a colorectal cancer is less than 1 in 1500 (negative predictive value >99.9%) or has an  advanced adenoma is less than  5.3% (negative predictive value 94.7%). These data are based on a prospective cross-sectional study of 10,000 individuals at average risk for colorectal cancer who were screened with both Cologuard and colonoscopy. (Pham JOHNSON. et al, N Engl J Med 2014;370(14):5220-8530) The normal value (reference range) for this assay is negative.    COLOGUARD RE-SCREENING RECOMMENDATION: Periodic colorectal cancer screening is an important part of preventive healthcare for asymptomatic individuals at average risk for colorectal cancer.  Following a negative Cologuard result, the American Cancer Society and U.S.  Multi-Society Task Force screening guidelines recommend a Cologuard re-screening interval of 3 years.   References: American Cancer Society Guideline for Colorectal Cancer Screening: https://www.cancer.org/cancer/colon-rectal-cancer/yhooalwmv-tnxcuhfrx-pebkvhn/acs-recommendations.html.; Richar TILLMAN, Hugh STANTON, Twin MOFFETT, Colorectal Cancer Screening: Recommendations for Physicians and Patients from the U.S. Multi-Society Task Force on Colorectal Cancer Screening , Am J Gastroenterology 2017; 112:7303-4621.    TEST DESCRIPTION:  Composite algorithmic analysis of stool DNA-biomarkers with hemoglobin immunoassay.   Quantitative values of individual biomarkers are not reportable and are not associated with individual biomarker result reference ranges. Cologuard is intended for colorectal cancer screening of adults of either sex, 45 years or older, who are at average-risk for colorectal cancer (CRC). Cologuard has been approved for use by the U.S. FDA. The performance of Cologuard was  established in a cross sectional study of average-risk adults aged 50-84. Cologuard performance in patients ages 45 to 49 years was estimated by sub-group analysis of near-age groups. Colonoscopies performed for a positive result may find as the most clinically significant lesion: colorectal cancer [4.0%], advanced adenoma (including sessile serrated polyps greater than or equal to 1cm diameter) [20%] or non- advanced adenoma [31%]; or no colorectal neoplasia [45%]. These estimates are derived from a prospective cross-sectional screening study of 10,000 individuals at average risk for colorectal cancer who were screened with both Cologuard and colonoscopy. (Pham CAI et al, N Engl J Med 2014;370(14):3353-7389.) Cologuard may produce a false negative or false positive result (no colorectal cancer or precancerous polyp present at colonoscopy follow up). A negative Cologuard test result does not guarantee the absence of CRC or advanced adenoma (pre-cancer). The current Cologuard  screening interval is every 3 years. (American Cancer Society and U.S. Multi-Society Task Force). Cologuard performance data in a 10,000 patient pivotal study using colonoscopy as the reference method can be accessed at the following location: www.[employer]/results. Additional description of the Cologuard test process, warnings and precautions can be found at www.WishLinkrd.com.

## 2023-05-31 NOTE — PROGRESS NOTES
"  Assessment & Plan     Type 2 diabetes mellitus with diabetic polyneuropathy, without long-term current use of insulin (H)  BG remain elevated. Increasing Trulicity to 3 mg - no contraindications. Discussed monitoring neck for masses/lump as well as difficulty swallowing. Continue other diabetes medications. Continue to monitor. Work on diet/exercise - get at least 30 min of exercise most days of the week. He will do fasting labs in July when next due (last labs April)  - Dulaglutide 3 MG/0.5ML SOPN; Inject 3 mg Subcutaneous every 7 days  - metFORMIN (GLUCOPHAGE) 1000 MG tablet; Take 1 tablet (1,000 mg) by mouth 2 times daily (with meals)  - glyBURIDE (DIABETA /MICRONASE) 5 MG tablet; Take 2 tablets (10 mg) by mouth daily (with breakfast)  - Basic metabolic panel  (Ca, Cl, CO2, Creat, Gluc, K, Na, BUN); Future  - Hemoglobin A1c; Future    Lesion of skin of scalp  Had frozen off by derm but lesion returned. I recommend follow up with dermatology again.  - Adult Dermatology Referral; Future    Hyperlipidemia LDL goal <100  He will do fasting labs in July when next due (last labs April)  - Lipid panel reflex to direct LDL Fasting; Future  - ALT; Future    Hypertension goal BP (blood pressure) < 140/90  Elevated today. Low salt diet, exercise as above.     Slow urinary stream  Recommend follow up with urology.    Screen for colon cancer  - DIANE(EXACT SCIENCES); Future    He notes right hand cramps at times around thumb. Offered ortho referral - declined - he will let us know if he changes his mind.     BMI:   Estimated body mass index is 35.87 kg/m  as calculated from the following:    Height as of this encounter: 1.778 m (5' 10\").    Weight as of this encounter: 113.4 kg (250 lb).   Weight management plan: Discussed healthy diet and exercise guidelines    See Patient Instructions    The benefits, risks and potential side effects were discussed in detail. Black box warnings discussed as relevant. All patient " questions were answered. The patient was instructed to follow up immediately if any adverse reactions develop.    Return precautions discussed, including when to seek urgent/emergent care.    Patient verbalizes understanding and agrees with plan of care. Patient stable for discharge.      RABIA RASHID CNP [facility] XOCHITL White is a 63 year old, presenting for the following health issues:  Diabetes        5/31/2023    12:49 PM   Additional Questions   Roomed by alba   Accompanied by self     Forms 5/31/2023   Any forms needing to be completed Yes     History of Present Illness       Diabetes:   He presents for follow up of diabetes.  He is checking home blood glucose a few times a month. He checks blood glucose at bedtime.  Blood glucose is sometimes over 200 and never under 70. He is aware of hypoglycemia symptoms including shakiness, dizziness and weakness. He is concerned about blood sugar frequently over 200.  He is having numbness in feet and burning in feet. The patient has had a diabetic eye exam in the last 12 months. Eye exam performed on fall. Location of last eye exam [facility]        He eats 2-3 servings of fruits and vegetables daily.He consumes 1 sweetened beverage(s) daily.He exercises with enough effort to increase his heart rate 10 to 19 minutes per day.  He exercises with enough effort to increase his heart rate 4 days per week.   He is taking medications regularly.     Eye exam [facility] - Dec 2022     this AM. No personal or family history of medullary thyroid carcinoma or multiple endocrine neoplasia syndrome type 2. Interested in increasing trulicity    Stopped flomax because was making him feel dizzy  Still having LUTS            Review of Systems   Constitutional, HEENT, cardiovascular, pulmonary, GI, , musculoskeletal, neuro, skin, endocrine and psych systems are negative, except as otherwise noted.      Objective  "   BP (!) 146/86 (BP Location: Left arm, Patient Position: Chair, Cuff Size: Adult Regular)   Pulse 78   Temp 97.7  F (36.5  C) (Tympanic)   Resp 20   Ht 1.778 m (5' 10\")   Wt 113.4 kg (250 lb)   SpO2 97%   BMI 35.87 kg/m    Body mass index is 35.87 kg/m .  Physical Exam   GENERAL: healthy, alert and no distress  EYES: Eyes grossly normal to inspection, PERRL and conjunctivae and sclerae normal  HENT: ear canals and TM's normal, nose and mouth without ulcers or lesions  NECK: no adenopathy, no asymmetry, masses, or scars and thyroid normal to palpation  RESP: lungs clear to auscultation - no rales, rhonchi or wheezes  CV: regular rate and rhythm, normal S1 S2, no S3 or S4, no murmur, click or rub, no peripheral edema and peripheral pulses strong  MS: no gross musculoskeletal defects noted, no edema  SKIN: dry, scaly lesion to top of scalp  PSYCH: mentation appears normal, flat affect  Diabetic foot exam: normal DP and PT pulses, no trophic changes or ulcerative lesions and normal sensory exam      Lab on 04/07/2023   Component Date Value Ref Range Status     Prostate Specific Antigen Screen 04/07/2023 0.66  0.00 - 4.00 ug/L Final     Sodium 04/07/2023 132 (L)  133 - 144 mmol/L Final     Potassium 04/07/2023 4.1  3.4 - 5.3 mmol/L Final     Chloride 04/07/2023 101  94 - 109 mmol/L Final     Carbon Dioxide (CO2) 04/07/2023 28  20 - 32 mmol/L Final     Anion Gap 04/07/2023 3  3 - 14 mmol/L Final     Urea Nitrogen 04/07/2023 16  7 - 30 mg/dL Final     Creatinine 04/07/2023 0.96  0.66 - 1.25 mg/dL Final     Calcium 04/07/2023 9.4  8.5 - 10.1 mg/dL Final     Glucose 04/07/2023 223 (H)  70 - 99 mg/dL Final     GFR Estimate 04/07/2023 89  >60 mL/min/1.73m2 Final    eGFR calculated using 2021 CKD-EPI equation.     Hemoglobin A1C 04/07/2023 9.8 (H)  0.0 - 5.6 % Final     Color Urine 04/07/2023 Yellow  Colorless, Straw, Light Yellow, Yellow Final     Appearance Urine 04/07/2023 Clear  Clear Final     Glucose Urine " 04/07/2023 100 (A)  Negative mg/dL Final     Bilirubin Urine 04/07/2023 Negative  Negative Final     Ketones Urine 04/07/2023 Negative  Negative mg/dL Final     Specific Gravity Urine 04/07/2023 1.025  1.003 - 1.035 Final     Blood Urine 04/07/2023 Negative  Negative Final     pH Urine 04/07/2023 5.5  5.0 - 7.0 Final     Protein Albumin Urine 04/07/2023 Negative  Negative mg/dL Final     Urobilinogen Urine 04/07/2023 0.2  0.2, 1.0 E.U./dL Final     Nitrite Urine 04/07/2023 Negative  Negative Final     Leukocyte Esterase Urine 04/07/2023 Negative  Negative Final     Culture 04/07/2023 No Growth   Final     Bacteria Urine 04/07/2023 Few (A)  None Seen /HPF Final     RBC Urine 04/07/2023 None Seen  0-2 /HPF /HPF Final     WBC Urine 04/07/2023 None Seen  0-5 /HPF /HPF Final     Squamous Epithelials Urine 04/07/2023 Few (A)  None Seen /LPF Final

## 2023-06-21 LAB — NONINV COLON CA DNA+OCC BLD SCRN STL QL: NEGATIVE

## 2023-06-22 NOTE — RESULT ENCOUNTER NOTE
Please send letter:    Mr. Rodriguez,  Your colon cancer screening is normal. Please repeat in 3 years. Please let us know if you have any questions.  Thank you for allowing us to participate in your care.  RABIA Webb CNP

## 2023-06-26 ENCOUNTER — VIRTUAL VISIT (OUTPATIENT)
Dept: PSYCHIATRY | Facility: CLINIC | Age: 63
End: 2023-06-26
Payer: COMMERCIAL

## 2023-06-26 DIAGNOSIS — F41.1 GENERALIZED ANXIETY DISORDER: ICD-10-CM

## 2023-06-26 DIAGNOSIS — F33.1 MAJOR DEPRESSIVE DISORDER, RECURRENT EPISODE, MODERATE (H): Primary | ICD-10-CM

## 2023-06-26 PROCEDURE — 99214 OFFICE O/P EST MOD 30 MIN: CPT | Mod: VID | Performed by: NURSE PRACTITIONER

## 2023-06-26 ASSESSMENT — PATIENT HEALTH QUESTIONNAIRE - PHQ9
SUM OF ALL RESPONSES TO PHQ QUESTIONS 1-9: 9
SUM OF ALL RESPONSES TO PHQ QUESTIONS 1-9: 9
10. IF YOU CHECKED OFF ANY PROBLEMS, HOW DIFFICULT HAVE THESE PROBLEMS MADE IT FOR YOU TO DO YOUR WORK, TAKE CARE OF THINGS AT HOME, OR GET ALONG WITH OTHER PEOPLE: SOMEWHAT DIFFICULT

## 2023-06-26 NOTE — NURSING NOTE
Is the patient currently in the state of [place]? YES    Visit mode:VIDEO    If the visit is dropped, the patient can be reconnected by: VIDEO VISIT: Text to cell phone: [phone]    Will anyone else be joining the visit? NO      How would you like to obtain your AVS? MyChart    Are changes needed to the allergy or medication list? NO    Reason for visit: EBENEZER BROWN
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
## 2023-06-26 NOTE — PATIENT INSTRUCTIONS
**For crisis resources, please see the information at the end of this document**     Thank you for coming to the [facility] [place] [facility].    TREATMENT PLAN:  Medications:   CONTINUE aripiprazole 5 mg every day. Script sent for 1 month.  CONTINUE pristiq 100 mg per PCP. No script needed.  CONTINUE bupropion  mg per PCP. No script needed.  Continue all other medications per primary care provider.     Follow Up:  Schedule an appointment with me in 4 weeks or sooner as needed.  Call [facility] at [phone] to schedule.  Follow up with primary care provider as planned or sooner if needed for acute medical concerns.  Call the psychiatric nurse line with medication questions or concerns at [phone].  ReachLocal may be used to communicate with your provider, but this is not intended to be used for emergencies.    Consults / Referrals:   Follow-up as planned with Interventional Psychiatry.    Psychoeducation:  ABILIFY (aripiprazole) USE:  Atypical need for cardiometabolic monitoring with medication- B/P, weight, blood sugar, cholesterol.  Need to monitor for abnormal movements taught        Financial Assistance [phone]  [facility] Billing [phone]  Central Billing Office, ealth: [phone]  [facility]illing [phone]  Medical Records [phone]  [facility] Patient Bill of Rights [email or web address]       MENTAL HEALTH CRISIS RESOURCES:  For a emergency help, please call 911 or go to the nearest Emergency Department.     Emergency Walk-In Options:   EmPATH Unit @ [facility] ([place]): [phone] - Specialized mental health emergency area designed to be calming  [facility] ([place]): [phone]  [facility] ([place]): [phone]  [facility] ([place]): [phone]    [facility] Crisis Information:   [place]:  [phone]  [place]: [phone]  [place] (COPE) - Adult: [phone]     Child: [phone]  [place] - Adult: [phone]     Child: [phone]  [place]: [phone]  List of all [facility] resources:   [email or web address]-contacts.[email or web address]    National Crisis Information:   National Suicide & Crisis Lifeline: Call 988        For online chat options, visit https://suicidepreventionlifeline.org/chat/  Poison Control Center: 5-101-744-5130  Poison Control Center: 4-522-736-6183  Trans Lifeline: 7-090-231-0197 - Hotline for transgender people of all ages  The [place] Project: 2-300-941-5636 - Hotline for LGBT youth     For Non-Emergency Support:   Fast Tracker: Mental Health & Substance Use Disorder Resources -   https://www.Flow Stud[employer]hitn.org/       Again thank you for choosing [facility] [place] [facility] and please let us know how we can best partner with you to improve you and your family's health.    You may be receiving a survey regarding this appointment. We would love to have your feedback, both positive and negative. The survey is done by an external company, so your answers are anonymous.        Patient Education   Collaborative Care Psychiatry Service  What to Expect  Here's what to expect from your Collaborative Care Psychiatry Service (CCPS).   About CCPS  CCPS means 2 people work together to help you get better. You'll meet with a behavioral health clinician and a psychiatric doctor. A behavioral health clinician helps people with mental health problems by talking with them. A psychiatric doctor helps people by giving them medicine.  How it works  At every visit, you'll see the behavioral health clinician (BHC) first. They'll talk with you about how you're doing and teach you how to feel better.   Then you'll see the psychiatric doctor. This doctor can help you deal with troubling thoughts and feelings by  "giving you medicine. They'll make sure you know the plan for your care.   CCPS usually takes 3 to 6 visits. If you need more visits, we may have you start seeing a different psychiatric doctor for ongoing care.  If you have any questions or concerns, we'll be glad to talk with you.  About visits  Be open  At your visits, please talk openly about your problems. It may feel hard, but it's the best way for us to help you.  Cancelling visits  If you can't come to your visit, please call us right away at [phone]. If you don't cancel at least 24 hours (1 full day) before your visit, that's \"late cancellation.\"  Being late to visits  Being very late is the same as not showing up. You will be a \"no show\" if:  Your appointment starts with a [facility], and you're more than 15 minutes late for a 30-minute (half hour) visit. This will also cancel your appointment with the psychiatric doctor.  Your appointment is with a psychiatric doctor only, and you're more than 15 minutes late for a 30-minute (half hour) visit.  Your appointment is with a psychiatric doctor only, and you're more than 30 minutes late for a 60-minute (full hour) visit.  If you cancel late or don't show up 2 times within 6 months, we may end your care.   Getting help between visits  If you need help between visits, you can call us Monday to Friday from 8 a.m. to 4:30 p.m. at [phone].  Emergency care  Call 911 or go to the nearest emergency department if your life or someone else's life is in danger.  Call 988 anytime to reach the national Suicide and Crisis hotline.  Medicine refills  To refill your medicine, call your pharmacy. You can also call [facility]'s Behavioral Access at [phone], Monday to Friday, 8 a.m. to 4:30 p.m. It can take 1 to 3 business days to get a refill.   Forms, letters, and tests  You may have papers to fill out, like FMLA, short-term disability, and workability. We can help you with these forms at your visits, but " you must have an appointment. You may need more than 1 visit for this, to be in an intensive therapy program, or both.  Before we can give you medicine for ADHD, we may refer you to get tested for it or confirm it another way.  We may not be able to give you an emotional support animal letter.  We don't do mental health checks ordered by the court.   We don't do mental health testing, but we can refer you to get tested.   Thank you for choosing us for your care.  For informational purposes only. Not to replace the advice of your health care provider. Copyright   2022 [facility]. All rights reserved. XINT[employer] [id number] - 12/22.

## 2023-07-05 ENCOUNTER — TELEPHONE (OUTPATIENT)
Dept: FAMILY MEDICINE | Facility: CLINIC | Age: 63
End: 2023-07-05
Payer: COMMERCIAL

## 2023-07-06 NOTE — TELEPHONE ENCOUNTER
Pharmacy requesting refill for Tamsulosin HCL 0.4mg caps .    Rx not found on patient's medication list.

## 2023-07-10 ENCOUNTER — TELEPHONE (OUTPATIENT)
Dept: PSYCHIATRY | Facility: CLINIC | Age: 63
End: 2023-07-10
Payer: COMMERCIAL

## 2023-07-10 DIAGNOSIS — F33.1 MAJOR DEPRESSIVE DISORDER, RECURRENT EPISODE, MODERATE (H): ICD-10-CM

## 2023-07-10 RX ORDER — ARIPIPRAZOLE 5 MG/1
5 TABLET ORAL DAILY
Qty: 30 TABLET | Refills: 1 | Status: SHIPPED | OUTPATIENT
Start: 2023-07-10 | End: 2023-07-28

## 2023-07-10 NOTE — TELEPHONE ENCOUNTER
Reason for call:  Medication   If this is a refill request, has the caller requested the refill from the pharmacy already? Yes  Will the patient be using a [facility]? No  Name of the pharmacy and phone number for the current request: [facility]    Name of the medication requested: aripiprazole    Other request: Pt states his pharmacy has requested this med several times with no response.  He has been out 4 days now    Phone number to reach patient:  Home number on file [phone] (home)    Best Time:  ASAP    Can we leave a detailed message on this number?  YES    Travel screening: Not Applicable

## 2023-07-10 NOTE — TELEPHONE ENCOUNTER
Date of Last Office Visit: 6/26/23  Date of Next Office Visit: 7/25/23  No shows since last visit: 0  Cancellations since last visit: 0    Medication requested: ARIPiprazole (ABILIFY) 5 MG tablet Date last ordered: 5/31/23 Qty: 30 Refills: 0     Review of [place] ?: NA    Lapse in medication adherence greater than 5 days?: For 4 days  If yes, call patient and gather details: na  Medication refill request verified as identical to current order?: yes  Result of Last DAM, VPA, Li+ Level, CBC, or Carbamazepine Level (at or since last visit): N/A      Last visit treatment plan: CONTINUE aripiprazole 5 mg every day. Script sent for 1 month.    CONTINUE pristiq 100 mg per PCP. No script needed.  CONTINUE bupropion  mg per PCP. No script needed.    [x]Medication refilled per  Medication Refill in Ambulatory Care  policy.  []Medication unable to be refilled by RN due to criteria not met as indicated below:    []Eligibility - not seen in the last year   []Supervision - no future appointment   []Compliance - no shows, cancellations or lapse in therapy   []Verification - order discrepancy   []Controlled medication   []Medication not included in policy   []90-day supply request   []Other

## 2023-07-11 ENCOUNTER — OFFICE VISIT (OUTPATIENT)
Dept: PSYCHIATRY | Facility: CLINIC | Age: 63
End: 2023-07-11
Payer: COMMERCIAL

## 2023-07-11 DIAGNOSIS — F33.1 MODERATE EPISODE OF RECURRENT MAJOR DEPRESSIVE DISORDER (H): Primary | ICD-10-CM
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
## 2023-07-19 ENCOUNTER — VIRTUAL VISIT (OUTPATIENT)
Dept: PSYCHIATRY | Facility: CLINIC | Age: 63
End: 2023-07-19
Payer: COMMERCIAL

## 2023-07-19 DIAGNOSIS — F33.1 MODERATE EPISODE OF RECURRENT MAJOR DEPRESSIVE DISORDER (H): Primary | ICD-10-CM

## 2023-07-19 ASSESSMENT — PATIENT HEALTH QUESTIONNAIRE - PHQ9: SUM OF ALL RESPONSES TO PHQ QUESTIONS 1-9: 9

## 2023-07-19 ASSESSMENT — PAIN SCALES - GENERAL: PAINLEVEL: NO PAIN (0)

## 2023-07-19 NOTE — NURSING NOTE
PHQ-9 score is 9.     Is the patient currently in the state of [place]? YES    Visit mode:VIDEO    If the visit is dropped, the patient can be reconnected by: VIDEO VISIT: Text to cell phone: [phone]    Will anyone else be joining the visit? NO    How would you like to obtain your AVS? Mail a copy    Are changes needed to the allergy or medication list? NO    Reason for visit: Consult    Medication and allergies have been reviewed.     Amilcar Em, VF

## 2023-07-19 NOTE — PROGRESS NOTES
Virtual Visit Details    Type of service:  Video Visit     Originating Location (pt. Location): Home    Distant Location (provider location):  Off-site  Platform used for Video Visit: [employer] Wonder     Depression Response    Patient completed the PHQ-9 assessment for depression and scored >9? Yes  Question 9 on the PHQ-9 was positive for suicidality? No  Does patient have current mental health provider? Yes    Is this a virtual visit? Yes   Does patient have suicidal ideation (positive question 9)? No - offer to place Mental Health Referral.  Patient declined referral/not needed    I personally notified the following: visit provider

## 2023-07-25 ENCOUNTER — VIRTUAL VISIT (OUTPATIENT)
Dept: PSYCHIATRY | Facility: CLINIC | Age: 63
End: 2023-07-25
Payer: COMMERCIAL

## 2023-07-25 DIAGNOSIS — F33.1 MAJOR DEPRESSIVE DISORDER, RECURRENT EPISODE, MODERATE (H): Primary | ICD-10-CM

## 2023-07-25 DIAGNOSIS — F41.1 GENERALIZED ANXIETY DISORDER: ICD-10-CM

## 2023-07-25 PROCEDURE — 99214 OFFICE O/P EST MOD 30 MIN: CPT | Mod: VID | Performed by: NURSE PRACTITIONER

## 2023-07-25 NOTE — PATIENT INSTRUCTIONS
**For crisis resources, please see the information at the end of this document**     Thank you for coming to the [facility] [place] [facility].    TREATMENT PLAN:  Medications:   DISCONTINUE aripiprazole 5 mg every day. (Patient stopped 1 week ago.)  CONTINUE pristiq 100 mg per PCP. No script needed.  CONTINUE bupropion  mg per PCP. No script needed.  Continue all other medications per primary care provider.     Follow Up:  No follow-up scheduled with CCPS at this time if [facility] clinic is taking over prescribing. If not, patient to schedule follow-up with this provider. Call [facility] at [phone] to schedule.  Follow up with primary care provider as planned or for acute medical concerns.  Call the psychiatric nurse line with medication questions or concerns at [phone].  [employer] may be used to communicate with your provider, but this is not intended to be used for emergencies.    Consults / Referrals:   Follow-up as planned with Interventional Psychiatry.        Financial Assistance [phone]  [facility] [phone]  Central Billing Office, ealth: [phone]  [facility] [phone]  Medical Records [phone]  [facility] Patient Bill of Rights [email or web address]       MENTAL HEALTH CRISIS RESOURCES:  For a emergency help, please call 911 or go to the nearest Emergency Department.     Emergency Walk-In Options:   EmPATH Unit @ [facility] ([place]): [phone] - Specialized mental health emergency area designed to be calming  [facility] ([place]): [phone]  [facility] ([place]): [phone]  [facility]): [phone]    [facility] Crisis Information:   [address]: [phone]  [place]: [phone]  [place] ([place]) - Adult: [phone]     Child: [phone]  [place] - Adult: [phone]     Child:  [phone]  [place]: [phone]  List of all [facility] resources:   https://[place].gov/dhs/people-we-serve/adults/health-care/mental-health/resources/crisis-contacts.jsp    National Crisis Information:   National Suicide & Crisis Lifeline: Call 988        For online chat options, visit https://suicidepreventionlifeline.org/chat/  Poison Control Center: 2-407-796-6058  Poison Control Center: 3-749-335-8208  Trans Lifeline: 4-318-421-3042 - Hotline for transgender people of all ages  The [employer]: 9-192-381-5587 - Hotline for LGBT youth     For Non-Emergency Support:   Fast Tracker: Mental Health & Substance Use Disorder Resources -   [email or web address].[email or web address]/       Again thank you for choosing [facility] [place] [facility] and please let us know how we can best partner with you to improve you and your family's health.    You may be receiving a survey regarding this appointment. We would love to have your feedback, both positive and negative. The survey is done by an external company, so your answers are anonymous.        Patient Education   Collaborative Care Psychiatry Service  What to Expect  Here's what to expect from your Collaborative Care Psychiatry Service (CCPS).   About CCPS  CCPS means 2 people work together to help you get better. You'll meet with a behavioral health clinician and a psychiatric doctor. A behavioral health clinician helps people with mental health problems by talking with them. A psychiatric doctor helps people by giving them medicine.  How it works  At every visit, you'll see the behavioral health clinician (BHC) first. They'll talk with you about how you're doing and teach you how to feel better.   Then you'll see the psychiatric doctor. This doctor can help you deal with troubling thoughts and feelings by giving you medicine. They'll make sure you know the plan for your care.   CCPS usually takes 3 to 6 visits. If you need more visits, we may have  "you start seeing a different psychiatric doctor for ongoing care.  If you have any questions or concerns, we'll be glad to talk with you.  About visits  Be open  At your visits, please talk openly about your problems. It may feel hard, but it's the best way for us to help you.  Cancelling visits  If you can't come to your visit, please call us right away at [phone]. If you don't cancel at least 24 hours (1 full day) before your visit, that's \"late cancellation.\"  Being late to visits  Being very late is the same as not showing up. You will be a \"no show\" if:  Your appointment starts with a [facility], and you're more than 15 minutes late for a 30-minute (half hour) visit. This will also cancel your appointment with the psychiatric doctor.  Your appointment is with a psychiatric doctor only, and you're more than 15 minutes late for a 30-minute (half hour) visit.  Your appointment is with a psychiatric doctor only, and you're more than 30 minutes late for a 60-minute (full hour) visit.  If you cancel late or don't show up 2 times within 6 months, we may end your care.   Getting help between visits  If you need help between visits, you can call us Monday to Friday from 8 a.m. to 4:30 p.m. at [phone].  Emergency care  Call 911 or go to the nearest emergency department if your life or someone else's life is in danger.  Call 988 anytime to reach the national Suicide and Crisis hotline.  Medicine refills  To refill your medicine, call your pharmacy. You can also call [facility]'s [facility] at [phone], Monday to Friday, 8 a.m. to 4:30 p.m. It can take 1 to 3 business days to get a refill.   Forms, letters, and tests  You may have papers to fill out, like FMLA, short-term disability, and workability. We can help you with these forms at your visits, but you must have an appointment. You may need more than 1 visit for this, to be in an intensive therapy program, or both.  Before we can give you " medicine for ADHD, we may refer you to get tested for it or confirm it another way.  We may not be able to give you an emotional support animal letter.  We don't do mental health checks ordered by the court.   We don't do mental health testing, but we can refer you to get tested.   Thank you for choosing us for your care.  For informational purposes only. Not to replace the advice of your health care provider. Copyright   2022 [facility]. All rights reserved. [employer] [id number] - 12/22.

## 2023-07-25 NOTE — Clinical Note
Devang Crawley, Hoping to check in re: this patient who just started with you. We have been working together briefly in the Collaborative Care Psychiatry Service (CCPS) service. Our psychiatry providers act as a specialty service for PCPs in the [facility] who seek to optimize medications for unstable patients.  Once medications have been optimized, our providers discharge the patient back to the referring PCP for ongoing management. He came to me most interested in ketamine or non-medication treatment options so I placed the referral. As he has started with you all, I wanted to clarify if medications will be taken over and any changes will be managed through your clinic at this time.   Additionally, he let me know today he was able to get his list of past medication trials from last psychiatrist but is unsure where to send it to you.   RABIA Anguiano, PMHNP-BC Collaborative Care Psychiatry Service (CCPS) [facility]
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
## 2023-07-25 NOTE — NURSING NOTE
Is the patient currently in the state of [place]? YES    Visit mode:VIDEO    If the visit is dropped, the patient can be reconnected by: VIDEO VISIT: Text to cell phone: [phone]    Will anyone else be joining the visit? NO      How would you like to obtain your AVS? MyChart    Are changes needed to the allergy or medication list? NO    Reason for visit: RECHECK

## 2023-07-28 ENCOUNTER — OFFICE VISIT (OUTPATIENT)
Dept: UROLOGY | Facility: CLINIC | Age: 63
End: 2023-07-28
Payer: COMMERCIAL

## 2023-07-28 VITALS — OXYGEN SATURATION: 97 % | HEART RATE: 68 BPM | DIASTOLIC BLOOD PRESSURE: 94 MMHG | SYSTOLIC BLOOD PRESSURE: 149 MMHG

## 2023-07-28 DIAGNOSIS — N40.1 BENIGN PROSTATIC HYPERPLASIA WITH LOWER URINARY TRACT SYMPTOMS, SYMPTOM DETAILS UNSPECIFIED: Primary | ICD-10-CM

## 2023-07-28 DIAGNOSIS — R39.15 URINARY URGENCY: ICD-10-CM

## 2023-07-28 DIAGNOSIS — E11.42 TYPE 2 DIABETES MELLITUS WITH DIABETIC POLYNEUROPATHY, WITHOUT LONG-TERM CURRENT USE OF INSULIN (H): ICD-10-CM

## 2023-07-28 DIAGNOSIS — I10 HYPERTENSION GOAL BP (BLOOD PRESSURE) < 140/90: ICD-10-CM

## 2023-07-28 PROCEDURE — 99204 OFFICE O/P NEW MOD 45 MIN: CPT | Mod: 25 | Performed by: UROLOGY

## 2023-07-28 PROCEDURE — 51798 US URINE CAPACITY MEASURE: CPT | Performed by: UROLOGY

## 2023-07-28 RX ORDER — TOLTERODINE 4 MG/1
4 CAPSULE, EXTENDED RELEASE ORAL DAILY
Qty: 90 CAPSULE | Refills: 3 | Status: SHIPPED | OUTPATIENT
Start: 2023-07-28 | End: 2024-08-13

## 2023-07-28 NOTE — PROGRESS NOTES
Bladder Scan performed. 0ml maximum residual urine detected after 3 scans. MARIA EELNA Rojas RN 7/28/2023 11:52 AM

## 2023-07-28 NOTE — PROGRESS NOTES
S: Christopher Rodriguez is a pleasant  63 year old male who was requested to be seen by  Kristi Muñoz for a consult with regard to patient's urinary complaints.  Patient complains of Urgency and slower urinary stream.  He has no history of elevated PSA.  Symptoms have been on going for   several month(s).  Seems to be worsened over time.  His recent PSA was found to be   Prostate Specific Antigen Screen   Date Value Ref Range Status   04/07/2023 0.66 0.00 - 4.00 ug/L Final   .  His AUA Symptom Score:  5.  He takes flomax every other day.  He has DM which has not been under controlled.  He has no dysuria or hematuria.  Recent UA was negative except for glucose.    Current Outpatient Medications   Medication Sig Dispense Refill    atorvastatin (LIPITOR) 40 MG tablet Take 1 tablet (40 mg) by mouth every morning 90 tablet 3    buPROPion (WELLBUTRIN XL) 300 MG 24 hr tablet Take 1 tablet (300 mg) by mouth every morning 90 tablet 1    calcipotriene (DOVONOX) 0.005 % external cream Apply twice daily to the crown of the scalp for 4 days. Can extend up to 7 days until red and irritated. 60 g 0    desvenlafaxine (PRISTIQ) 100 MG 24 hr tablet Take 1 tablet (100 mg) by mouth daily 90 tablet 1    Dulaglutide 3 MG/0.5ML SOPN Inject 3 mg Subcutaneous every 7 days 6 mL 1    fluorouracil (EFUDEX) 5 % external cream Apply twice daily to the crown of the scalp for 4 days. Can extend up to 7 days until red and irritated. 49 g 0    glyBURIDE (DIABETA /MICRONASE) 5 MG tablet Take 2 tablets (10 mg) by mouth daily (with breakfast) 180 tablet 1    losartan (COZAAR) 100 MG tablet Take 1 tablet (100 mg) by mouth daily 90 tablet 3    metFORMIN (GLUCOPHAGE) 1000 MG tablet Take 1 tablet (1,000 mg) by mouth 2 times daily (with meals) 180 tablet 1    tadalafil (CIALIS) 10 MG tablet Take 1 tablet (10 mg) by mouth daily as needed (erectily dysfunction. Take 30-60 min before sexual activity.) 10 tablet 3    tolterodine ER (DETROL LA) 4 MG 24 hr capsule Take  1 capsule (4 mg) by mouth daily 90 capsule 3     No Known Allergies  Past Medical History:   Diagnosis Date    Depressive disorder     Diabetes (H)     Hyperlipidemia     Hypertension      Past Surgical History:   Procedure Laterality Date    ARTHROSCOPY KNEE Left      REPAIR CLEFT PALATE        Family History   Family history unknown: Yes     He does not have a family history of prostate cancer.  Social History     Socioeconomic History    Marital status: Single   Occupational History    Occupation: Self Employeed - House flipping   Tobacco Use    Smoking status: Never    Smokeless tobacco: Never   Vaping Use    Vaping Use: Never used   Substance and Sexual Activity    Alcohol use: Yes     Comment: sometimes     Drug use: Never    Sexual activity: Not Currently        REVIEW OF SYSTEMS  =================  C: NEGATIVE for fever, chills, change in weight  I: NEGATIVE for worrisome rashes, moles or lesions  E/M: NEGATIVE for ear, mouth and throat problems  R: NEGATIVE for significant cough or SHORTNESS OF BREATH  CV:  NEGATIVE for chest pain, palpitations or peripheral edema  GI: NEGATIVE for nausea, abdominal pain, heartburn, or change in bowel habits  NEURO: NEGATIVE numbness/weakness  : see HPI  PSYCH: NEGATIVE depression/anxiety  LYmph: no new enlarged lymph nodes  Ortho: no new trauma/movements           O: Exam:BP (!) 149/94   Pulse 68   SpO2 97%    Constitutional: healthy, alert and no distress  Cardiovascular: negative, PMI normal.   Respiratory: negative, no evidence of respiratory distress  Gastrointestinal: Abdomen soft, non-tender. BS normal. No masses, organomegaly  : penis no discharge.  Testis no masses.  No scrotal skin lesion.  Prostate 30 gm smooth.  PVR zero ml.  Musculoskeletal: extremities normal- no gross deformities noted, gait normal and normal muscle tone  Skin: no suspicious lesions or rashes  Neurologic: Alert and oriented  Psychiatric: mentation appears normal. and affect  normal/bright  Hematologic/Lymphatic/Immunologic: normal ant/post cervical, axillary, supraclavicular and inguinal nodes    Assessment/Plan:   (N40.1) Benign prostatic hyperplasia with lower urinary tract symptoms, symptom details unspecified  (primary encounter diagnosis)  Comment:    Plan: cont flomax    (R39.15) Urinary urgency  Comment: most likely associated with poorly control DM  Plan: detrol prn           Once DM is under control, it can be discontinued if symptoms resolve.    (E11.42) Type 2 diabetes mellitus with diabetic polyneuropathy, without long-term current use of insulin (H)  Comment:  discussed relationship between LUTS and DM  Plan: per primary    (I10) Hypertension goal BP (blood pressure) < 140/90  Comment:    Plan: Christopher to follow up with Primary Care provider regarding elevated blood pressure.

## 2023-07-28 NOTE — PROGRESS NOTES
Christopher Rodriguez was evaluated in a specialty clinic today at which time his blood pressure was noted to be elevated.  He  has been advised to follow up with his primary provider or with a nurse only visit. We are also routing this message to his primary provider as an FYI.     MARIA ELENA Barrow RN 7/28/2023 11:45 AM
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
## 2023-08-11 ENCOUNTER — OFFICE VISIT (OUTPATIENT)
Dept: PSYCHIATRY | Facility: CLINIC | Age: 63
End: 2023-08-11
Attending: NURSE PRACTITIONER
Payer: COMMERCIAL

## 2023-08-11 VITALS
HEART RATE: 35 BPM | SYSTOLIC BLOOD PRESSURE: 132 MMHG | DIASTOLIC BLOOD PRESSURE: 72 MMHG | HEIGHT: 70 IN | WEIGHT: 249.8 LBS | TEMPERATURE: 96.8 F | BODY MASS INDEX: 35.76 KG/M2

## 2023-08-11 DIAGNOSIS — F33.1 MAJOR DEPRESSIVE DISORDER, RECURRENT EPISODE, MODERATE (H): Primary | ICD-10-CM

## 2023-08-11 ASSESSMENT — PATIENT HEALTH QUESTIONNAIRE - PHQ9: SUM OF ALL RESPONSES TO PHQ QUESTIONS 1-9: 7

## 2023-08-11 NOTE — PATIENT INSTRUCTIONS
"Today's Recommendations:  - As we talked about, I am concerned that you might have a neurologic problem that is causing your fatigue and apathy, and I want to work on figuring out whether this is true. I have written to your PCP and psychiatric prescriber about ideas, and I will order a brain scan.  - I specifically really want you to contact your PCP about this irregular heartbeat, you really ought to have an EKG, and if that's normal, I would ask about a wearable heart monitor.  - Other things I have recommended include better treatment of your sleep apnea and consideration of a dopamine-promoting medication (would come from [facility]).  - We also think you might benefit from a speciifc therapy called \"behavioral activation\", which is basically a structured method of scheduling pleasurable activities to try to increase your engagement and thus motivation.   You were a bit hesitant about this today, but it remains a recommendation.  - Come back and see me in 2-3 months; I want to review our testing and see if there is more we need to consider.        We are specifically a university and research clinic, and there are often research studies ongoing. Some of those are clinical trials of new brain stimulation treatments. Others are what we would call \"biomarker\" studies, where we ask you to participate in some kind of measurement while you are undergoing regular treatment.   If you are interested in hearing about research consider signing up for our research registry. This will allow researchers in our clinic to reach out if you become eligible for a study. Sign up today at [email or web address]    In some cases, a clinical trial is the only way to get access to an advanced treatment that is not covered by your insurance. Usually, we will talk about this in your visit if it is an option.      Patient Education       General Information:  Our Treatment-Resistant Disorders/Interventional Psychiatry clinic " "is what is often called a \"consultation\" or \"tertiary care\" clinic. That means we do not see patients long-term for medication management or talk therapy. We offer thorough evaluations, recommendations about medications/therapies you may have not yet tried, and in some cases, brain stimulation or office-based treatments. If you are likely to benefit from one of those advanced treatments, we will have talked about it today. Once that treatment is complete, we will see you once or twice afterwards to check in, and then you will return to getting ongoing psychiatric care from whomever you were seeing before you came for your evaluation with us. If for some reason you no longer have access to that clinician, we can help with a referral to our main [facility]. The only patients we see long-term are patients with implanted medical devices that require ongoing monitoring and programming.     Our recommendations almost always include some kind of cognitive-behavioral therapy (CBT) if you are not getting it already. Brain and nerve stimulation treatments work best when combined with certain talk therapies. We make these recommendations because we strongly believe that, without the therapy piece, most people will not get better, or will have limited clinical benefit. It is often difficult or inconvenient to add therapy to an already busy schedule, but it is also necessary.    It is important to remember that, like all mental health treatments, our interventional therapies are not perfect. About one third of people will not feel better after treatment, and even when they work, they do not take away the symptoms entirely.If we have recommended something above, it means we think there is a better than even chance of it working, but things are never guaranteed. This is another reason we usually recommend CBT along with our advanced treatments -- it can address the symptoms that remain after the stimulation/ketamine " treatment.       While we are waiting to implement the recommendations you and I have discussed, you should know what to do if your symptoms worsen. A variety of crisis numbers/resources are available. They include:    CRISIS GENERAL NUMBERS   8-648-THRZFVJ (1-279.453.9439)  OR  911     CRISIS INTERVENTION TEAM (CIT) - this is a POLICE UNIT, specially trained.  This website has information for all of [place]'s CITs. Lays out which areas have this team.  [email or web address]  However, one can just call 911 and ask for this special team.   If police need to be called, DO ask for this team.    CRISIS MOBILE TEAMS  [and see end of this phrase*]  [facility] -COPE: 24hrs/7days:    [phone]  (Adults > 17yo)    [phone]  (Child   < 16yo)                                       FRONT DOOR (during the day could call)   [phone]    [facility] -[phone] (Adult)  [phone] (Child)     [facility] -[phone] (Adult and Child)     [facility] -[phone] ([facility] mobile crisis team; Adult and Child; 24hr)     [facility] -[phone] (Adult and Child)     CRISIS HOUSING  [facility]                           [address]              [phone]  [facility]                                [address]                       [phone]  [facility]                               [address]     [phone]   [facility]  [address]                [phone]    [facility] EMERGENCY NUMBERS    Crisis Connection:                                [phone]  [facility]:     [phone]  Crisis Intervention:                                [phone] or [phone]   COPE: Mobile Team 24hrs/7days:    [phone]  (Adults > 17yo)                                               "                              [phone]  (Child   < 18yo)  [facility]        [phone] 24/7 line and Mobile Team   [address] [place] [address]  DROP IN:  M-F: 8:00 am - 7:00 pm  Sat: 11:00 am - 3:00 pm   Sun: Closed     WALK-IN COUNSELING:  [facility]       [phone]    [address]:   M, W, F:  1:00-3:00PM    M-Th:  6:30-8:30PM    TRANS and LGBT  Call [facility] at [phone]. This service is staffed by trans people 24/7.   LGBT youth <24 contemplating suicide, call the [employer] [phone].    POISON CONTROL CENTER  [phone]    OR  go to nearest ER    CHILD  \"[facility] has a needs assessment team who will do an intake evaluation. Based on the results of the intake they will recommended inpatient admission, partial hospitalization, intensive outpatient or outpatient care. The number is [phone]. \"    CRISIS TEXT LINE  Http://www.crisistextline.org  FREE SUPPORT AT YOUR FINGERTIPS, 24/7  Crisis Text Line serves anyone, in any type of crisis, providing access to free, 24/7 support and information via the medium people already use and trust: text. Here s how it works:  1)  Text [phone] from anywhere in the [place], anytime, about any type of crisis.  2)  A live, trained Crisis Counselor receives the text and responds quickly.      The volunteer Crisis Counselor will help you move from a 'hot moment to a cool moment'    [facility] EMERGENCY NUMBERS    Crisis Connection:                                [phone]  [facility]:              [phone]  Crisis Intervention:                                [phone] or [phone]   COPE: Mobile Team 24hrs/7days:    [phone]  (Adults > 19yo)                                                                            [phone]  (Child   < 18yo)  [facility]        [phone]  CALL 24 hours a day.  [address] [address]  DROP IN:  M-F: 8:00 am - 7:00 pm  Sat: 11:00 am - 3:00 pm   Sun: Closed    WALK-IN COUNSELIN)  [facility]                                  [phone]        [place], [address]:                  LIZEBTH, W:      5:00-7:00PM       2)  [facility]                            [phone]        [place], [address]                T, Th:      6:00-8:00PM    TRANS and LGBT  Call [employer] at [phone]. This service is staffed by trans people .   LGBT youth <24 contemplating suicide, call the [employer] [phone].    POISON CONTROL CENTER  [phone]    OR  go to nearest ER    CHILD  \"[facility] has a needs assessment team who will do an intake evaluation. Based on the results of the intake they will recommended inpatient admission, partial hospitalization, intensive outpatient or outpatient care. The number is [phone] or [phone]. \"    CRISIS TEXT LINE  Http://www.crisistextline.org  FREE SUPPORT AT YOUR FINGERTIPS,   Crisis Text Line serves anyone, in any type of crisis, providing access to free,  support and information via the medium people already use and trust: text. Here s how it works:  1)  Text [phone] from anywhere in the [place], anytime, about any type of crisis.  2)  A live, trained Crisis Counselor receives the text and responds quickly.      The volunteer Crisis Counselor will help you move from a 'hot moment to a cool moment'      * A Community Paramedic (CP) is an advanced paramedic that works to increase access to primary and preventive care and decrease use of emergency departments, which in turn decreases health care costs. Among other things, CPs may play a key role in providing follow-up services after a hospital discharge to prevent hospital readmission. CPs can provide health assessments, chronic disease monitoring and education, medication management, immunizations and " vaccinations, laboratory specimen collection, hospital discharge follow-up care and minor medical procedures. CPs work under the direction of an Ambulance Medical Director.

## 2023-09-02 DIAGNOSIS — R39.198 SLOW URINARY STREAM: ICD-10-CM

## 2023-09-02 DIAGNOSIS — R39.15 URINARY URGENCY: ICD-10-CM

## 2023-09-02 DIAGNOSIS — E11.42 TYPE 2 DIABETES MELLITUS WITH DIABETIC POLYNEUROPATHY, WITHOUT LONG-TERM CURRENT USE OF INSULIN (H): ICD-10-CM

## 2023-09-05 RX ORDER — GLYBURIDE 5 MG/1
10 TABLET ORAL
Qty: 60 TABLET | Refills: 0 | OUTPATIENT
Start: 2023-09-05

## 2023-09-05 RX ORDER — TAMSULOSIN HYDROCHLORIDE 0.4 MG/1
0.4 CAPSULE ORAL DAILY
Qty: 30 CAPSULE | Refills: 1 | OUTPATIENT
Start: 2023-09-05

## 2023-09-15 ENCOUNTER — ANCILLARY PROCEDURE (OUTPATIENT)
Dept: MRI IMAGING | Facility: CLINIC | Age: 63
End: 2023-09-15
Attending: PSYCHIATRY & NEUROLOGY
Payer: COMMERCIAL

## 2023-09-15 DIAGNOSIS — F33.1 MAJOR DEPRESSIVE DISORDER, RECURRENT EPISODE, MODERATE (H): ICD-10-CM

## 2023-09-15 PROCEDURE — 70553 MRI BRAIN STEM W/O & W/DYE: CPT | Mod: GC | Performed by: RADIOLOGY

## 2023-09-15 PROCEDURE — A9585 GADOBUTROL INJECTION: HCPCS | Mod: JZ | Performed by: RADIOLOGY

## 2023-09-15 RX ORDER — GADOBUTROL 604.72 MG/ML
15 INJECTION INTRAVENOUS ONCE
Status: COMPLETED | OUTPATIENT
Start: 2023-09-15 | End: 2023-09-15

## 2023-09-15 RX ADMIN — GADOBUTROL 11.5 ML: 604.72 INJECTION INTRAVENOUS at 10:59

## 2023-10-12 DIAGNOSIS — F32.4 MAJOR DEPRESSIVE DISORDER WITH SINGLE EPISODE, IN PARTIAL REMISSION (H): ICD-10-CM

## 2023-10-12 DIAGNOSIS — I10 HYPERTENSION GOAL BP (BLOOD PRESSURE) < 140/90: ICD-10-CM

## 2023-10-12 DIAGNOSIS — E11.42 TYPE 2 DIABETES MELLITUS WITH DIABETIC POLYNEUROPATHY, WITHOUT LONG-TERM CURRENT USE OF INSULIN (H): ICD-10-CM

## 2023-10-12 DIAGNOSIS — E78.5 HYPERLIPIDEMIA LDL GOAL <100: ICD-10-CM

## 2023-10-12 DIAGNOSIS — R39.15 URINARY URGENCY: ICD-10-CM

## 2023-10-12 RX ORDER — LOSARTAN POTASSIUM 100 MG/1
100 TABLET ORAL DAILY
Qty: 90 TABLET | Refills: 3 | OUTPATIENT
Start: 2023-10-12

## 2023-10-12 RX ORDER — TOLTERODINE 4 MG/1
4 CAPSULE, EXTENDED RELEASE ORAL DAILY
Qty: 90 CAPSULE | Refills: 3 | Status: CANCELLED | OUTPATIENT
Start: 2023-10-12

## 2023-10-12 RX ORDER — GLYBURIDE 5 MG/1
10 TABLET ORAL
Qty: 180 TABLET | Refills: 1 | OUTPATIENT
Start: 2023-10-12

## 2023-10-12 RX ORDER — ATORVASTATIN CALCIUM 40 MG/1
40 TABLET, FILM COATED ORAL EVERY MORNING
Qty: 90 TABLET | Refills: 3 | OUTPATIENT
Start: 2023-10-12

## 2023-10-12 NOTE — TELEPHONE ENCOUNTER
Patient calling for refills. Stated he needs to go back to [facility] as his provider no longer is with [facility]. Mentioned the 7 pended medications and his preferred pharmacy. Stated he used his last Metformin today.    Routing to refill pool to address refills.    Quentin Knapp RN

## 2023-10-12 NOTE — LETTER
October 13, 2023        Christopher Rodriguez  [address]  [place] [address]        Dear Christopher Rodriguez,      At [facility] we care about your health and are committed to providing quality patient care. Regular appointments are a vital part of the care and management of your health and can help prevent many of the complications that can occur.      It has come to our attention that you are due for Follow up appointment.  Please call [facility] at [phone] soon to schedule your follow up appointment.    If you have transferred care to another clinic please call to inform us so that we do not continue to send you reminder letters.      Sincerely,      [facility] Care Team

## 2023-10-13 RX ORDER — BUPROPION HYDROCHLORIDE 300 MG/1
300 TABLET ORAL EVERY MORNING
Qty: 90 TABLET | Refills: 0 | Status: SHIPPED | OUTPATIENT
Start: 2023-10-13 | End: 2024-01-16

## 2023-10-13 RX ORDER — DESVENLAFAXINE 100 MG/1
100 TABLET, EXTENDED RELEASE ORAL DAILY
Qty: 90 TABLET | Refills: 0 | Status: SHIPPED | OUTPATIENT
Start: 2023-10-13 | End: 2024-01-16

## 2023-10-13 NOTE — TELEPHONE ENCOUNTER
One refill sent. Patient needs to establish with a new provider for further refills.  He should consider seeing one of the new providers as I think I am booked until Feb.    He should have fills for the tolterodine.    Su Stubbs MD

## 2023-10-22 ENCOUNTER — TELEPHONE (OUTPATIENT)
Dept: FAMILY MEDICINE | Facility: CLINIC | Age: 63
End: 2023-10-22
Payer: COMMERCIAL

## 2023-10-22 DIAGNOSIS — E11.42 TYPE 2 DIABETES MELLITUS WITH DIABETIC POLYNEUROPATHY, WITHOUT LONG-TERM CURRENT USE OF INSULIN (H): ICD-10-CM

## 2023-10-22 NOTE — TELEPHONE ENCOUNTER
Aripiprazole 5 mg tabs  #30  Sig: Take one half tablet by mouth every day for 7 days, then take one tablet by mouth every day

## 2023-10-23 RX ORDER — GLYBURIDE 5 MG/1
10 TABLET ORAL
Qty: 180 TABLET | Refills: 1 | OUTPATIENT
Start: 2023-10-23

## 2023-10-25 ENCOUNTER — VIRTUAL VISIT (OUTPATIENT)
Dept: FAMILY MEDICINE | Facility: CLINIC | Age: 63
End: 2023-10-25
Payer: COMMERCIAL

## 2023-10-25 DIAGNOSIS — R39.198 SLOW URINARY STREAM: ICD-10-CM

## 2023-10-25 DIAGNOSIS — E11.42 TYPE 2 DIABETES MELLITUS WITH DIABETIC POLYNEUROPATHY, WITHOUT LONG-TERM CURRENT USE OF INSULIN (H): ICD-10-CM

## 2023-10-25 DIAGNOSIS — R39.15 URINARY URGENCY: ICD-10-CM

## 2023-10-25 PROCEDURE — 99214 OFFICE O/P EST MOD 30 MIN: CPT | Mod: 95 | Performed by: FAMILY MEDICINE

## 2023-10-25 RX ORDER — GLYBURIDE 5 MG/1
10 TABLET ORAL 2 TIMES DAILY WITH MEALS
Qty: 120 TABLET | Refills: 0 | Status: SHIPPED | OUTPATIENT
Start: 2023-10-25 | End: 2023-12-07

## 2023-10-25 RX ORDER — TAMSULOSIN HYDROCHLORIDE 0.4 MG/1
0.4 CAPSULE ORAL DAILY
Qty: 90 CAPSULE | Refills: 1 | Status: SHIPPED | OUTPATIENT
Start: 2023-10-25 | End: 2024-04-18

## 2023-10-25 ASSESSMENT — PATIENT HEALTH QUESTIONNAIRE - PHQ9
10. IF YOU CHECKED OFF ANY PROBLEMS, HOW DIFFICULT HAVE THESE PROBLEMS MADE IT FOR YOU TO DO YOUR WORK, TAKE CARE OF THINGS AT HOME, OR GET ALONG WITH OTHER PEOPLE: SOMEWHAT DIFFICULT
SUM OF ALL RESPONSES TO PHQ QUESTIONS 1-9: 9
SUM OF ALL RESPONSES TO PHQ QUESTIONS 1-9: 9

## 2023-10-25 NOTE — PROGRESS NOTES
Christopher is a 63 year old who is being evaluated via a billable telephone visit.    .virtual  What phone number would you like to be contacted at? [phone]  How would you like to obtain your AVS? Mail a copy    D[place] Location (provider location):  On-site    Assessment & Plan     Type 2 diabetes mellitus with diabetic polyneuropathy, without long-term current use of insulin (H)  Uncertain control - check labs with further refills/medication changes as indicated  - Dulaglutide 3 MG/0.5ML SOPN; Inject 3 mg Subcutaneous every 7 days  - glyBURIDE (DIABETA /MICRONASE) 5 MG tablet; Take 2 tablets (10 mg) by mouth 2 times daily (with meals)    Urinary urgency  Restart flomax as per urology note.  - tamsulosin (FLOMAX) 0.4 MG capsule; Take 1 capsule (0.4 mg) by mouth daily    Slow urinary stream  As above  - tamsulosin (FLOMAX) 0.4 MG capsule; Take 1 capsule (0.4 mg) by mouth daily  The uses and side effects, including black box warnings as appropriate, were discussed in detail.  All patient questions were answered.  The patient was instructed to call immediately if any side effects developed.     Su Stubbs MD  [facility]    Chan White is a 63 year old, presenting for the following health issues:  Recheck Medication, Hypertension, Diabetes, and Lipids        10/25/2023     2:38 PM   Additional Questions   Roomed by Homar BOYD       History of Present Illness       Diabetes:   He presents for follow up of diabetes.  He is checking home blood glucose a few times a month.   He checks blood glucose before meals.  Blood glucose is never over 200 and never under 70. He is aware of hypoglycemia symptoms including shakiness, weakness and other.    He has no concerns regarding his diabetes at this time.  He is having numbness in feet.            Hyperlipidemia:  He presents for follow up of hyperlipidemia.   He is taking medication to lower cholesterol. He is not having myalgia  or other side effects to statin medications.    Hypertension: He presents for follow up of hypertension.  He does not check blood pressure  regularly outside of the clinic. Outpatient blood pressures have not been over 140/90. He does not follow a low salt diet.     He eats 2-3 servings of fruits and vegetables daily.He consumes 1 sweetened beverage(s) daily.He exercises with enough effort to increase his heart rate 30 to 60 minutes per day.  He exercises with enough effort to increase his heart rate 5 days per week.   He is taking medications regularly.               Review of Systems   Constitutional, HEENT, cardiovascular, pulmonary, gi and gu systems are negative, except as otherwise noted.      Objective           Vitals:  No vitals were obtained today due to virtual visit.    Physical Exam   healthy, alert, and no distress  PSYCH: Alert and oriented times 3; coherent speech, normal   rate and volume, able to articulate logical thoughts, able   to abstract reason, no tangential thoughts, no hallucinations   or delusions  His affect is normal  RESP: No cough, no audible wheezing, able to talk in full sentences  Remainder of exam unable to be completed due to telephone visits            Phone call duration: 15 minutes
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
## 2023-11-17 ENCOUNTER — OFFICE VISIT (OUTPATIENT)
Dept: PSYCHIATRY | Facility: CLINIC | Age: 63
End: 2023-11-17
Payer: COMMERCIAL

## 2023-11-17 VITALS
HEART RATE: 80 BPM | BODY MASS INDEX: 35.65 KG/M2 | SYSTOLIC BLOOD PRESSURE: 143 MMHG | WEIGHT: 249 LBS | DIASTOLIC BLOOD PRESSURE: 90 MMHG | TEMPERATURE: 97.5 F | HEIGHT: 70 IN

## 2023-11-17 DIAGNOSIS — F33.1 MODERATE EPISODE OF RECURRENT MAJOR DEPRESSIVE DISORDER (H): Primary | ICD-10-CM

## 2023-11-17 ASSESSMENT — PATIENT HEALTH QUESTIONNAIRE - PHQ9: SUM OF ALL RESPONSES TO PHQ QUESTIONS 1-9: 9

## 2023-11-17 NOTE — PATIENT INSTRUCTIONS
Today's Recommendations:  (1) We discussed trying a medication called pramipexole (brand name Mirapex). It is originally developed for Parkinson's disease, but it has activity on the brain's dopamine circuits, which are involved in our sense of reward and motivation. In your case, because problems in motivation and pleasure are a large part of your symptoms, I think it could be helpful.   In our experience, maybe 1/4 of patients who try pramipexole have some benefit.   The thing to know is that some patients (maybe 10%, as a guess) of patients who take this medication will become impulsive -- spending more, gambling, being more sexual, etc. If you do start it, warn a friend or loved one that your behavior may change, so they can keep an eye on you.  There are other side effects (insomnia, some folks report nausea, some report jitteriness), but they tend to be much milder.    (2) We discussed pursuing TMS as a next step.  We think this would be the next step for you.  It will take some time to get an authorization from insurance, but we will start this process.  A bit more information about this treatment:  As the first step, we would recommend starting with rTMS:              -- This treatment course would involve 10-30 minute sessions, 5 days per week for 6 weeks              -- These treatments would be administered at the [facility] where you underwent your initial evaluation  - Specific risks/benefits of rTMS treatment include the common side effects (headache, mild anxiety, visual effects, dizziness) and the extremely rare possibility of a seizure.  - Contrary to what some believe, memory loss is NOT a TMS side effect, but rather an Electroconvulsive therapy (ECT) side effect  - Here is a TMS YouTube video that can be informative: https://www.youTwoodoube.com/watch?v=[email or web address]&[email or web address]     (3) We will reach out to your primary care doctor regarding some medical workup to make sure other medical problems  "aren't contributing to your depression.    (4) At some point in the future, consider following up with a neurologist to discuss balance difficulties and tremor, rule out Parkinsons.    --    We are specifically a university and research clinic, and there are often research studies ongoing. Some of those are clinical trials of new brain stimulation treatments. Others are what we would call \"biomarker\" studies, where we ask you to participate in some kind of measurement while you are undergoing regular treatment.   If you are interested in hearing about research consider signing up for our research registry. This will allow researchers in our clinic to reach out if you become eligible for a study. Sign up today at [email or web address]    In some cases, a clinical trial is the only way to get access to an advanced treatment that is not covered by your insurance. Usually, we will talk about this in your visit if it is an option.      Patient Education       General Information:  Our Treatment-Resistant Disorders/Interventional Psychiatry clinic is what is often called a \"consultation\" or \"tertiary care\" clinic. That means we do not see patients long-term for medication management or talk therapy. We offer thorough evaluations, recommendations about medications/therapies you may have not yet tried, and in some cases, brain stimulation or office-based treatments. If you are likely to benefit from one of those advanced treatments, we will have talked about it today. Once that treatment is complete, we will see you once or twice afterwards to check in, and then you will return to getting ongoing psychiatric care from whomever you were seeing before you came for your evaluation with us. If for some reason you no longer have access to that clinician, we can help with a referral to our main [facility]. The only patients we see long-term are patients with implanted medical devices that require ongoing " monitoring and programming.     Our recommendations almost always include some kind of cognitive-behavioral therapy (CBT) if you are not getting it already. Brain and nerve stimulation treatments work best when combined with certain talk therapies. We make these recommendations because we strongly believe that, without the therapy piece, most people will not get better, or will have limited clinical benefit. It is often difficult or inconvenient to add therapy to an already busy schedule, but it is also necessary.    It is important to remember that, like all mental health treatments, our interventional therapies are not perfect. About one third of people will not feel better after treatment, and even when they work, they do not take away the symptoms entirely.If we have recommended something above, it means we think there is a better than even chance of it working, but things are never guaranteed. This is another reason we usually recommend CBT along with our advanced treatments -- it can address the symptoms that remain after the stimulation/ketamine treatment.       While we are waiting to implement the recommendations you and I have discussed, you should know what to do if your symptoms worsen. A variety of crisis numbers/resources are available. They include:    CRISIS GENERAL NUMBERS   7-137-WLVAVEV ([phone])  OR  911     CRISIS INTERVENTION TEAM (CIT) - this is a POLICE UNIT, specially trained.  This website has information for all of [place]'s CITs. Lays out which areas have this team.  [email or web address]  However, one can just call 911 and ask for this special team.   If police need to be called, DO ask for this team.    CRISIS MOBILE TEAMS  [and see end of this phrase*]  [facility]: 24hrs/7days:    [phone]  (Adults > 19yo)    [phone]  (Child   < 18yo)                                       FRONT DOOR (during the day could call)   [phone]    [place]  "[facility] -[phone] (Adult)  [phone] (Child)     [facility] -[phone] (Adult and Child)     [facility] -[phone] ([place] mobile crisis team; Adult and Child; 24hr)     [facility] -[phone] (Adult and Child)     CRISIS HOUSING  [facility]                           [address]              [phone]  [facility]                                [address]                       [phone]  [facility]                               [address] [facility] [address]     [phone]   [facility]  [address]                [phone]    [place] EMERGENCY NUMBERS    Crisis Connection:                                [phone]  [facility]:     [phone]  Crisis Intervention:                                [phone] or [phone]   COPE: Mobile Team 24hrs/7days:    [phone]  (Adults > 19yo)                                                                            [phone]  (Child   < 16yo)  [facility]        [phone] 24/7 line and Mobile Team   [address] [place] [address]  DROP IN:  M-F: 8:00 am - 7:00 pm  Sat: 11:00 am - 3:00 pm   Sun: Closed     WALK-IN COUNSELING:  [facility]       [phone]    [address]:   M, W, F:  1:00-3:00PM    M-Th:  6:30-8:30PM    TRANS and LGBT  Call [employer] at [phone]. This service is staffed by trans people 24/7.   LGBT youth <24 contemplating suicide, call the [facility] [phone].    POISON CONTROL CENTER  [phone]    OR  go to nearest ER    CHILD  \"[facility] has a needs assessment team who will do an intake evaluation. Based on the results of the intake they will recommended inpatient admission, partial hospitalization, intensive " "outpatient or outpatient care. The number is [phone]. \"    CRISIS TEXT LINE  Http://www.crisistextline.org  FREE SUPPORT AT YOUR FINGERTIPS,   Crisis Text Line serves anyone, in any type of crisis, providing access to free,  support and information via the medium people already use and trust: text. Here s how it works:  1)  Text 232736 from anywhere in the [place], anytime, about any type of crisis.  2)  A live, trained Crisis Counselor receives the text and responds quickly.      The volunteer Crisis Counselor will help you move from a 'hot moment to a cool moment'    [facility] EMERGENCY NUMBERS    Crisis Connection:                                [phone]  [facility]:              [phone]  Crisis Intervention:                                [phone] or [phone]   COPE: Mobile Team 24hrs/7days:    [phone]  (Adults > 19yo)                                                                            [phone]  (Child   < 16yo)  [facility]        [phone]  CALL 24 hours a day.  [address] [place] [address]  DROP IN:  M-F: 8:00 am - 7:00 pm  Sat: 11:00 am - 3:00 pm   Sun: Closed    WALK-IN COUNSELIN)  [facility]                                  [phone]        [address]:                  M, W:      5:00-7:00PM       2)  [facility]                            [phone]        [place] [address] [facility] [place] [facility]                T, Th:      6:00-8:00PM    TRANS and LGBT  Call [employer] at [phone]. This service is staffed by trans people .   LGBT youth <24 contemplating suicide, call the [employer] [phone].    POISON CONTROL CENTER  [phone]    OR  go to nearest ER    CHILD  \"[facility] has a needs assessment team who will do an intake evaluation. Based on the results of the intake they will recommended inpatient admission, partial hospitalization, " "intensive outpatient or outpatient care. The number is [phone] or [phone]. \"    CRISIS TEXT LINE  Http://www.crisistextline.org  FREE SUPPORT AT YOUR FINGERTIPS, 24/7  Crisis Text Line serves anyone, in any type of crisis, providing access to free, 24/7 support and information via the medium people already use and trust: text. Here s how it works:  1)  Text 366-897 from anywhere in the [place], anytime, about any type of crisis.  2)  A live, trained Crisis Counselor receives the text and responds quickly.      The volunteer Crisis Counselor will help you move from a 'hot moment to a cool moment'      * A Community Paramedic (CP) is an advanced paramedic that works to increase access to primary and preventive care and decrease use of emergency departments, which in turn decreases health care costs. Among other things, CPs may play a key role in providing follow-up services after a hospital discharge to prevent hospital readmission. CPs can provide health assessments, chronic disease monitoring and education, medication management, immunizations and vaccinations, laboratory specimen collection, hospital discharge follow-up care and minor medical procedures. CPs work under the direction of an Ambulance Medical Director.    "

## 2023-12-07 ENCOUNTER — OFFICE VISIT (OUTPATIENT)
Dept: DERMATOLOGY | Facility: CLINIC | Age: 63
End: 2023-12-07
Payer: COMMERCIAL

## 2023-12-07 DIAGNOSIS — L82.0 SEBORRHEIC KERATOSES, INFLAMED: ICD-10-CM

## 2023-12-07 DIAGNOSIS — L81.4 SOLAR LENTIGINOSIS: ICD-10-CM

## 2023-12-07 DIAGNOSIS — E11.42 TYPE 2 DIABETES MELLITUS WITH DIABETIC POLYNEUROPATHY, WITHOUT LONG-TERM CURRENT USE OF INSULIN (H): ICD-10-CM

## 2023-12-07 DIAGNOSIS — L57.0 AK (ACTINIC KERATOSIS): Primary | ICD-10-CM

## 2023-12-07 DIAGNOSIS — L98.9 LESION OF SKIN OF SCALP: ICD-10-CM

## 2023-12-07 PROCEDURE — 17110 DESTRUCTION B9 LES UP TO 14: CPT | Performed by: DERMATOLOGY

## 2023-12-07 RX ORDER — GLYBURIDE 5 MG/1
10 TABLET ORAL 2 TIMES DAILY WITH MEALS
Qty: 120 TABLET | Refills: 0 | Status: SHIPPED | OUTPATIENT
Start: 2023-12-07 | End: 2024-01-16

## 2023-12-07 ASSESSMENT — PAIN SCALES - GENERAL: PAINLEVEL: NO PAIN (0)
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
## 2023-12-07 NOTE — NURSING NOTE
Dermatology Rooming Note    Christopher Rodriguez's goals for this visit include:   Chief Complaint   Patient presents with    Derm Problem     Here today to have spots on scalp checked.      Mary Jacobo RN

## 2023-12-07 NOTE — TELEPHONE ENCOUNTER
Patient calling for refill for glyburide. He is out. Routing to refill pool.     Patient is due for A1C. Patient states he was just at an appointment for depression and they said they were going to need other lab work done, he did not specify what labs. He declined to schedule lab appointment at this time, as he would like to take care of all lab work at the same time.     He is requesting a 30 day supply.    Angela Coreas, RN, BSN, PHN  [facility]  Nurse Triage, [facility]

## 2023-12-08 ENCOUNTER — TELEPHONE (OUTPATIENT)
Dept: DERMATOLOGY | Facility: CLINIC | Age: 63
End: 2023-12-08
Payer: COMMERCIAL

## 2023-12-08 NOTE — TELEPHONE ENCOUNTER
Patient Contacted and schedule the following:    Appointment type: Return  Provider: Dr. Lacey  Return date: 6/20/24  Specialty phone number: [phone]

## 2024-01-07 PROBLEM — L81.4 SOLAR LENTIGINOSIS: Status: ACTIVE | Noted: 2024-01-07

## 2024-01-07 PROBLEM — L82.0 SEBORRHEIC KERATOSES, INFLAMED: Status: ACTIVE | Noted: 2024-01-07

## 2024-01-07 PROBLEM — L98.9 LESION OF SKIN OF SCALP: Status: ACTIVE | Noted: 2024-01-07

## 2024-01-09 ENCOUNTER — LAB (OUTPATIENT)
Dept: LAB | Facility: CLINIC | Age: 64
End: 2024-01-09
Payer: COMMERCIAL

## 2024-01-09 DIAGNOSIS — E11.9 TYPE 2 DIABETES MELLITUS WITHOUT COMPLICATION, WITHOUT LONG-TERM CURRENT USE OF INSULIN (H): ICD-10-CM

## 2024-01-09 DIAGNOSIS — E78.5 HYPERLIPIDEMIA LDL GOAL <100: ICD-10-CM

## 2024-01-09 DIAGNOSIS — E11.42 TYPE 2 DIABETES MELLITUS WITH DIABETIC POLYNEUROPATHY, WITHOUT LONG-TERM CURRENT USE OF INSULIN (H): ICD-10-CM

## 2024-01-09 LAB — HBA1C MFR BLD: 9.4 % (ref 0–5.6)

## 2024-01-09 PROCEDURE — 80061 LIPID PANEL: CPT

## 2024-01-09 PROCEDURE — 36415 COLL VENOUS BLD VENIPUNCTURE: CPT

## 2024-01-09 PROCEDURE — 80048 BASIC METABOLIC PNL TOTAL CA: CPT

## 2024-01-09 PROCEDURE — 84460 ALANINE AMINO (ALT) (SGPT): CPT

## 2024-01-09 PROCEDURE — 83036 HEMOGLOBIN GLYCOSYLATED A1C: CPT

## 2024-01-10 LAB
ALT SERPL W P-5'-P-CCNC: 26 U/L (ref 0–70)
ANION GAP SERPL CALCULATED.3IONS-SCNC: 9 MMOL/L (ref 7–15)
BUN SERPL-MCNC: 13.8 MG/DL (ref 8–23)
CALCIUM SERPL-MCNC: 9.1 MG/DL (ref 8.8–10.2)
CHLORIDE SERPL-SCNC: 100 MMOL/L (ref 98–107)
CHOLEST SERPL-MCNC: 169 MG/DL
CREAT SERPL-MCNC: 0.91 MG/DL (ref 0.67–1.17)
DEPRECATED HCO3 PLAS-SCNC: 29 MMOL/L (ref 22–29)
EGFRCR SERPLBLD CKD-EPI 2021: >90 ML/MIN/1.73M2
FASTING STATUS PATIENT QL REPORTED: YES
GLUCOSE SERPL-MCNC: 185 MG/DL (ref 70–99)
HDLC SERPL-MCNC: 37 MG/DL
LDLC SERPL CALC-MCNC: 98 MG/DL
NONHDLC SERPL-MCNC: 132 MG/DL
POTASSIUM SERPL-SCNC: 4.2 MMOL/L (ref 3.4–5.3)
SODIUM SERPL-SCNC: 138 MMOL/L (ref 135–145)
TRIGL SERPL-MCNC: 170 MG/DL

## 2024-01-16 DIAGNOSIS — E11.42 TYPE 2 DIABETES MELLITUS WITH DIABETIC POLYNEUROPATHY, WITHOUT LONG-TERM CURRENT USE OF INSULIN (H): ICD-10-CM

## 2024-01-16 DIAGNOSIS — F32.4 MAJOR DEPRESSIVE DISORDER WITH SINGLE EPISODE, IN PARTIAL REMISSION (H): ICD-10-CM

## 2024-01-16 RX ORDER — DESVENLAFAXINE 100 MG/1
100 TABLET, EXTENDED RELEASE ORAL DAILY
Qty: 90 TABLET | Refills: 0 | Status: SHIPPED | OUTPATIENT
Start: 2024-01-16 | End: 2024-04-18

## 2024-01-16 RX ORDER — GLYBURIDE 5 MG/1
10 TABLET ORAL 2 TIMES DAILY WITH MEALS
Qty: 120 TABLET | Refills: 0 | Status: SHIPPED | OUTPATIENT
Start: 2024-01-16 | End: 2024-01-23

## 2024-01-16 RX ORDER — BUPROPION HYDROCHLORIDE 300 MG/1
300 TABLET ORAL EVERY MORNING
Qty: 90 TABLET | Refills: 0 | Status: SHIPPED | OUTPATIENT
Start: 2024-01-16 | End: 2024-04-18

## 2024-01-17 ENCOUNTER — VIRTUAL VISIT (OUTPATIENT)
Dept: FAMILY MEDICINE | Facility: CLINIC | Age: 64
End: 2024-01-17
Payer: COMMERCIAL

## 2024-01-17 DIAGNOSIS — F33.1 MAJOR DEPRESSIVE DISORDER, RECURRENT EPISODE, MODERATE (H): ICD-10-CM

## 2024-01-17 DIAGNOSIS — E11.42 TYPE 2 DIABETES MELLITUS WITH DIABETIC POLYNEUROPATHY, WITHOUT LONG-TERM CURRENT USE OF INSULIN (H): Primary | ICD-10-CM

## 2024-01-17 DIAGNOSIS — F41.1 GENERALIZED ANXIETY DISORDER: ICD-10-CM

## 2024-01-17 PROCEDURE — 96127 BRIEF EMOTIONAL/BEHAV ASSMT: CPT | Mod: 95 | Performed by: FAMILY MEDICINE

## 2024-01-17 PROCEDURE — 99214 OFFICE O/P EST MOD 30 MIN: CPT | Mod: 95 | Performed by: FAMILY MEDICINE

## 2024-01-17 RX ORDER — PRAMIPEXOLE DIHYDROCHLORIDE 0.5 MG/1
TABLET ORAL
Qty: 90 TABLET | Refills: 0 | Status: SHIPPED | OUTPATIENT
Start: 2024-01-17 | End: 2024-02-19

## 2024-01-17 NOTE — PROGRESS NOTES
"Christopher is a 63 year old who is being evaluated via a billable video visit.        Instructions Relayed to Patient by Virtual Roomer:     If patient is not active on [employer]:  Relayed the following to patient: \"Would you like us to text or email you a link to join your appointment now or when your provider is ready to initiate the virtual visit?\"    Reminded patient to ensure they were logged on to virtual visit by arrival time listed. Documented in appointment notes if patient had flexibility to initiate visit sooner than arrival time. If pediatric virtual visit, ensured pediatric patient along with parent/guardian will be present for video visit.     Patient offered the website [email or web address] and/or phone number to [employer] Help line: [phone]   How would you like to obtain your AVS? Mail a copy  If the video visit is dropped, the invitation should be resent by: Text to cell phone: [phone]  Will anyone else be joining your video visit? No          Assessment & Plan     Type 2 diabetes mellitus with diabetic polyneuropathy, without long-term current use of insulin (H)  Not controlled - increase Trulicity dose.  - Dulaglutide 4.5 MG/0.5ML SOPN; Inject 4.5 mg Subcutaneous every 7 days    Major depressive disorder, recurrent episode, moderate (H)  Not controlled - labs to rule out other causes and add Mirapex  - EKG 12-lead complete w/read - Clinics  - Adult Sleep Eval & Management  Referral; Future  - Vitamin B12; Future  - Folate; Future  - Vitamin D Deficiency; Future  - TSH with free T4 reflex; Future  - pramipexole (MIRAPEX) 0.5 MG tablet; Take 1 tablet (0.5 mg) by mouth at bedtime for 7 days, THEN 2 tablets (1 mg) at bedtime for 21 days.    Generalized anxiety disorder  As above  - pramipexole (MIRAPEX) 0.5 MG tablet; Take 1 tablet (0.5 mg) by mouth at bedtime for 7 days, THEN 2 tablets (1 mg) at bedtime for 21 days.      BMI  Estimated body mass index is 35.73 kg/m  as " "calculated from the following:    Height as of 11/17/23: 1.778 m (5' 10\").    Weight as of 11/17/23: 112.9 kg (249 lb).   Weight management plan: Discussed healthy diet and exercise guidelines    The uses and side effects, including black box warnings as appropriate, were discussed in detail.  All patient questions were answered.  The patient was instructed to call immediately if any side effects developed.    Follow up in 1 month.    Chan White is a 63 year old, presenting for the following health issues:  Depression and Recheck Medication        1/17/2024     3:59 PM   Additional Questions   Roomed by Homar BOYD         1/17/2024     3:59 PM   Patient Reported Additional Medications   Patient reports taking the following new medications \"[facility]\"     History of Present Illness       Mental Health Follow-up:  Patient presents to follow-up on Depression.Patient's depression since last visit has been:  Worse  The patient is not having other symptoms associated with depression.      Any significant life events: No  Patient is feeling anxious or having panic attacks.  Patient has no concerns about alcohol or drug use.    He eats 2-3 servings of fruits and vegetables daily.He consumes 1 sweetened beverage(s) daily.He exercises with enough effort to increase his heart rate 30 to 60 minutes per day.  He exercises with enough effort to increase his heart rate 5 days per week.   He is taking medications regularly.       Review of Systems  Constitutional, HEENT, cardiovascular, pulmonary, gi and gu systems are negative, except as otherwise noted.      Objective           Vitals:  No vitals were obtained today due to virtual visit.      Physical Exam   GENERAL: alert, no distress, and obese  EYES: Eyes grossly normal to inspection.  No discharge or erythema, or obvious scleral/conjunctival abnormalities.  RESP: No audible wheeze, cough, or visible cyanosis.    SKIN: Visible skin clear. " No significant rash, abnormal pigmentation or lesions.  NEURO: Cranial nerves grossly intact.  Mentation and speech appropriate for age.  PSYCH: mentation appears normal, affect normal/bright, and tics noted          Video-Visit Details    Type of service:  Video Visit     Originating Location (pt. Location): Home    Distant Location (provider location):  On-site  Platform used for Video Visit: Indira  Signed Electronically by: Su Stubbs MD

## 2024-01-23 ENCOUNTER — TELEPHONE (OUTPATIENT)
Dept: FAMILY MEDICINE | Facility: CLINIC | Age: 64
End: 2024-01-23
Payer: COMMERCIAL

## 2024-01-23 DIAGNOSIS — E11.42 TYPE 2 DIABETES MELLITUS WITH DIABETIC POLYNEUROPATHY, WITHOUT LONG-TERM CURRENT USE OF INSULIN (H): ICD-10-CM

## 2024-01-23 RX ORDER — GLYBURIDE 5 MG/1
10 TABLET ORAL 2 TIMES DAILY WITH MEALS
Qty: 120 TABLET | Refills: 0 | Status: SHIPPED | OUTPATIENT
Start: 2024-01-23 | End: 2024-02-19

## 2024-01-23 NOTE — TELEPHONE ENCOUNTER
Patient states provider sent in refills to [facility] and they didn't get two of them. The trulicity injections and glyburide pills.   Writer called [facility] pharmacy and trulicity was dispensed yesterday. They did not receive prescription for glyburide.    Writer will resend glyburide to the pharmacy.    Gita Freeman RN    [facility]- Primary Care

## 2024-02-19 ENCOUNTER — VIRTUAL VISIT (OUTPATIENT)
Dept: FAMILY MEDICINE | Facility: CLINIC | Age: 64
End: 2024-02-19
Attending: FAMILY MEDICINE
Payer: COMMERCIAL

## 2024-02-19 DIAGNOSIS — E66.01 MORBID OBESITY (H): ICD-10-CM

## 2024-02-19 DIAGNOSIS — F41.1 GENERALIZED ANXIETY DISORDER: ICD-10-CM

## 2024-02-19 DIAGNOSIS — F33.1 MAJOR DEPRESSIVE DISORDER, RECURRENT EPISODE, MODERATE (H): ICD-10-CM

## 2024-02-19 DIAGNOSIS — E11.42 TYPE 2 DIABETES MELLITUS WITH DIABETIC POLYNEUROPATHY, WITHOUT LONG-TERM CURRENT USE OF INSULIN (H): Primary | ICD-10-CM

## 2024-02-19 PROCEDURE — 96127 BRIEF EMOTIONAL/BEHAV ASSMT: CPT | Mod: 95 | Performed by: FAMILY MEDICINE

## 2024-02-19 PROCEDURE — 99214 OFFICE O/P EST MOD 30 MIN: CPT | Mod: 95 | Performed by: FAMILY MEDICINE

## 2024-02-19 RX ORDER — PRAMIPEXOLE DIHYDROCHLORIDE 0.5 MG/1
TABLET ORAL
Qty: 120 TABLET | Refills: 0 | Status: SHIPPED | OUTPATIENT
Start: 2024-02-19 | End: 2024-03-28

## 2024-02-19 RX ORDER — GLYBURIDE 5 MG/1
10 TABLET ORAL 2 TIMES DAILY WITH MEALS
Qty: 360 TABLET | Refills: 0 | Status: SHIPPED | OUTPATIENT
Start: 2024-02-19 | End: 2024-04-18

## 2024-02-19 ASSESSMENT — PATIENT HEALTH QUESTIONNAIRE - PHQ9: SUM OF ALL RESPONSES TO PHQ QUESTIONS 1-9: 7

## 2024-02-19 NOTE — PROGRESS NOTES
"    Instructions Relayed to Patient by Virtual Roomer:     If patient is not active on Ezetap:  Relayed the following to patient: \"Would you like us to text or email you a link to join your appointment now or when your provider is ready to initiate the virtual visit?\"    Reminded patient to ensure they were logged on to virtual visit by arrival time listed. Documented in appointment notes if patient had flexibility to initiate visit sooner than arrival time. If pediatric virtual visit, ensured pediatric patient along with parent/guardian will be present for video visit.     Patient offered the website [email or web address] and/or phone number to [employer] Help line: [phone]    Christopher is a 64 year old who is being evaluated via a billable video visit.      How would you like to obtain your AVS? Mail a copy  If the video visit is dropped, the invitation should be resent by: Text to cell phone: [phone]  Will anyone else be joining your video visit? No          Assessment & Plan     Type 2 diabetes mellitus with diabetic polyneuropathy, without long-term current use of insulin (H)  Not at goal - GLP-1 increased at last visit.  Recheck in 2 months.  - glyBURIDE (DIABETA /MICRONASE) 5 MG tablet; Take 2 tablets (10 mg) by mouth 2 times daily (with meals)    Morbid obesity (H)  GLP1 as above.    Major depressive disorder, recurrent episode, moderate (H)  Slight improvement without side effects - continue increasing dose and follow up in 1 month.  - pramipexole (MIRAPEX) 0.5 MG tablet; Take 3 tablets (1.5 mg) by mouth at bedtime for 7 days, THEN 4 tablets (2 mg) at bedtime for 21 days.    Generalized anxiety disorder  As above and less tics noted this visit.  - pramipexole (MIRAPEX) 0.5 MG tablet; Take 3 tablets (1.5 mg) by mouth at bedtime for 7 days, THEN 4 tablets (2 mg) at bedtime for 21 days.        The uses and side effects, including black box warnings as appropriate, were discussed in detail.  " All patient questions were answered.  The patient was instructed to call immediately if any side effects developed.     Chan White is a 64 year old, presenting for the following health issues:  Depression      2/19/2024     3:03 PM   Additional Questions   Roomed by kp   Accompanied by self     History of Present Illness       Mental Health Follow-up:  Patient presents to follow-up on Depression.Patient's depression since last visit has been:  No change  The patient is not having other symptoms associated with depression.      Any significant life events: No  Patient is not feeling anxious or having panic attacks.  Patient has no concerns about alcohol or drug use.          11/17/2023     3:51 PM 1/17/2024     4:03 PM 2/19/2024     3:01 PM   PHQ   PHQ-9 Total Score 9 9 7   Q9: Thoughts of better off dead/self-harm past 2 weeks Not at all Not at all Not at all              Objective           Vitals:  No vitals were obtained today due to virtual visit.    Physical Exam   GENERAL: alert and no distress  EYES: Eyes grossly normal to inspection.  No discharge or erythema, or obvious scleral/conjunctival abnormalities.  RESP: No audible wheeze, cough, or visible cyanosis.    SKIN: Visible skin clear. No significant rash, abnormal pigmentation or lesions.  NEURO: Cranial nerves grossly intact.  Mentation and speech appropriate for age.  PSYCH: mentation appears normal and affect normal/bright          Video-Visit Details    Type of service:  Video Visit     Originating Location (pt. Location): Home    Distant Location (provider location):  On-site  Platform used for Video Visit: Indira  Signed Electronically by: Su Stubbs MD

## 2024-03-13 ENCOUNTER — LAB (OUTPATIENT)
Dept: LAB | Facility: CLINIC | Age: 64
End: 2024-03-13
Payer: COMMERCIAL

## 2024-03-13 DIAGNOSIS — E11.42 TYPE 2 DIABETES MELLITUS WITH DIABETIC POLYNEUROPATHY, WITHOUT LONG-TERM CURRENT USE OF INSULIN (H): Primary | ICD-10-CM

## 2024-03-13 DIAGNOSIS — F33.1 MAJOR DEPRESSIVE DISORDER, RECURRENT EPISODE, MODERATE (H): ICD-10-CM

## 2024-03-13 LAB — FOLATE SERPL-MCNC: 12 NG/ML (ref 4.6–34.8)

## 2024-03-13 PROCEDURE — 82306 VITAMIN D 25 HYDROXY: CPT

## 2024-03-13 PROCEDURE — 36415 COLL VENOUS BLD VENIPUNCTURE: CPT

## 2024-03-13 PROCEDURE — 82607 VITAMIN B-12: CPT

## 2024-03-13 PROCEDURE — 82043 UR ALBUMIN QUANTITATIVE: CPT

## 2024-03-13 PROCEDURE — 84443 ASSAY THYROID STIM HORMONE: CPT

## 2024-03-13 PROCEDURE — 82570 ASSAY OF URINE CREATININE: CPT

## 2024-03-13 PROCEDURE — 82746 ASSAY OF FOLIC ACID SERUM: CPT

## 2024-03-14 LAB
CREAT UR-MCNC: 373 MG/DL
MICROALBUMIN UR-MCNC: 25.5 MG/L
MICROALBUMIN/CREAT UR: 6.84 MG/G CR (ref 0–17)
TSH SERPL DL<=0.005 MIU/L-ACNC: 2.32 UIU/ML (ref 0.3–4.2)
VIT D+METAB SERPL-MCNC: 30 NG/ML (ref 20–50)

## 2024-03-15 LAB — VIT B12 SERPL-MCNC: 629 PG/ML (ref 232–1245)

## 2024-03-19 NOTE — RESULT ENCOUNTER NOTE
Michael,    All of your labs were normal for you.    Please contact the clinic if you have additional questions.  Thank you.    Sincerely,    Su Stubbs MD

## 2024-03-26 DIAGNOSIS — F33.1 MAJOR DEPRESSIVE DISORDER, RECURRENT EPISODE, MODERATE (H): ICD-10-CM

## 2024-03-26 DIAGNOSIS — F41.1 GENERALIZED ANXIETY DISORDER: ICD-10-CM

## 2024-03-28 RX ORDER — PRAMIPEXOLE DIHYDROCHLORIDE 0.5 MG/1
2 TABLET ORAL AT BEDTIME
Qty: 120 TABLET | Refills: 0 | Status: SHIPPED | OUTPATIENT
Start: 2024-03-28 | End: 2024-04-18

## 2024-04-18 DIAGNOSIS — F41.1 GENERALIZED ANXIETY DISORDER: ICD-10-CM

## 2024-04-18 DIAGNOSIS — R39.198 SLOW URINARY STREAM: ICD-10-CM

## 2024-04-18 DIAGNOSIS — E11.42 TYPE 2 DIABETES MELLITUS WITH DIABETIC POLYNEUROPATHY, WITHOUT LONG-TERM CURRENT USE OF INSULIN (H): ICD-10-CM

## 2024-04-18 DIAGNOSIS — F33.1 MAJOR DEPRESSIVE DISORDER, RECURRENT EPISODE, MODERATE (H): ICD-10-CM

## 2024-04-18 DIAGNOSIS — R39.15 URINARY URGENCY: ICD-10-CM

## 2024-04-18 DIAGNOSIS — I10 HYPERTENSION GOAL BP (BLOOD PRESSURE) < 140/90: ICD-10-CM

## 2024-04-18 DIAGNOSIS — E78.5 HYPERLIPIDEMIA LDL GOAL <100: ICD-10-CM

## 2024-04-18 DIAGNOSIS — F32.4 MAJOR DEPRESSIVE DISORDER WITH SINGLE EPISODE, IN PARTIAL REMISSION (H): ICD-10-CM

## 2024-04-18 RX ORDER — GLYBURIDE 5 MG/1
10 TABLET ORAL 2 TIMES DAILY WITH MEALS
Qty: 360 TABLET | Refills: 1 | Status: SHIPPED | OUTPATIENT
Start: 2024-04-18

## 2024-04-18 RX ORDER — LOSARTAN POTASSIUM 100 MG/1
100 TABLET ORAL DAILY
Qty: 90 TABLET | Refills: 3 | Status: SHIPPED | OUTPATIENT
Start: 2024-04-18

## 2024-04-18 RX ORDER — ATORVASTATIN CALCIUM 40 MG/1
40 TABLET, FILM COATED ORAL EVERY MORNING
Qty: 90 TABLET | Refills: 2 | Status: SHIPPED | OUTPATIENT
Start: 2024-04-18

## 2024-04-18 RX ORDER — DESVENLAFAXINE 100 MG/1
100 TABLET, EXTENDED RELEASE ORAL DAILY
Qty: 90 TABLET | Refills: 1 | Status: SHIPPED | OUTPATIENT
Start: 2024-04-18

## 2024-04-18 RX ORDER — PRAMIPEXOLE DIHYDROCHLORIDE 0.5 MG/1
2 TABLET ORAL AT BEDTIME
Qty: 360 TABLET | Refills: 1 | Status: SHIPPED | OUTPATIENT
Start: 2024-04-18 | End: 2024-05-16

## 2024-04-18 RX ORDER — BUPROPION HYDROCHLORIDE 300 MG/1
300 TABLET ORAL EVERY MORNING
Qty: 90 TABLET | Refills: 1 | Status: SHIPPED | OUTPATIENT
Start: 2024-04-18

## 2024-04-18 RX ORDER — TAMSULOSIN HYDROCHLORIDE 0.4 MG/1
0.4 CAPSULE ORAL DAILY
Qty: 90 CAPSULE | Refills: 1 | Status: SHIPPED | OUTPATIENT
Start: 2024-04-18 | End: 2024-05-16

## 2024-04-18 NOTE — TELEPHONE ENCOUNTER
.  Medication Question or Refill    Contacts         Type Contact Phone/Fax    04/18/2024 02:56 PM CDT Fax (Incoming) [id number], [place] - [address] (Pharmacy) [phone]     ATORVASTATIN 40MG          What medication are you calling about (include dose and sig)?:  ATORVASTATIN 40MG     Preferred Pharmacy:    [id number], [place] - [address]  [address] [address]  Phone: [phone] Fax: [phone]      Controlled Substance Agreement on file:   CSA -- Patient Level:    CSA: None found at the patient level.       Who prescribed the medication?:     Do you need a refill? Yes    When did you use the medication last? N/A     Patient offered an appointment? No    Do you have any questions or concerns?  N/A      Okay to leave a detailed message?:  N/A

## 2024-05-15 PROBLEM — N52.8 OTHER MALE ERECTILE DYSFUNCTION: Chronic | Status: ACTIVE | Noted: 2020-03-18

## 2024-05-15 PROBLEM — I10 HYPERTENSION GOAL BP (BLOOD PRESSURE) < 140/90: Chronic | Status: ACTIVE | Noted: 2020-03-18

## 2024-05-15 PROBLEM — E66.01 MORBID OBESITY (H): Chronic | Status: ACTIVE | Noted: 2020-03-18

## 2024-05-15 PROBLEM — F33.1 MAJOR DEPRESSIVE DISORDER, RECURRENT EPISODE, MODERATE (H): Chronic | Status: ACTIVE | Noted: 2023-05-12

## 2024-05-15 PROBLEM — F32.4 MAJOR DEPRESSIVE DISORDER WITH SINGLE EPISODE, IN PARTIAL REMISSION (H): Status: RESOLVED | Noted: 2020-03-18 | Resolved: 2024-05-15

## 2024-05-15 PROBLEM — E11.42 TYPE 2 DIABETES MELLITUS WITH DIABETIC POLYNEUROPATHY, WITHOUT LONG-TERM CURRENT USE OF INSULIN (H): Chronic | Status: ACTIVE | Noted: 2020-03-18

## 2024-05-15 PROBLEM — F41.1 GENERALIZED ANXIETY DISORDER: Chronic | Status: ACTIVE | Noted: 2023-05-12

## 2024-05-16 ENCOUNTER — OFFICE VISIT (OUTPATIENT)
Dept: SLEEP MEDICINE | Facility: CLINIC | Age: 64
End: 2024-05-16
Attending: FAMILY MEDICINE
Payer: COMMERCIAL

## 2024-05-16 VITALS
DIASTOLIC BLOOD PRESSURE: 85 MMHG | OXYGEN SATURATION: 97 % | HEIGHT: 70 IN | HEART RATE: 87 BPM | BODY MASS INDEX: 35.5 KG/M2 | RESPIRATION RATE: 16 BRPM | SYSTOLIC BLOOD PRESSURE: 129 MMHG | WEIGHT: 248 LBS

## 2024-05-16 DIAGNOSIS — Z72.820 LACK OF ADEQUATE SLEEP: ICD-10-CM

## 2024-05-16 DIAGNOSIS — F33.1 MAJOR DEPRESSIVE DISORDER, RECURRENT EPISODE, MODERATE (H): ICD-10-CM

## 2024-05-16 DIAGNOSIS — I10 ESSENTIAL HYPERTENSION: ICD-10-CM

## 2024-05-16 DIAGNOSIS — R06.89 DYSPNEA AND RESPIRATORY ABNORMALITY: Primary | ICD-10-CM

## 2024-05-16 DIAGNOSIS — E66.812 CLASS 2 SEVERE OBESITY DUE TO EXCESS CALORIES WITH SERIOUS COMORBIDITY AND BODY MASS INDEX (BMI) OF 35.0 TO 35.9 IN ADULT (H): ICD-10-CM

## 2024-05-16 DIAGNOSIS — R53.81 MALAISE AND FATIGUE: ICD-10-CM

## 2024-05-16 DIAGNOSIS — E66.01 CLASS 2 SEVERE OBESITY DUE TO EXCESS CALORIES WITH SERIOUS COMORBIDITY AND BODY MASS INDEX (BMI) OF 35.0 TO 35.9 IN ADULT (H): ICD-10-CM

## 2024-05-16 DIAGNOSIS — R06.00 DYSPNEA AND RESPIRATORY ABNORMALITY: Primary | ICD-10-CM

## 2024-05-16 DIAGNOSIS — R53.83 MALAISE AND FATIGUE: ICD-10-CM

## 2024-05-16 PROCEDURE — 99244 OFF/OP CNSLTJ NEW/EST MOD 40: CPT | Performed by: PHYSICIAN ASSISTANT

## 2024-05-16 RX ORDER — TAMSULOSIN HYDROCHLORIDE 0.4 MG/1
0.4 CAPSULE ORAL DAILY
COMMUNITY

## 2024-05-16 RX ORDER — ZOLPIDEM TARTRATE 5 MG/1
TABLET ORAL
Qty: 1 TABLET | Refills: 0 | Status: SHIPPED | OUTPATIENT
Start: 2024-05-16

## 2024-05-16 ASSESSMENT — SLEEP AND FATIGUE QUESTIONNAIRES
HOW LIKELY ARE YOU TO NOD OFF OR FALL ASLEEP IN A CAR, WHILE STOPPED FOR A FEW MINUTES IN TRAFFIC: WOULD NEVER DOZE
HOW LIKELY ARE YOU TO NOD OFF OR FALL ASLEEP WHILE SITTING QUIETLY AFTER LUNCH WITHOUT ALCOHOL: WOULD NEVER DOZE
HOW LIKELY ARE YOU TO NOD OFF OR FALL ASLEEP WHILE SITTING AND TALKING TO SOMEONE: WOULD NEVER DOZE
HOW LIKELY ARE YOU TO NOD OFF OR FALL ASLEEP WHILE WATCHING TV: MODERATE CHANCE OF DOZING
HOW LIKELY ARE YOU TO NOD OFF OR FALL ASLEEP WHILE SITTING AND READING: SLIGHT CHANCE OF DOZING
HOW LIKELY ARE YOU TO NOD OFF OR FALL ASLEEP WHEN YOU ARE A PASSENGER IN A CAR FOR AN HOUR WITHOUT A BREAK: WOULD NEVER DOZE
HOW LIKELY ARE YOU TO NOD OFF OR FALL ASLEEP WHILE SITTING INACTIVE IN A PUBLIC PLACE: WOULD NEVER DOZE
HOW LIKELY ARE YOU TO NOD OFF OR FALL ASLEEP WHILE LYING DOWN TO REST IN THE AFTERNOON WHEN CIRCUMSTANCES PERMIT: MODERATE CHANCE OF DOZING
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
## 2024-05-16 NOTE — NURSING NOTE
"Chief Complaint   Patient presents with    Sleep Problem     Referred due to low pulse, per patient.        Initial /85   Pulse 87   Resp 16   Ht 1.778 m (5' 10\")   Wt 112.5 kg (248 lb)   SpO2 97%   BMI 35.58 kg/m   Estimated body mass index is 35.58 kg/m  as calculated from the following:    Height as of this encounter: 1.778 m (5' 10\").    Weight as of this encounter: 112.5 kg (248 lb).    Medication Reconciliation: complete    Neck circumference: 18 inches / 46 centimeters.    Jony Loera CMA on 5/16/2024 at 3:11 PM     "

## 2024-05-16 NOTE — PATIENT INSTRUCTIONS
"          MY TREATMENT INFORMATION FOR SLEEP APNEA-  Christopher Rodriguez    DOCTOR : ANNE Alexander    Am I having a sleep study at a sleep center?  --->Due to normal delays, you will be contacted within 2-4 weeks to schedule    Am I having a home sleep study?  --->Watch the video for the device you are using:    -/drop off device-   https://www.[employer].com/watch?v=yGGFBdELGhk    -Disposable device sent out require phone/computer application-   https://www.[employer].com/watch?v=BCce_vbiwxE      Frequently asked questions:  1. What is Obstructive Sleep Apnea (SKYE)? SKYE is the most common type of sleep apnea. Apnea means, \"without breath.\"  Apnea is most often caused by narrowing or collapse of the upper airway as muscles relax during sleep.   Almost everyone has occasional apneas. Most people with sleep apnea have had brief interruptions at night frequently for many years.  The severity of sleep apnea is related to how frequent and severe the events are.   2. What are the consequences of SKYE? Symptoms include: feeling sleepy during the day, snoring loudly, gasping or stopping of breathing, trouble sleeping, and occasionally morning headaches or heartburn at night.  Sleepiness can be serious and even increase the risk of falling asleep while driving. Other health consequences may include development of high blood pressure and other cardiovascular disease in persons who are susceptible. Untreated SKYE  can contribute to heart disease, stroke and diabetes.   3. What are the treatment options? In most situations, sleep apnea is a lifelong disease that must be managed with daily therapy. Medications are not effective for sleep apnea and surgery is generally not considered until other therapies have been tried. Your treatment is your choice . Continuous Positive Airway (CPAP) works right away and is the therapy that is effective in nearly everyone. An oral device to hold your jaw forward is usually the next most reliable " option. Other options include postioning devices (to keep you off your back), weight loss, and surgery including a tongue pacing device. There is more detail about some of these options below.  4. Are my sleep studies covered by insurance? Although we will request verification of coverage, we advise you also check in advance of the study to ensure there is coverage.    Important tips for those choosing CPAP and similar devices  REMEMBER-IF YOU RECEIVE A CALL FROM  [phone]-->IT IS TO SETUP A DEVICE  For new devices, sign up for device SHANNON to monitor your device for your followup visits  We encourage you to utilize the [employer] shannon or website ( https://[employer]/ ) to monitor your therapy progress and share the data with your healthcare team when you discuss your sleep apnea.                                                    Know your equipment:  CPAP is continuous positive airway pressure that prevents obstructive sleep apnea by keeping the throat from collapsing while you are sleeping. In most cases, the device is  smart  and can slowly self-adjusts if your throat collapses and keeps a record every day of how well you are treated-this information is available to you and your care team.  BPAP is bilevel positive airway pressure that keeps your throat open and also assists each breath with a pressure boost to maintain adequate breathing.  Special kinds of BPAP are used in patients who have inadequate breathing from lung or heart disease. In most cases, the device is  smart  and can slowly self-adjusts to assist breathing. Like CPAP, the device keeps a record of how well you are treated.  Your mask is your connection to the device. You get to choose what feels most comfortable and the staff will help to make sure if fits. Here: are some examples of the different masks that are available: Magnetic mask aids may assist with use but there are safety issues that should be addressed when considering with  magnets* ( see end of discussion).       Key points to remember on your journey with sleep apnea:  Sleep study.  PAP devices often need to be adjusted during a sleep study to show that they are effective and adjusted right.  Good tips to remember: Try wearing just the mask during a quiet time during the day so your body adapts to wearing it. A humidifier is recommended for comfort in most cases to prevent drying of your nose and throat. Allergy medication from your provider may help you if you are having nasal congestion.  Getting settled-in. It takes more than one night for most of us to get used to wearing a mask. Try wearing just the mask during a quiet time during the day so your body adapts to wearing it. A humidifier is recommended for comfort in most cases. Our team will work with you carefully on the first day and will be in contact within 4 days and again at 2 and 4 weeks for advice and remote device adjustments. Your therapy is evaluated by the device each day.   Use it every night. The more you are able to sleep naturally for 7-8 hours, the more likely you will have good sleep and to prevent health risks or symptoms from sleep apnea. Even if you use it 4 hours it helps. Occasionally all of us are unable to use a medical therapy, in sleep apnea, it is not dangerous to miss one night.   Communicate. Call our skilled team on the number provided on the first day if your visit for problems that make it difficult to wear the device. Over 2 out of 3 patients can learn to wear the device long-term with help from our team. Remember to call our team or your sleep providers if you are unable to wear the device as we may have other solutions for those who cannot adapt to mask CPAP therapy. It is recommended that you sleep your sleep provider within the first 3 months and yearly after that if you are not having problems.   Use it for your health. We encourage use of CPAP masks during daytime quiet periods to allow  your face and brain to adapt to the sensation of CPAP so that it will be a more natural sensation to awaken to at night or during naps. This can be very useful during the first few weeks or months of adapting to CPAP though it does not help medically to wear CPAP during wakefulness and  should not be used as a strategy just to meet guidelines.  Take care of your equipment. Make sure you clean your mask and tubing using directions every day and that your filter and mask are replaced as recommended or if they are not working.     *Masks with magnets:  Updated Contraindications  Masks with magnetic components are contraindicated for use by patients where they, or anyone in close physical contact while using the mask, have the following:   Active medical implants that interact with magnets (i.e., pacemakers, implantable cardioverter defibrillators (ICD), neurostimulators, cerebrospinal fluid (CSF) shunts, insulin/infusion pumps)   Metallic implants/objects containing ferromagnetic material (i.e., aneurysm clips/flow disruption devices, embolic coils, stents, valves, electrodes, implants to restore hearing or balance with implanted magnets, ocular implants, metallic splinters in the eye)  Updated Warning  Keep the mask magnets at a safe distance of at least 6 inches (150 mm) away from implants or medical devices that may be adversely affected by magnetic interference. This warning applies to you or anyone in close physical contact with your mask. The magnets are in the frame and lower headgear clips, with a magnetic field strength of up to 400mT. When worn, they connect to secure the mask but may inadvertently detach while asleep.  Implants/medical devices, including those listed within contraindications, may be adversely affected if they change function under external magnetic fields or contain ferromagnetic materials that attract/repel to magnetic fields (some metallic implants, e.g., contact lenses with metal, dental  implants, metallic cranial plates, screws, alexsander hole covers, and bone substitute devices). Consult your physician and  of your implant / other medical device for information on the potential adverse effects of magnetic fields.    BESIDES CPAP, WHAT OTHER THERAPIES ARE THERE?    Positioning Device  Positioning devices are generally used when sleep apnea is mild and only occurs on your back.This example shows a pillow that straps around the waist. It may be appropriate for those whose sleep study shows milder sleep apnea that occurs primarily when lying flat on one's back. Preliminary studies have shown benefit but effectiveness at home may need to be verified by a home sleep test. These devices are generally not covered by medical insurance.  Examples of devices that maintain sleeping on the back to prevent snoring and mild sleep apnea.    Belt type body positioner  http://[employer]/    Electronic reminder  http://nightshifttherapy.com/            Oral Appliance  What is oral appliance therapy?  An oral appliance device fits on your teeth at night like a retainer used after having braces. The device is made by a specialized dentist and requires several visits over 1-2 months before a manufactured device is made to fit your teeth and is adjusted to prevent your sleep apnea. Once an oral device is working properly, snoring should be improved. A home sleep test may be recommended at that time if to determine whether the sleep apnea is adequately treated.       Some things to remember:  -Oral devices are often, but not always, covered by your medical insurance. Be sure to check with your insurance provider.   -If you are referred for oral therapy, you will be given a list of specialized dentists to consider or you may choose to visit the Web site of the American Academy of Dental Sleep Medicine  -Oral devices are less likely to work if you have severe sleep apnea or are extremely overweight.     More  detailed information  An oral appliance is a small acrylic device that fits over the upper and lower teeth  (similar to a retainer or a mouth guard). This device slightly moves jaw forward, which moves the base of the tongue forward, opens the airway, improves breathing for effective treat snoring and obstructive sleep apnea in perhaps 7 out of 10 people .  The best working devices are custom-made by a dental device  after a mold is made of the teeth 1, 2, 3.  When is an oral appliance indicated?  Oral appliance therapy is recommended as a first-line treatment for patients with primary snoring, mild sleep apnea, and for patients with moderate sleep apnea who prefer appliance therapy to use of CPAP4, 5. Severity of sleep apnea is determined by sleep testing and is based on the number of respiratory events per hour of sleep.   How successful is oral appliance therapy?  The success rate of oral appliance therapy in patients with mild sleep apnea is 75-80% while in patients with moderate sleep apnea it is 50-70%. The chance of success in patients with severe sleep apnea is 40-50%. The research also shows that oral appliances have a beneficial effect on the cardiovascular health of SKYE patients at the same magnitude as CPAP therapy7.  Oral appliances should be a second-line treatment in cases of severe sleep apnea, but if not completely successful then a combination therapy utilizing CPAP plus oral appliance therapy may be effective. Oral appliances tend to be effective in a broad range of patients although studies show that the patients who have the highest success are females, younger patients, those with milder disease, and less severe obesity. 3, 6.   Finding a dentist that practices dental sleep medicine  Specific training is available through the American Academy of Dental Sleep Medicine for dentists interested in working in the field of sleep. To find a dentist who is educated in the field of sleep and  the use of oral appliances, near you, visit the Web site of the American Academy of Dental Sleep Medicine.    References  1. Vega, et al. Objectively measured vs self-reported compliance during oral appliance therapy for sleep-disordered breathing. Chest 2013; 144(5): 5296-3601.  2. Dona et al. Objective measurement of compliance during oral appliance therapy for sleep-disordered breathing. Thorax 2013; 68(1): 91-96.  3. Silva et al. Mandibular advancement devices in 620 men and women with SKYE and snoring: tolerability and predictors of treatment success. Chest 2004; 125: 0454-6333.  4. Rosemarie, et al. Oral appliances for snoring and SKYE: a review. Sleep 2006; 29: 244-262.  5. Tamara et al. Oral appliance treatment for SKYE: an update. J Clin Sleep Med 2014; 10(2): 215-227.  6. Josefina et al. Predictors of OSAH treatment outcome. J Dent Res 2007; 86: 2117-6601.      Weight Loss:   Your Body mass index is 35.58 kg/m .    Being overweight does not necessarily mean you will have health consequences.  Those who have BMI over 35 or over 27 with existing medical conditions carries greater risk.   Weight loss decreases severity of sleep apnea in most people with obesity. For those with mild obesity who have developed snoring with weight gain, even 15-30 pound weight loss can improve and occasionally milder eliminate sleep apnea.  Structured and life-long dietary and health habits are necessary to lose weight and keep healthier weight levels.     The Comprehensive Weight loss program offers all aspects of weight loss strategies including two Non-Surgical Weight Loss Programs: Medical Weight Management and our 24 Week Healthy Lifestyle Program:    Medical Weight Management: You will meet with a Medical Weight Management Provider, as well as a Registered Dietician. The program may include medication therapy, dietary education, recommended exercise and physical therapy programs, monthly support group  meetings, and possible psychological counseling. Follow up visits with the provider or dietician are scheduled based on your progress and needs.    24 Week Healthy Lifestyle Program: This unique program is designed to give you the support of weekly appointments and activities thru a 24-week period. It may include all of the components of the basic program (above), with the addition of 11 individual Health  Visits, 24-week access to the [employer] website for over 700 online classes, and monthly support group meetings. This program has an out-of-pocket expense of $499 to cover the items that can not be billed to insurance (health coaches and [employer] access), and is non-refundable/non-transferable (you may be able to use a Health Savings Account; ask your HSA provider). There may be an optional meal replacement plan prescribed as well.   Surgical management achieves meaningful long-term weight loss and improvement in health risks in most patients with more severe obesity.      Sleep Apnea Surgery:    Surgery for obstructive sleep apnea is considered generally only when other therapies fail to work. Surgery may be discussed with you if you are having a difficult time tolerating CPAP and or when there is an abnormal structure that requires surgical correction.  Nose and throat surgeries often enlarge the airway to prevent collapse.  Most of these surgeries create pain for 1-2 weeks and up to half of the most common surgeries are not effective throughout life.  You should carefully discuss the benefits and drawbacks to surgery with your sleep provider and surgeon to determine if it is the best solution for you.   More information  Surgery for SKYE is directed at areas that are responsible for narrowing or complete obstruction of the airway during sleep.  There are a wide range of procedures available to enlarge and/or stabilize the airway to prevent blockage of breathing in the three major areas where it can occur:  the palate, tongue, and nasal regions.  Successful surgical treatment depends on the accurate identification of the factors responsible for obstructive sleep apnea in each person.  A personalized approach is required because there is no single treatment that works well for everyone.  Because of anatomic variation, consultation with an examination by a sleep surgeon is a critical first step in determining what surgical options are best for each patient.  In some cases, examination during sedation may be recommended in order to guide the selection of procedures.  Patients will be counseled about risks and benefits as well as the typical recovery course after surgery. Surgery is typically not a cure for a person s SKYE.  However, surgery will often significantly improve one s SKYE severity (termed  success rate ).  Even in the absence of a cure, surgery will decrease the cardiovascular risk associated with OSA7; improve overall quality of life8 (sleepiness, functionality, sleep quality, etc).      Palate Procedures:  Patients with SKYE often have narrowing of their airway in the region of their tonsils and uvula.  The goals of palate procedures are to widen the airway in this region as well as to help the tissues resist collapse.  Modern palate procedure techniques focus on tissue conservation and soft tissue rearrangement, rather than tissue removal.  Often the uvula is preserved in this procedure. Residual sleep apnea is common in patient after pharyngoplasty with an average reduction in sleep apnea events of 33%2.      Tongue Procedures:  ExamWhile patients are awake, the muscles that surround the throat are active and keep this region open for breathing. These muscles relax during sleep, allowing the tongue and other structures to collapse and block breathing.  There are several different tongue procedures available.  Selection of a tongue base procedure depends on characteristics seen on physical exam.  Generally,  procedures are aimed at removing bulky tissues in this area or preventing the back of the tongue from falling back during sleep.  Success rates for tongue surgery range from 50-62%3.    Hypoglossal Nerve Stimulation:  Hypoglossal nerve stimulation has recently received approval from the [place] Food and Drug Administration for the treatment of obstructive sleep apnea.  This is based on research showing that the system was safe and effective in treating sleep apnea6.  Results showed that the median AHI score decreased 68%, from 29.3 to 9.0. This therapy uses an implant system that senses breathing patterns and delivers mild stimulation to airway muscles, which keeps the airway open during sleep.  The system consists of three fully implanted components: a small generator (similar in size to a pacemaker), a breathing sensor, and a stimulation lead.  Using a small handheld remote, a patient turns the therapy on before bed and off upon awakening.    Candidates for this device must be greater than 18 years of age, have moderate to severe obstructive sleep apnea with less than 25% central events  (AHI between 15-65), BMI less than 35, have tried CPAP/oral appliance for at least 8 weeks without success, and have appropriate upper airway anatomy (determined by a sleep endoscopy performed by Dr. Stoney Quiros or Dr. Og Trevino).    Nasal Procedures:  Nasal obstruction can interfere with nasal breathing during the day and night.  Studies have shown that relief of nasal obstruction can improve the ability of some patients to tolerate positive airway pressure therapy for obstructive sleep apnea1.  Treatment options include medications such as nasal saline, topical corticosteroid and antihistamine sprays, and oral medications such as antihistamines or decongestants. Non-surgical treatments can include external nasal dilators for selected patients. If these are not successful by themselves, surgery can improve the nasal  airway either alone or in combination with these other options.        Combination Procedures:  Combination of surgical procedures and other treatments may be recommended, particularly if patients have more than one area of narrowing or persistent positional disease.  The success rate of combination surgery ranges from 66-80%2,3.    References  Shiv DORAN. The Role of the Nose in Snoring and Obstructive Sleep Apnoea: An Update.  Eur Arch Otorhinolaryngol. 2011; 268: 1365-73.   Ant SM; Syed JA; Salome JR; Pallanch JF; Bhavik MB; Bertha SG; Marisol SOLO. Surgical modifications of the upper airway for obstructive sleep apnea in adults: a systematic review and meta-analysis. SLEEP 2010;33(10):5175-8676. Sea BLACK. Hypopharyngeal surgery in obstructive sleep apnea: an evidence-based medicine review.  Arch Otolaryngol Head Neck Surg. 2006 Feb;132(2):206-13.  Anjum YH1, Fredi Y, Khoi PANDA. The efficacy of anatomically based multilevel surgery for obstructive sleep apnea. Otolaryngol Head Neck Surg. 2003 Oct;129(4):327-35.  Sea BLACK, Goldberg A. Hypopharyngeal Surgery in Obstructive Sleep Apnea: An Evidence-Based Medicine Review. Arch Otolaryngol Head Neck Surg. 2006 Feb;132(2):206-13.  Merry POLANCO et al. Upper-Airway Stimulation for Obstructive Sleep Apnea.  N Engl J Med. 2014 Jan 9;370(2):139-49.  Candice Y et al. Increased Incidence of Cardiovascular Disease in Middle-aged Men with Obstructive Sleep Apnea. Am J Respir Crit Care Med; 2002 166: 159-165  Santillan EM et al. Studying Life Effects and Effectiveness of Palatopharyngoplasty (SLEEP) study: Subjective Outcomes of Isolated Uvulopalatopharyngoplasty. Otolaryngol Head Neck Surg. 2011; 144: 623-631.        WHAT IF I ONLY HAVE SNORING?    Mandibular advancement devices, lateral sleep positioning, long-term weight loss and treatment of nasal allergies have been shown to improve snoring.  Exercising tongue muscles with a game  (https://[employer]/us/shannon/soundly-reduce-snoring/qv3878325176) or stimulating the tongue during the day with a device (https://doi.org/10.3390/tjx53144920) have improved snoring in some individuals.  https://www.[employer]/  https://www.sleepfoundation.org/best-anti-snoring-mouthpieces-and-mouthguards    Remember to Drive Safe... Drive Alive     Sleep health profoundly affects your health, mood, and your safety.  Thirty three percent of the population (one in three of us) is not getting enough sleep and many have a sleep disorder. Not getting enough sleep or having an untreated / undertreated sleep condition may make us sleepy without even knowing it. In fact, our driving could be dramatically impaired due to our sleep health. As your provider, here are some things I would like you to know about driving:     Here are some warning signs for impairment and dangerous drowsy driving:              -Having been awake more than 16 hours               -Looking tired               -Eyelid drooping              -Head nodding (it could be too late at this point)              -Driving for more than 30 minutes     Some things you could do to make the driving safer if you are experiencing some drowsiness:              -Stop driving and rest              -Call for transportation              -Make sure your sleep disorder is adequately treated     Some things that have been shown NOT to work when experiencing drowsiness while driving:              -Turning on the radio              -Opening windows              -Eating any  distracting  /  entertaining  foods (e.g., sunflower seeds, candy, or any other)              -Talking on the phone      Your decision may not only impact your life, but also the life of others. Please, remember to drive safe for yourself and all of us.           Your Body mass index is 35.58 kg/m .  Weight management is a personal decision.  If you are interested in exploring weight loss strategies,  the following discussion covers the approaches that may be successful. Body mass index (BMI) is one way to tell whether you are at a healthy weight, overweight, or obese. It measures your weight in relation to your height.  A BMI of 18.5 to 24.9 is in the healthy range. A person with a BMI of 25 to 29.9 is considered overweight, and someone with a BMI of 30 or greater is considered obese. More than two-thirds of American adults are considered overweight or obese.  Being overweight or obese increases the risk for further weight gain. Excess weight may lead to heart disease and diabetes.  Creating and following plans for healthy eating and physical activity may help you improve your health.  Weight control is part of healthy lifestyle and includes exercise, emotional health, and healthy eating habits. Careful eating habits lifelong are the mainstay of weight control. Though there are significant health benefits from weight loss, long-term weight loss with diet alone may be very difficult to achieve- studies show long-term success with dietary management in less than 10% of people. Attaining a healthy weight may be especially difficult to achieve in those with severe obesity. In some cases, medications, devices and surgical management might be considered.  What can you do?  If you are overweight or obese and are interested in methods for weight loss, you should discuss this with your provider.   Consider reducing daily calorie intake by 500 calories.   Keep a food journal.   Avoiding skipping meals, consider cutting portions instead.    Diet combined with exercise helps maintain muscle while optimizing fat loss. Strength training is particularly important for building and maintaining muscle mass. Exercise helps reduce stress, increase energy, and improves fitness. Increasing exercise without diet control, however, may not burn enough calories to loose weight.     Start walking three days a week 10-20 minutes at a  time  Work towards walking thirty minutes five days a week   Eventually, increase the speed of your walking for 1-2 minutes at time    In addition, we recommend that you review healthy lifestyles and methods for weight loss available through the National Institutes of Health patient information sites:  http://win.niddk.nih.gov/publications/index.htm    And look into health and wellness programs that may be available through your health insurance provider, employer, local community center, or [employer].

## 2024-05-19 ENCOUNTER — THERAPY VISIT (OUTPATIENT)
Dept: SLEEP MEDICINE | Facility: CLINIC | Age: 64
End: 2024-05-19
Payer: COMMERCIAL

## 2024-05-19 DIAGNOSIS — I10 ESSENTIAL HYPERTENSION: ICD-10-CM

## 2024-05-19 DIAGNOSIS — R53.83 MALAISE AND FATIGUE: ICD-10-CM

## 2024-05-19 DIAGNOSIS — R06.00 DYSPNEA AND RESPIRATORY ABNORMALITY: ICD-10-CM

## 2024-05-19 DIAGNOSIS — F33.1 MAJOR DEPRESSIVE DISORDER, RECURRENT EPISODE, MODERATE (H): ICD-10-CM

## 2024-05-19 DIAGNOSIS — R53.81 MALAISE AND FATIGUE: ICD-10-CM

## 2024-05-19 DIAGNOSIS — E66.01 CLASS 2 SEVERE OBESITY DUE TO EXCESS CALORIES WITH SERIOUS COMORBIDITY AND BODY MASS INDEX (BMI) OF 35.0 TO 35.9 IN ADULT (H): ICD-10-CM

## 2024-05-19 DIAGNOSIS — E66.812 CLASS 2 SEVERE OBESITY DUE TO EXCESS CALORIES WITH SERIOUS COMORBIDITY AND BODY MASS INDEX (BMI) OF 35.0 TO 35.9 IN ADULT (H): ICD-10-CM

## 2024-05-19 DIAGNOSIS — R06.89 DYSPNEA AND RESPIRATORY ABNORMALITY: ICD-10-CM

## 2024-05-19 DIAGNOSIS — Z72.820 LACK OF ADEQUATE SLEEP: ICD-10-CM

## 2024-05-19 PROCEDURE — 95810 POLYSOM 6/> YRS 4/> PARAM: CPT | Performed by: INTERNAL MEDICINE

## 2024-06-04 PROBLEM — E78.5 HYPERLIPIDEMIA LDL GOAL <100: Chronic | Status: ACTIVE | Noted: 2020-03-18

## 2024-06-04 PROBLEM — E11.9 TYPE 2 DIABETES MELLITUS (H): Chronic | Status: ACTIVE | Noted: 2024-06-04

## 2024-06-04 PROBLEM — G47.33 OSA (OBSTRUCTIVE SLEEP APNEA): Chronic | Status: ACTIVE | Noted: 2024-06-04

## 2024-06-04 NOTE — PROCEDURES
" SLEEP STUDY INTERPRETATION  DIAGNOSTIC POLYSOMNOGRAPHY REPORT      Patient: EWELINA PRINCE  YOB: 1960  Study Date: 5/19/2024  MRN: [id number]  Referring Provider: Su Stubbs MD  Ordering Provider: Carlton Jiang    Indications for Polysomnography: The patient is a 64 year old Male who is 5' 10\" and weighs 248.0 lbs. His BMI is 35.9, [place] sleepiness scale 5/24 and neck circumference is 46 cm.   A diagnostic polysomnogram was performed to evaluate for History of obstructive sleep apnea, severity unknown. Presents with non-refreshing sleep, daytime fatigue/sleepiness, crowded oropharynx, large neck size and co-morbid HTN and DM type 2. Snoring status is unknown.     Polysomnogram Data: A full night polysomnogram recorded the standard physiologic parameters including EEG, EOG, EMG, ECG, nasal and oral airflow. Respiratory parameters of chest and abdominal movements were recorded with respiratory inductance plethysmography. Oxygen saturation was recorded by pulse oximetry. Hypopnea scoring rule used: 1B 4%.    Sleep Architecture:    The total recording time of the polysomnogram was 444.5 minutes. The total sleep time was 285.5 minutes. Sleep latency was increased at 50.5 minutes without the use of a sleep aid. REM latency was 111.0 minutes. Arousal index was increased at 45.6 arousals per hour. Sleep efficiency was decreased at 64.2%. Wake after sleep onset was 108.5 minutes. The patient spent 8.4% of total sleep time in Stage N1, 29.2% in Stage N2, 32.4% in Stage N3, and 29.9% in REM. Time in REM supine was 35.5 minutes.    Respiration:   Events ? The polysomnogram revealed a presence of 19 obstructive, 2 central, and 0 mixed apneas resulting in an apnea index of 4.4 events per hour. There were 98 obstructive hypopneas and 0 central hypopneas resulting in an obstructive hypopnea index of 20.6 and central hypopnea index of 0 events per hour. The combined apnea/hypopnea index was 25.0 " events per hour (central apnea/hypopnea index was 0.4 events per hour). The REM AHI was 28.8 events per hour. The supine AHI was 37.0 events per hour. The RERA index was 9.2 events per hour.  The RDI was 34.3 events per hour.  Snoring - was reported as mild.  Respiratory rate and pattern - was notable for normal respiratory rate and pattern.  Sustained Sleep Associated Hypoventilation - Transcutaneous carbon dioxide monitoring was not used, however significant hypoventilation was not suggested by oximetry   Sleep Associated Hypoxemia - (Greater than 5 minutes O2 sat at or below 88%) Baseline oxygen saturation was 93.4%. Lowest oxygen saturation was 85.0%. Time spent less than or equal to 88% was 4.5 minutes. Time spent less than or equal to 89% was 8.6 minutes.    Movement Activity:   Periodic Limb Activity - There were 68 PLMs during the entire study. The PLM index was 14.3 movements per hour. The PLM Arousal Index was 3.4 per hour.  REM EMG Activity - Excessive transient/sustained muscle activity was likely present but difficult to quantify due to large number of REM-related breathing events   Nocturnal Behavior - Abnormal sleep related behaviors were not noted   Bruxism - None apparent.        Cardiac Summary:    The average pulse rate was 67.1 bpm. The minimum pulse rate was 61.0 bpm while the maximum pulse rate was 80.0 bpm.  Arrhythmias were not noted.      Assessment:   Moderate obstructive sleep apnea    Recommendations:  Consider repeat polysomnography with full night titration of positive airway pressure therapy for the control of sleep disordered breathing.  Based on the presence of moderate obstructive sleep apnea and excessive daytime sleepiness, treatment could be empirically initiated with Auto?titrating PAP therapy with a range of 5 to 15 cmH2O. Recommend clinical follow up with sleep management team.  Patient may be a candidate for dental appliance through referral to Sleep Dentistry for the  treatment of obstructive sleep apnea and/or socially disruptive snoring.  If devices are not acceptable or effective, patient may benefit from evaluation of possible surgical options. If he is interested, would recommend referral to specialized ENT-Sleep provider.  Advice regarding the risks of drowsy driving.  Suggest optimizing sleep schedule    Weight management (if BMI > 30).  Pharmacologic therapy should be used for management of restless legs syndrome only if present and clinically indicated and not based on the presence of periodic limb movements alone.    Diagnostic Codes:   Obstructive Sleep Apnea G47.33         _____________________________________   Electronically Signed By: Truong Villanueva MD 6/4/24           Range(%) Time in range (min)   0.0 - 89.0 8.6   0.0 - 88.0 4.5         Stage Min(mm Hg) Max(mm Hg)   Wake - -   NREM(1+2+3) - -   REM - -       Range(mmHg) Time in range (min)   55.0 - 100.0 -   Excluded data <20.0 & >65.0 445.0

## 2024-06-05 LAB — SLPCOMP: NORMAL

## 2024-06-20 ENCOUNTER — OFFICE VISIT (OUTPATIENT)
Dept: DERMATOLOGY | Facility: CLINIC | Age: 64
End: 2024-06-20
Attending: DERMATOLOGY
Payer: COMMERCIAL

## 2024-06-20 DIAGNOSIS — R21 RASH: Primary | ICD-10-CM

## 2024-06-20 DIAGNOSIS — L57.0 AK (ACTINIC KERATOSIS): ICD-10-CM

## 2024-06-20 PROCEDURE — 99213 OFFICE O/P EST LOW 20 MIN: CPT | Mod: 25 | Performed by: DERMATOLOGY

## 2024-06-20 PROCEDURE — 17000 DESTRUCT PREMALG LESION: CPT | Mod: XS | Performed by: DERMATOLOGY

## 2024-06-20 RX ORDER — TRIAMCINOLONE ACETONIDE 1 MG/G
OINTMENT TOPICAL 2 TIMES DAILY
Qty: 80 G | Refills: 1 | Status: SHIPPED | OUTPATIENT
Start: 2024-06-20

## 2024-06-20 ASSESSMENT — PAIN SCALES - GENERAL: PAINLEVEL: NO PAIN (0)
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
## 2024-06-20 NOTE — NURSING NOTE
Dermatology Rooming Note    Christopher Rodriguez's goals for this visit include:   Chief Complaint   Patient presents with    Derm Problem     Spot on scalp follow up- about the same     BETSY Alonzo

## 2024-06-29 PROBLEM — L57.0 AK (ACTINIC KERATOSIS): Status: ACTIVE | Noted: 2024-06-29

## 2024-06-29 PROBLEM — R21 RASH: Status: ACTIVE | Noted: 2024-06-29

## 2024-07-02 ENCOUNTER — TELEPHONE (OUTPATIENT)
Dept: DERMATOLOGY | Facility: CLINIC | Age: 64
End: 2024-07-02
Payer: COMMERCIAL

## 2024-07-02 NOTE — TELEPHONE ENCOUNTER
Left Voicemail (1st Attempt) and Sent Mychart (1st Attempt) for the patient to call back and schedule the following:    Appointment type: FOLLOW UP    Provider: pedro    Return date: 12/2024     Specialty phone number: [phone]    Additonal Notes: na

## 2024-07-05 ENCOUNTER — TELEPHONE (OUTPATIENT)
Dept: DERMATOLOGY | Facility: CLINIC | Age: 64
End: 2024-07-05
Payer: COMMERCIAL

## 2024-07-07 DIAGNOSIS — G47.33 OSA (OBSTRUCTIVE SLEEP APNEA): Primary | ICD-10-CM

## 2024-07-14 ENCOUNTER — HEALTH MAINTENANCE LETTER (OUTPATIENT)
Age: 64
End: 2024-07-14

## 2024-07-16 DIAGNOSIS — E11.42 TYPE 2 DIABETES MELLITUS WITH DIABETIC POLYNEUROPATHY, WITHOUT LONG-TERM CURRENT USE OF INSULIN (H): ICD-10-CM

## 2024-07-16 RX ORDER — DULAGLUTIDE 4.5 MG/.5ML
INJECTION, SOLUTION SUBCUTANEOUS
Qty: 6 ML | Refills: 0 | Status: SHIPPED | OUTPATIENT
Start: 2024-07-16

## 2024-07-16 NOTE — TELEPHONE ENCOUNTER
RN spoke with pt and relayed provider message. Pt verbalized understanding, no questions.     Mansi Figueroa RN

## 2024-07-16 NOTE — TELEPHONE ENCOUNTER
Called pt and scheduled virtual visit for 8/14/24      Pt states she has 3.0 shots (2 months worth) that is left over before they raised dosage to 4.5. can pt get a 1.5 supply for the 2 month supply of 3.0 dosage that he has so he can use the medication?

## 2024-07-16 NOTE — TELEPHONE ENCOUNTER
I wouldn't recommend giving more for now.  We can discuss this at upcoming visit.    Su Stubbs MD

## 2024-08-13 DIAGNOSIS — R39.15 URINARY URGENCY: ICD-10-CM

## 2024-08-13 RX ORDER — TOLTERODINE 4 MG/1
4 CAPSULE, EXTENDED RELEASE ORAL DAILY
Qty: 90 CAPSULE | Refills: 0 | Status: SHIPPED | OUTPATIENT
Start: 2024-08-13

## 2024-08-14 ENCOUNTER — VIRTUAL VISIT (OUTPATIENT)
Dept: FAMILY MEDICINE | Facility: CLINIC | Age: 64
End: 2024-08-14
Payer: COMMERCIAL

## 2024-08-14 DIAGNOSIS — Z53.9 NO SHOW: Primary | ICD-10-CM

## 2024-08-14 PROCEDURE — 99207 PR NO CHARGE LOS: CPT | Mod: 93 | Performed by: FAMILY MEDICINE

## 2024-08-14 RX ORDER — GLYBURIDE 5 MG/1
10 TABLET ORAL 2 TIMES DAILY WITH MEALS
Qty: 360 TABLET | Refills: 1 | Status: CANCELLED | OUTPATIENT
Start: 2024-08-14

## 2024-08-14 ASSESSMENT — PATIENT HEALTH QUESTIONNAIRE - PHQ9
SUM OF ALL RESPONSES TO PHQ QUESTIONS 1-9: 9
10. IF YOU CHECKED OFF ANY PROBLEMS, HOW DIFFICULT HAVE THESE PROBLEMS MADE IT FOR YOU TO DO YOUR WORK, TAKE CARE OF THINGS AT HOME, OR GET ALONG WITH OTHER PEOPLE: SOMEWHAT DIFFICULT
SUM OF ALL RESPONSES TO PHQ QUESTIONS 1-9: 9

## 2024-08-14 NOTE — PROGRESS NOTES
Called x 3 without answer.    Su Stubbs MD       Christopher is a 64 year old who is being evaluated via a billable telephone visit.    What phone number would you like to be contacted at? [phone]  How would you like to obtain your AVS? MyChart  Originating Location (pt. Location): Home    Distant Location (provider location):  On-site        Subjective   Christopher is a 64 year old, presenting for the following health issues:  Diabetes and Depression      8/14/2024     3:09 PM   Additional Questions   Roomed by Leann   Accompanied by self     History of Present Illness       Mental Health Follow-up:  Patient presents to follow-up on Depression.Patient's depression since last visit has been:  No change  The patient is not having other symptoms associated with depression.      Any significant life events: No  Patient is not feeling anxious or having panic attacks.  Patient has no concerns about alcohol or drug use.    Diabetes:   He presents for follow up of diabetes.  He is checking home blood glucose a few times a week.   He checks blood glucose before meals.  Blood glucose is sometimes over 200 and never under 70. He is aware of hypoglycemia symptoms including shakiness and weakness.    He has no concerns regarding his diabetes at this time.   He is not experiencing numbness or burning in feet, excessive thirst, blurry vision, weight changes or redness, sores or blisters on feet. The patient has had a diabetic eye exam in the last 12 months. Eye exam performed on 2023. Location of last eye exam [facility].        He eats 0-1 servings of fruits and vegetables daily.He consumes 1 sweetened beverage(s) daily.He exercises with enough effort to increase his heart rate 9 or less minutes per day.  He exercises with enough effort to increase his heart rate 3 or less days per week.   He is taking medications regularly.                 Objective           Vitals:  No vitals were obtained today due to virtual  "visit.    Physical Exam   General: Alert and no distress //Respiratory: No audible wheeze, cough, or shortness of breath // Psychiatric:  Appropriate affect, tone, and pace of words            Phone call duration:  minutes  Signed Electronically by: Su Stubbs MD        If patient has telephone visit, have they been educated on video visit as preferred visit method and offered to change to video visit? yes        Instructions Relayed to Patient by Virtual Roomer:     Patient is active on [employer]:   Relayed following to patient: \"It looks like you are active on [employer], are you able to join the visit this way? If not, do you need us to send you a link now or would you like your provider to send a link via text or email when they are ready to initiate the visit?\"      Patient Confirmed they will join visit via: Provider to call patient for telephone visit   Reminded patient to ensure they were logged on to virtual visit by arrival time listed.   Asked if patient has flexibility to initiate visit sooner than arrival time: patient stated yes, documented in appointment notes availability to initiate visit earlier than arrival time     If pediatric virtual visit, ensured pediatric patient along with parent/guardian will be present for video visit.     Patient offered the website [email or web address] and/or phone number to [employer] Help line: [phone]   "

## 2024-09-13 ENCOUNTER — TELEPHONE (OUTPATIENT)
Dept: FAMILY MEDICINE | Facility: CLINIC | Age: 64
End: 2024-09-13
Payer: COMMERCIAL

## 2024-09-13 NOTE — TELEPHONE ENCOUNTER
Patient Quality Outreach    Patient is due for the following:   Diabetes -  A1C and Foot Exam  Depression  -  PHQ-9 needed  Physical Preventive Adult Physical    Next Steps:   Schedule a Adult Preventative    Type of outreach:    Sent [employer] message.    Next Steps:  Reach out within 90 days via [employer].    Max number of attempts reached: Yes. Will try again in 90 days if patient still on fail list.    Questions for provider review:    None           Kailee Lacey MA

## 2024-10-20 DIAGNOSIS — F32.4 MAJOR DEPRESSIVE DISORDER WITH SINGLE EPISODE, IN PARTIAL REMISSION (H): ICD-10-CM

## 2024-10-20 DIAGNOSIS — E11.42 TYPE 2 DIABETES MELLITUS WITH DIABETIC POLYNEUROPATHY, WITHOUT LONG-TERM CURRENT USE OF INSULIN (H): ICD-10-CM

## 2024-10-21 RX ORDER — GLYBURIDE 5 MG/1
10 TABLET ORAL 2 TIMES DAILY WITH MEALS
Qty: 120 TABLET | Refills: 0 | Status: SHIPPED | OUTPATIENT
Start: 2024-10-21

## 2024-10-21 RX ORDER — DESVENLAFAXINE 100 MG/1
100 TABLET, EXTENDED RELEASE ORAL DAILY
Qty: 30 TABLET | Refills: 0 | Status: SHIPPED | OUTPATIENT
Start: 2024-10-21

## 2024-10-21 RX ORDER — BUPROPION HYDROCHLORIDE 300 MG/1
300 TABLET ORAL EVERY MORNING
Qty: 30 TABLET | Refills: 0 | Status: SHIPPED | OUTPATIENT
Start: 2024-10-21

## 2024-10-21 RX ORDER — DULAGLUTIDE 4.5 MG/.5ML
INJECTION, SOLUTION SUBCUTANEOUS
Qty: 2 ML | Refills: 0 | Status: SHIPPED | OUTPATIENT
Start: 2024-10-21

## 2024-11-19 DIAGNOSIS — E11.42 TYPE 2 DIABETES MELLITUS WITH DIABETIC POLYNEUROPATHY, WITHOUT LONG-TERM CURRENT USE OF INSULIN (H): ICD-10-CM

## 2024-11-19 NOTE — TELEPHONE ENCOUNTER
Called patient to schedule appointment.with PCP. Dr. Phil Stubbs booked out until March. Patient declines appt request and scheduled with moises on 12/06/2024.    Cyndy Reed

## 2024-11-20 ENCOUNTER — DOCUMENTATION ONLY (OUTPATIENT)
Dept: FAMILY MEDICINE | Facility: CLINIC | Age: 64
End: 2024-11-20

## 2024-11-20 DIAGNOSIS — E11.42 TYPE 2 DIABETES MELLITUS WITH DIABETIC POLYNEUROPATHY, WITHOUT LONG-TERM CURRENT USE OF INSULIN (H): Primary | ICD-10-CM

## 2024-11-20 DIAGNOSIS — E11.42 TYPE 2 DIABETES MELLITUS WITH DIABETIC POLYNEUROPATHY, WITHOUT LONG-TERM CURRENT USE OF INSULIN (H): ICD-10-CM

## 2024-11-20 NOTE — PROGRESS NOTES
Christopher Rodriguez has an upcoming lab appointment:    Future Appointments   Date Time Provider Department Center   11/20/2024 10:45 AM [facility]   12/5/2024  3:00 PM Cici Santos APRN CNP [facility]     Patient is scheduled for the following lab(s): pended    There is no order available. Please review and place either future orders or HMPO (Review of Health Maintenance Protocol Orders), as appropriate.    Health Maintenance Due   Topic    A1C     ANNUAL REVIEW OF HM ORDERS      Angela Barrios

## 2024-11-21 RX ORDER — DULAGLUTIDE 4.5 MG/.5ML
INJECTION, SOLUTION SUBCUTANEOUS
Qty: 2 ML | Refills: 0 | Status: SHIPPED | OUTPATIENT
Start: 2024-11-21

## 2024-12-01 ENCOUNTER — HEALTH MAINTENANCE LETTER (OUTPATIENT)
Age: 64
End: 2024-12-01

## 2024-12-01 DIAGNOSIS — F32.4 MAJOR DEPRESSIVE DISORDER WITH SINGLE EPISODE, IN PARTIAL REMISSION (H): ICD-10-CM

## 2024-12-02 RX ORDER — DESVENLAFAXINE 100 MG/1
100 TABLET, EXTENDED RELEASE ORAL DAILY
Qty: 30 TABLET | Refills: 0 | Status: SHIPPED | OUTPATIENT
Start: 2024-12-02 | End: 2024-12-05

## 2024-12-02 RX ORDER — BUPROPION HYDROCHLORIDE 300 MG/1
300 TABLET ORAL EVERY MORNING
Qty: 30 TABLET | Refills: 0 | Status: SHIPPED | OUTPATIENT
Start: 2024-12-02 | End: 2024-12-05

## 2024-12-04 ENCOUNTER — TELEPHONE (OUTPATIENT)
Dept: UROLOGY | Facility: CLINIC | Age: 64
End: 2024-12-04
Payer: COMMERCIAL

## 2024-12-04 NOTE — TELEPHONE ENCOUNTER
[facility] Call Center    Phone Message    May a detailed message be left on voicemail: yes     Reason for Call: Medication Refill Request    Has the patient contacted the pharmacy for the refill? Yes   Name of medication being requested: tolterodine ER (DETROL LA) 4 MG 24 hr capsule  Provider who prescribed the medication: Dr. Meek  Pharmacy: [id number] [address] [facility]  Date medication is needed: ASAP, patient has been out for the past few weeks.    Action Taken: Other: FK - Urology    Travel Screening: Not Applicable     Date of Service:

## 2024-12-04 NOTE — CONFIDENTIAL NOTE
Writer called and spoke with pt about needing an appt. Cannot refill until an appt is made. He states he is on the other line and will call back writer directly.    Stephany CORTEZ RN   [facility], Urology   12/4/2024 1:44 PM

## 2024-12-05 ENCOUNTER — OFFICE VISIT (OUTPATIENT)
Dept: FAMILY MEDICINE | Facility: CLINIC | Age: 64
End: 2024-12-05
Payer: COMMERCIAL

## 2024-12-05 VITALS
OXYGEN SATURATION: 98 % | BODY MASS INDEX: 34.33 KG/M2 | SYSTOLIC BLOOD PRESSURE: 125 MMHG | TEMPERATURE: 97.9 F | DIASTOLIC BLOOD PRESSURE: 85 MMHG | WEIGHT: 239.8 LBS | RESPIRATION RATE: 22 BRPM | HEART RATE: 97 BPM | HEIGHT: 70 IN

## 2024-12-05 DIAGNOSIS — F32.4 MAJOR DEPRESSIVE DISORDER WITH SINGLE EPISODE, IN PARTIAL REMISSION (H): ICD-10-CM

## 2024-12-05 DIAGNOSIS — E11.65 TYPE 2 DIABETES MELLITUS WITH HYPERGLYCEMIA, WITH LONG-TERM CURRENT USE OF INSULIN (H): ICD-10-CM

## 2024-12-05 DIAGNOSIS — Z79.4 TYPE 2 DIABETES MELLITUS WITH HYPERGLYCEMIA, WITH LONG-TERM CURRENT USE OF INSULIN (H): ICD-10-CM

## 2024-12-05 DIAGNOSIS — Z00.00 ROUTINE GENERAL MEDICAL EXAMINATION AT A HEALTH CARE FACILITY: Primary | ICD-10-CM

## 2024-12-05 DIAGNOSIS — E78.5 HYPERLIPIDEMIA LDL GOAL <100: ICD-10-CM

## 2024-12-05 DIAGNOSIS — I10 HYPERTENSION GOAL BP (BLOOD PRESSURE) < 140/90: ICD-10-CM

## 2024-12-05 DIAGNOSIS — Z28.20 VACCINE REFUSED BY PATIENT: ICD-10-CM

## 2024-12-05 DIAGNOSIS — E11.42 TYPE 2 DIABETES MELLITUS WITH DIABETIC POLYNEUROPATHY, WITHOUT LONG-TERM CURRENT USE OF INSULIN (H): ICD-10-CM

## 2024-12-05 DIAGNOSIS — Z23 NEED FOR PNEUMOCOCCAL VACCINATION: ICD-10-CM

## 2024-12-05 DIAGNOSIS — R39.15 URINARY URGENCY: ICD-10-CM

## 2024-12-05 LAB
EST. AVERAGE GLUCOSE BLD GHB EST-MCNC: 255 MG/DL
HBA1C MFR BLD: 10.5 % (ref 0–5.6)

## 2024-12-05 RX ORDER — TOLTERODINE 4 MG/1
4 CAPSULE, EXTENDED RELEASE ORAL DAILY
Qty: 90 CAPSULE | Refills: 0 | Status: SHIPPED | OUTPATIENT
Start: 2024-12-05

## 2024-12-05 RX ORDER — LOSARTAN POTASSIUM 100 MG/1
100 TABLET ORAL DAILY
Qty: 90 TABLET | Refills: 3 | Status: SHIPPED | OUTPATIENT
Start: 2024-12-05

## 2024-12-05 RX ORDER — DULAGLUTIDE 4.5 MG/.5ML
4.5 INJECTION, SOLUTION SUBCUTANEOUS WEEKLY
Qty: 6 ML | Refills: 0 | Status: SHIPPED | OUTPATIENT
Start: 2024-12-05

## 2024-12-05 RX ORDER — ATORVASTATIN CALCIUM 40 MG/1
40 TABLET, FILM COATED ORAL EVERY MORNING
Qty: 90 TABLET | Refills: 2 | Status: SHIPPED | OUTPATIENT
Start: 2024-12-05

## 2024-12-05 RX ORDER — DESVENLAFAXINE 100 MG/1
100 TABLET, EXTENDED RELEASE ORAL DAILY
Qty: 90 TABLET | Refills: 1 | Status: SHIPPED | OUTPATIENT
Start: 2024-12-05

## 2024-12-05 RX ORDER — BUPROPION HYDROCHLORIDE 300 MG/1
300 TABLET ORAL EVERY MORNING
Qty: 90 TABLET | Refills: 1 | Status: SHIPPED | OUTPATIENT
Start: 2024-12-05

## 2024-12-05 RX ORDER — GLYBURIDE 5 MG/1
10 TABLET ORAL 2 TIMES DAILY WITH MEALS
Qty: 360 TABLET | Refills: 1 | Status: SHIPPED | OUTPATIENT
Start: 2024-12-05

## 2024-12-05 SDOH — HEALTH STABILITY: PHYSICAL HEALTH: ON AVERAGE, HOW MANY DAYS PER WEEK DO YOU ENGAGE IN MODERATE TO STRENUOUS EXERCISE (LIKE A BRISK WALK)?: 4 DAYS

## 2024-12-05 SDOH — HEALTH STABILITY: PHYSICAL HEALTH: ON AVERAGE, HOW MANY MINUTES DO YOU ENGAGE IN EXERCISE AT THIS LEVEL?: 50 MIN

## 2024-12-05 ASSESSMENT — PATIENT HEALTH QUESTIONNAIRE - PHQ9
10. IF YOU CHECKED OFF ANY PROBLEMS, HOW DIFFICULT HAVE THESE PROBLEMS MADE IT FOR YOU TO DO YOUR WORK, TAKE CARE OF THINGS AT HOME, OR GET ALONG WITH OTHER PEOPLE: SOMEWHAT DIFFICULT
SUM OF ALL RESPONSES TO PHQ QUESTIONS 1-9: 8
SUM OF ALL RESPONSES TO PHQ QUESTIONS 1-9: 8

## 2024-12-05 ASSESSMENT — PAIN SCALES - GENERAL: PAINLEVEL_OUTOF10: NO PAIN (0)

## 2024-12-05 ASSESSMENT — SOCIAL DETERMINANTS OF HEALTH (SDOH): HOW OFTEN DO YOU GET TOGETHER WITH FRIENDS OR RELATIVES?: ONCE A WEEK

## 2024-12-05 NOTE — PROGRESS NOTES
Preventive Care Visit  [facility]  RABIA Corley CNP, Family Medicine  Dec 5, 2024      Assessment & Plan     Routine general medical examination at a health care facility  - REVIEW OF HEALTH MAINTENANCE PROTOCOL ORDERS  - PRIMARY CARE FOLLOW-UP SCHEDULING; Future    Type 2 diabetes mellitus with hyperglycemia, with long-term current use of insulin (H)  Has missed several doses of his meds.  Diabetes poorly controlled. Restart all medications, and he will need to follow back in 3 months to recheck his A1c is back on all his medications.  Increase dietary efforts and physical activity.   - OPTOMETRY REFERRAL; Future  - Comprehensive metabolic panel (BMP + Alb, Alk Phos, ALT, AST, Total. Bili, TP); Future  - Hemoglobin A1c; Future  - glyBURIDE (DIABETA /MICRONASE) 5 MG tablet; Take 2 tablets (10 mg) by mouth 2 times daily (with meals).  - metFORMIN (GLUCOPHAGE) 1000 MG tablet; Take 1 tablet (1,000 mg) by mouth 2 times daily (with meals).  - Dulaglutide (TRULICITY) 4.5 MG/0.5ML SOAJ; Inject 4.5 mg as directed once a week.    Hypertension goal BP (blood pressure) < 140/90  He reports good control.  Continue with dietary and activity efforts.  - losartan (COZAAR) 100 MG tablet; Take 1 tablet (100 mg) by mouth daily.    Major depressive disorder with single episode, in partial remission (H)  Patient reports that his symptoms are not very well managed, still struggling with depressive symptoms and symptoms irritability.  He would like to follow-up with psychiatry, referral placed.  Denies suicidal ideation.  - buPROPion (WELLBUTRIN XL) 300 MG 24 hr tablet; Take 1 tablet (300 mg) by mouth every morning.  - desvenlafaxine (PRISTIQ) 100 MG 24 hr tablet; Take 1 tablet (100 mg) by mouth daily.  - Adult Mental Health  Referral; Future    Urinary urgency  Following with urology, he is supposed to be on Flomax as well for BPH symptoms.  Patient stopped taking due to side effects.  He  "has an upcoming appointment with urology, I will give him a one-time refill of his Detrol.  - tolterodine ER (DETROL LA) 4 MG 24 hr capsule; Take 1 capsule (4 mg) by mouth daily. Please call [phone] to schedule an appointment    Hyperlipidemia LDL goal <100  - atorvastatin (LIPITOR) 40 MG tablet; Take 1 tablet (40 mg) by mouth every morning.    Need for pneumococcal vaccination  - Pneumococcal 20 Valent Conjugate (Prevnar 20)    Vaccine refused by patient  -He declined influenza, COVID and RSV vaccines.    Patient has been advised of split billing requirements and indicates understanding: Yes    BMI  Estimated body mass index is 34.41 kg/m  as calculated from the following:    Height as of this encounter: 1.778 m (5' 10\").    Weight as of this encounter: 108.8 kg (239 lb 12.8 oz).       Counseling  Appropriate preventive services were addressed with this patient via screening, questionnaire, or discussion as appropriate for fall prevention, nutrition, physical activity, Tobacco-use cessation, social engagement, weight loss and cognition.  Checklist reviewing preventive services available has been given to the patient.  Reviewed patient's diet, addressing concerns and/or questions.   He is at risk for psychosocial distress and has been provided with information to reduce risk.   The patient's PHQ-9 score is consistent with mild depression. He was provided with information regarding depression.           Chan White is a 64 year old, presenting for the following:  Physical (Also med check)        12/5/2024     2:40 PM   Additional Questions   Roomed by palma   Accompanied by self         12/5/2024     2:40 PM   Patient Reported Additional Medications   Patient reports taking the following new medications no       Via the Health Maintenance questionnaire, the patient has reported the following services have been completed -Eye Exam: [facility] 2023-01-24, this information has been sent to the abstraction " team.    HPI  Presents for an annual physical, discuss referral to a psychiatrist.        Health Care Directive  Patient does not have a Health Care Directive: Discussed advance care planning with patient; however, patient declined at this time.      12/5/2024   General Health   How would you rate your overall physical health? (!) FAIR   Feel stress (tense, anxious, or unable to sleep) To some extent      (!) STRESS CONCERN      12/5/2024   Nutrition   Three or more servings of calcium each day? (!) NO   Diet: Regular (no restrictions)   How many servings of fruit and vegetables per day? (!) 2-3   How many sweetened beverages each day? 0-1            12/5/2024   Exercise   Days per week of moderate/strenous exercise 4 days   Average minutes spent exercising at this level 50 min            12/5/2024   Social Factors   Frequency of gathering with friends or relatives Once a week   Worry food won't last until get money to buy more No   Food not last or not have enough money for food? No   Do you have housing? (Housing is defined as stable permanent housing and does not include staying ouside in a car, in a tent, in an abandoned building, in an overnight shelter, or couch-surfing.) Yes   Are you worried about losing your housing? No   Lack of transportation? No   Unable to get utilities (heat,electricity)? No            12/5/2024   Fall Risk   Fallen 2 or more times in the past year? Yes    Trouble with walking or balance? Yes        Patient-reported           12/5/2024   Dental   Dentist two times every year? Yes            12/5/2024   TB Screening   Were you born outside of the [place]? Yes          Today's PHQ-9 Score:       12/5/2024     2:34 PM   PHQ-9 SCORE   PHQ-9 Total Score MyChart 8 (Mild depression)   PHQ-9 Total Score 8        Patient-reported         12/5/2024   Substance Use   Alcohol more than 3/day or more than 7/wk No   Do you use any other substances recreationally? (!) CANNABIS PRODUCTS        Social  "History     Tobacco Use    Smoking status: Never    Smokeless tobacco: Never   Vaping Use    Vaping status: Every Day   Substance Use Topics    Alcohol use: Yes     Comment: sometimes     Drug use: Never           12/5/2024   STI Screening   New sexual partner(s) since last STI/HIV test? No      Last PSA:   Prostate Specific Antigen Screen   Date Value Ref Range Status   04/07/2023 0.66 0.00 - 4.00 ug/L Final     ASCVD Risk   The 10-year ASCVD risk score (Betito TILLMAN, et al., 2019) is: 25.2%    Values used to calculate the score:      Age: 64 years      Sex: Male      Is Non- : No      Diabetic: Yes      Tobacco smoker: No      Systolic Blood Pressure: 125 mmHg      Is BP treated: Yes      HDL Cholesterol: 37 mg/dL      Total Cholesterol: 169 mg/dL           Reviewed and updated as needed this visit by Provider                          Review of Systems  Constitutional, HEENT, cardiovascular, pulmonary, GI, , musculoskeletal, neuro, skin, endocrine and psych systems are negative, except as otherwise noted.     Objective    Exam  /85 (BP Location: Left arm, Patient Position: Sitting, Cuff Size: Adult Large)   Pulse 97   Temp 97.9  F (36.6  C) (Oral)   Resp 22   Ht 1.778 m (5' 10\")   Wt 108.8 kg (239 lb 12.8 oz)   SpO2 98%   BMI 34.41 kg/m     Estimated body mass index is 34.41 kg/m  as calculated from the following:    Height as of this encounter: 1.778 m (5' 10\").    Weight as of this encounter: 108.8 kg (239 lb 12.8 oz).    Physical Exam  GENERAL: alert and no distress  EYES: Eyes grossly normal to inspection, PERRL and conjunctivae and sclerae normal  HENT: ear canals and TM's normal, nose and mouth without ulcers or lesions  NECK: no adenopathy, no asymmetry, masses, or scars  RESP: lungs clear to auscultation - no rales, rhonchi or wheezes  CV: regular rate and rhythm, normal S1 S2, no S3 or S4, no murmur, click or rub, no peripheral edema  ABDOMEN: soft, nontender, " no hepatosplenomegaly, no masses and bowel sounds normal  MS: no gross musculoskeletal defects noted, no edema  SKIN: no suspicious lesions or rashes  NEURO: Normal strength and tone, mentation intact and speech normal  PSYCH: mentation appears normal, affect normal/bright  Diabetic foot exam: normal DP and PT pulses, no trophic changes or ulcerative lesions, and normal sensory exam    Signed Electronically by: RABIA Corley CNP    Answers submitted by the patient for this visit:  Patient Health Questionnaire (Submitted on 12/5/2024)  If you checked off any problems, how difficult have these problems made it for you to do your work, take care of things at home, or get along with other people?: Somewhat difficult  PHQ9 TOTAL SCORE: 8

## 2024-12-05 NOTE — PATIENT INSTRUCTIONS
Patient Education   Preventive Care Advice   This is general advice given by our system to help you stay healthy. However, your care team may have specific advice just for you. Please talk to your care team about your preventive care needs.  Nutrition  Eat 5 or more servings of fruits and vegetables each day.  Try wheat bread, brown rice and whole grain pasta (instead of white bread, rice, and pasta).  Get enough calcium and vitamin D. Check the label on foods and aim for 100% of the RDA (recommended daily allowance).  Lifestyle  Exercise at least 150 minutes each week  (30 minutes a day, 5 days a week).  Do muscle strengthening activities 2 days a week. These help control your weight and prevent disease.  No smoking.  Wear sunscreen to prevent skin cancer.  Have a dental exam and cleaning every 6 months.  Yearly exams  See your health care team every year to talk about:  Any changes in your health.  Any medicines your care team has prescribed.  Preventive care, family planning, and ways to prevent chronic diseases.  Shots (vaccines)   HPV shots (up to age 26), if you've never had them before.  Hepatitis B shots (up to age 59), if you've never had them before.  COVID-19 shot: Get this shot when it's due.  Flu shot: Get a flu shot every year.  Tetanus shot: Get a tetanus shot every 10 years.  Pneumococcal, hepatitis A, and RSV shots: Ask your care team if you need these based on your risk.  Shingles shot (for age 50 and up)  General health tests  Diabetes screening:  Starting at age 35, Get screened for diabetes at least every 3 years.  If you are younger than age 35, ask your care team if you should be screened for diabetes.  Cholesterol test: At age 39, start having a cholesterol test every 5 years, or more often if advised.  Bone density scan (DEXA): At age 50, ask your care team if you should have this scan for osteoporosis (brittle bones).  Hepatitis C: Get tested at least once in your life.  STIs (sexually  transmitted infections)  Before age 24: Ask your care team if you should be screened for STIs.  After age 24: Get screened for STIs if you're at risk. You are at risk for STIs (including HIV) if:  You are sexually active with more than one person.  You don't use condoms every time.  You or a partner was diagnosed with a sexually transmitted infection.  If you are at risk for HIV, ask about PrEP medicine to prevent HIV.  Get tested for HIV at least once in your life, whether you are at risk for HIV or not.  Cancer screening tests  Cervical cancer screening: If you have a cervix, begin getting regular cervical cancer screening tests starting at age 21.  Breast cancer scan (mammogram): If you've ever had breasts, begin having regular mammograms starting at age 40. This is a scan to check for breast cancer.  Colon cancer screening: It is important to start screening for colon cancer at age 45.  Have a colonoscopy test every 10 years (or more often if you're at risk) Or, ask your provider about stool tests like a FIT test every year or Cologuard test every 3 years.  To learn more about your testing options, visit:   .  For help making a decision, visit:   https://bit.ly/mp70990.  Prostate cancer screening test: If you have a prostate, ask your care team if a prostate cancer screening test (PSA) at age 55 is right for you.  Lung cancer screening: If you are a current or former smoker ages 50 to 80, ask your care team if ongoing lung cancer screenings are right for you.  For informational purposes only. Not to replace the advice of your health care provider. Copyright   2023 [employer]. All rights reserved. Clinically reviewed by the [facility]. [facility] [id number] - REV 01/24.

## 2024-12-05 NOTE — NURSING NOTE
Prior to immunization administration, verified patients identity using patient s name and date of birth. Please see Immunization Activity for additional information.     Screening Questionnaire for Adult Immunization    Are you sick today?   No   Do you have allergies to medications, food, a vaccine component or latex?   No   Have you ever had a serious reaction after receiving a vaccination?   No   Do you have a long-term health problem with heart, lung, kidney, or metabolic disease (e.g., diabetes), asthma, a blood disorder, no spleen, complement component deficiency, a cochlear implant, or a spinal fluid leak?  Are you on long-term aspirin therapy?   Yes   Do you have cancer, leukemia, HIV/AIDS, or any other immune system problem?   No   Do you have a parent, brother, or sister with an immune system problem?   No   In the past 3 months, have you taken medications that affect  your immune system, such as prednisone, other steroids, or anticancer drugs; drugs for the treatment of rheumatoid arthritis, Crohn s disease, or psoriasis; or have you had radiation treatments?   No   Have you had a seizure, or a brain or other nervous system problem?   No   During the past year, have you received a transfusion of blood or blood    products, or been given immune (gamma) globulin or antiviral drug?   No   For women: Are you pregnant or is there a chance you could become       pregnant during the next month?   No   Have you received any vaccinations in the past 4 weeks?   No     Immunization questionnaire was positive for at least one answer.  Notified PROVIDER AWARE.      Patient instructed to remain in clinic for 15 minutes afterwards, and to report any adverse reactions.     Screening performed by Karma Gloria MA on 12/5/2024 at 3:27 PM.

## 2024-12-05 NOTE — CONFIDENTIAL NOTE
Pt called back into clinic. Appt scheduled.    Stephany CORTEZ RN   [facility], Urology   12/5/2024 1:43 PM

## 2024-12-06 NOTE — LETTER
10/11/2021         RE: Christopher Rodriguez  [address] [place] [address]        Dear Colleague,    Thank you for referring your patient, Christopher Rodriguez, to the [facility]. Please see a copy of my visit note below.    [facility] Dermatology Note  Encounter Date: Oct 11, 2021  Office Visit     Dermatology Problem List:  1. AKs. Liq N2.  - efudex/calcipotriene initiated 10/11/21  ____________________________________________    Assessment & Plan:    # Benign lesions: Multiple benign nevi, solar lentigos, seborrheic keratoses, cherry angiomas, dermatofibromas. Explained to patient benign nature of lesion. No treatment is necessary at this time unless the lesion changes or becomes symptomatic.   - ABCDs of melanoma were discussed and self skin checks were advised.  - Sun precaution was advised including the use of sun screens of SPF 30 or higher, sun protective clothing, and avoidance of tanning beds.    # Actinic Keratosis. - left cheek and crown of scalp.  - See cryotherapy procedure note below.    # Diffuse actinic damage - scalp  - Initiate Efudex 5% cream and calcipotriene 0.005% cream applied to the scalp BID for 4-7 days. Discussed that patient should expect redness, blistering and scabbing as well as time course discussed. Informed patient to keep away from children/pets and to wash hands after application.    Procedures Performed:   - Cryotherapy procedure note, locations: left cheek and crown of scalp. After verbal consent and discussion of risks and benefits including, but not limited to, dyspigmentation/scar, blister, and pain, 10 AKs were treated with 1-2 mm freeze border for 1-2 cycles with liquid nitrogen. Post cryotherapy instructions were provided.    Follow-up: 3-4 months for efudex/calcipotriene follow up, 1 year in-person for skin check, or earlier for new or changing lesions.    Staff and Scribe:     Scribe Disclosure:   Ivy BURRELL am  "serving as a scribe to document services personally performed by this physician, Dr. Andrei Alvarez, based on data collection and the provider's statements to me.     Provider Disclosure:   The documentation recorded by the scribe accurately reflects the services I personally performed and the decisions made by me.    Andrei Alvarez MD    Department of Dermatology  [facility]: Phone: [phone], Fax:[phone]  [facility]: Phone: [phone] Fax: [phone]  ____________________________________________    CC: Skin Check (pt concerns brown spots under bilateral eyes and scaley scalp. Has not been to derm, No personal HX of SC, Dad unknown SC)    HPI:  Mr. Christopher Rodriguez is a(n) 61 year old male who presents today as a new patient for skin check.    Referred by PCP for scalp lesion on 9/30/21. Note reviewed. States \"scaly patch lesions to scalp\".     Today, patient notes the following concerns:    1. Lesion on the face - dark spots.  2. Scalp - scabby and scaly.    The patient otherwise denies any new or concerning lesions. No bleeding, painful, pruritic, or changing lesions. They report no personal history of skin cancer. There is a family history of skin cancer (unknown type in father). No history of immunosuppression. Minimal history of indoor tanning (twice about 5 years ago). They do not use sunscreen and protective clothing when outdoors for sun protection. Does not burn easily. There is occupational exposure to ultraviolet light or other forms of radiation. Patient works in landscaping/construction. Health otherwise stable. No other skin concerns.     Labs:  N/A    Physical Exam:  Vitals: There were no vitals taken for this visit.  SKIN: Full skin, which includes the head/face, both arms, chest, back, abdomen, both legs, genitalia and/or groin buttocks, digits and/or nails, was " examined.  - Brown macules on sun exposed areas  - Brown waxy, stuck-on appearing papules on face, trunk, and extremities.   - Brown macules and papules on trunk and extremities without concerning dermoscopy features  - Red vascular papules on face, trunk and extremities.   - Pink gritty papules on the left cheek x 1 and crown of scalp x 9.  - There are multiple firm tan/flesh colored papules that dimple with lateral pressure on the bilateral lower legs.  - No other lesions of concern on areas examined.     Medications:  Current Outpatient Medications   Medication     atorvastatin (LIPITOR) 40 MG tablet     buPROPion (WELLBUTRIN XL) 300 MG 24 hr tablet     desvenlafaxine (PRISTIQ) 100 MG 24 hr tablet     glyBURIDE (DIABETA /MICRONASE) 5 MG tablet     losartan (COZAAR) 100 MG tablet     metFORMIN (GLUCOPHAGE) 1000 MG tablet     tadalafil (CIALIS) 10 MG tablet     TRULICITY 1.5 MG/0.5ML pen     No current facility-administered medications for this visit.      Past Medical History:   Patient Active Problem List   Diagnosis     Hypertension goal BP (blood pressure) < 140/90     Morbid obesity (H)     Hyperlipidemia LDL goal <100     Type 2 diabetes mellitus without complication, without long-term current use of insulin (H)     Major depressive disorder with single episode, in partial remission (H)     Behavioral tic     Other male erectile dysfunction     Asymptomatic varicose veins of both lower extremities     Past Medical History:   Diagnosis Date     Depressive disorder      Diabetes (H)      Hyperlipidemia      Hypertension         CC RABIA Lozoya CNP  [address] [place] [address] on close of this encounter.      Again, thank you for allowing me to participate in the care of your patient.        Sincerely,        Andrei Alvarez MD     no known allergies

## 2024-12-10 ENCOUNTER — TELEPHONE (OUTPATIENT)
Dept: PHARMACY | Facility: CLINIC | Age: 64
End: 2024-12-10
Payer: COMMERCIAL

## 2024-12-10 NOTE — TELEPHONE ENCOUNTER
[facility] referral from: [facility] visit (referral by provider)    MTM referral outreach attempt #2 on December 10, 2024 at 12:38 PM      Outcome: Spoke with patient he will call back when able    Use [employer] & PHI vasquez for the carrier/Plan on the flowsheet          Qian Link [facility]  -[facility]  [phone]

## 2024-12-11 ENCOUNTER — VIRTUAL VISIT (OUTPATIENT)
Dept: UROLOGY | Facility: CLINIC | Age: 64
End: 2024-12-11
Payer: COMMERCIAL

## 2024-12-11 DIAGNOSIS — R39.15 URINARY URGENCY: ICD-10-CM

## 2024-12-11 DIAGNOSIS — E11.42 TYPE 2 DIABETES MELLITUS WITH DIABETIC POLYNEUROPATHY, WITHOUT LONG-TERM CURRENT USE OF INSULIN (H): Primary | ICD-10-CM

## 2024-12-11 PROCEDURE — 99213 OFFICE O/P EST LOW 20 MIN: CPT | Mod: 95 | Performed by: UROLOGY

## 2024-12-11 RX ORDER — TOLTERODINE 4 MG/1
4 CAPSULE, EXTENDED RELEASE ORAL DAILY
Qty: 90 CAPSULE | Refills: 3 | Status: SHIPPED | OUTPATIENT
Start: 2024-12-11

## 2024-12-17 NOTE — PROGRESS NOTES
Medication Therapy Management (MTM) Encounter    ASSESSMENT:                            Medication Adherence/Access: No issues identified.    1. Type 2 diabetes mellitus with diabetic polyneuropathy, without long-term current use of insulin (H) (Primary)  A1C is not at goal of < 7%. All current medications are optimized so may be best to change Trulicity to more effective medication like Mounjaro. Patient would also benefit from further education on carb moderating. Strongly encourage patient to start checking blood sugars so we can evaluate his readings to titrate medications as needed.     D5 Optimal Care:   - Microalbumin up to date; at goal of < 30 mg/g. Appropriately taking ACEI/ARB as indicated.  - Aspirin: recommend risk/benefit discussion based on age >60 - pt may benefit from aspirin based on ASCVD risk score. Can discuss at follow-up  The 10-year ASCVD risk score (Betito TILLMAN, et al., 2019) is: 25.2%  - Statin: Patient is taking statin.   - Immunizations are not up-to-date. Due for annual influenza vaccine but patient declines  - Annual Eye Exam is due    2. Major depressive disorder, recurrent episode, moderate (H)  Uncontrolled. Pt is working on establishing with psych for this.    3. Other urinary incontinence  Improved with tolterodine. Expect this will improve more with improved blood glucose control.    4. Hypertension goal BP (blood pressure) < 140/90  Stable. Blood pressure at goal     PLAN:                            1. Stop Trulicity and replace with Mounjaro 5 mg weekly  2. Provided education on high carb foods  3. Schedule diabetes eye exam    Follow-up: Return in 4 weeks (on 1/15/2025) for Follow up, with me, using a phone visit.    SUBJECTIVE/OBJECTIVE:                          Christopher Rodriguez is a 64 year old male seen for an initial visit. He was referred to me from Cici Chaudhari CNP.      Reason for visit: uncontrolled diabetes.    Allergies/ADRs: Reviewed in chart  Past Medical History:  Reviewed in chart  Tobacco: He reports that he has never smoked. He has never used smokeless tobacco.  Alcohol: goes weeks without use but can have up to 4 per day sometimes    Medication Adherence/Access: Patient takes medications directly from bottles. Patient takes medications 2 time(s) per day.   Diabetes   Type 2 Diabetes  Metformin IR 1000 mg twice daily   Glyburide 10 mg twice daily   Trulicity 4.5 mg weekly  Statin: Atorvastatin 40 mg daily    Pt reports his A1C has slowly increased over the past few years  Thinks this is from eating foods that are high in sugar  He does notice appetite suppression on Trulicity  Usually only eats once per day - isn't hungry in the mornings  Denies diarrhea, GI upset, or hypoglycemia on current regimen  Current diabetes symptoms: polyuria  Diet/Exercise: not moderating carbohydrates in diet, usually eats one big meal in the evenings    Blood glucose monitoring: with glucometer on occasion but hasn't checked in several months     Foot exam is up to date  Mental Health   Depression  Bupropion  mg once daily  Desvenlafaxine 100 mg daily    Pt doesn't feel mood is well managed right now  He just got referral to psych from PCP   Is working on scheduling this     Urinary Incontinence  Tolterodine LA 4 mg daily    Patient is following with urology  Was supposed to take tamsulosin but stopped due to GI side effects  Now managed on tolterodine  Urology discussed that diabetes is most likely culprit for his symptoms  Pt verbalized understanding    Hypertension:  Losartan 100 mg daily   Tolerating well  Denies dizziness  Does not report checking blood pressures at home    ----------------  I spent 13 minutes with this patient today. All changes were made via collaborative practice agreement with Su Stubbs MD. A copy of the visit note was provided to the patient's provider(s).    A summary of these recommendations was sent via [employer].    Jermaine Mcknight, PharmD,  [facility]  Medication Therapy Management Provider  [phone]    Telemedicine Visit Details  The patient's medications can be safely assessed via a telemedicine encounter.  Type of service:  Telephone visit  Originating Location (pt. Location): Home    Distant Location (provider location):  On-site  Start Time: 2:33 PM  End Time: 2:46 PM     Medication Therapy Recommendations  Type 2 diabetes mellitus (H)   1 Current Medication: Dulaglutide (TRULICITY) 4.5 MG/0.5ML SOAJ (Discontinued)   Current Medication Sig: Inject 4.5 mg as directed once a week.   Rationale: More effective medication available - Ineffective medication - Effectiveness   Recommendation: Change Medication - Mounjaro 5 MG/0.5ML Soaj   Status: Accepted per CPA   Identified Date: 12/18/2024 Completed Date: 12/18/2024         Type 2 diabetes mellitus with diabetic polyneuropathy, without long-term current use of insulin (H)   1 Current Medication: glyBURIDE (DIABETA /MICRONASE) 5 MG tablet   Current Medication Sig: Take 2 tablets (10 mg) by mouth 2 times daily (with meals).   Rationale: Medication requires monitoring - Needs additional monitoring   Recommendation: Self-Monitoring - glyBURIDE 5 MG tablet   Status: Patient Agreed - Adherence/Education   Identified Date: 12/18/2024 Completed Date: 12/18/2024

## 2024-12-18 ENCOUNTER — VIRTUAL VISIT (OUTPATIENT)
Dept: PHARMACY | Facility: CLINIC | Age: 64
End: 2024-12-18
Payer: COMMERCIAL

## 2024-12-18 DIAGNOSIS — F33.1 MAJOR DEPRESSIVE DISORDER, RECURRENT EPISODE, MODERATE (H): Chronic | ICD-10-CM

## 2024-12-18 DIAGNOSIS — E11.42 TYPE 2 DIABETES MELLITUS WITH DIABETIC POLYNEUROPATHY, WITHOUT LONG-TERM CURRENT USE OF INSULIN (H): Primary | ICD-10-CM

## 2024-12-18 DIAGNOSIS — I10 HYPERTENSION GOAL BP (BLOOD PRESSURE) < 140/90: Chronic | ICD-10-CM

## 2024-12-18 DIAGNOSIS — N39.498 OTHER URINARY INCONTINENCE: ICD-10-CM

## 2024-12-18 PROCEDURE — 99605 MTMS BY PHARM NP 15 MIN: CPT | Mod: 93 | Performed by: PHARMACIST

## 2024-12-18 PROCEDURE — 99607 MTMS BY PHARM ADDL 15 MIN: CPT | Mod: 93 | Performed by: PHARMACIST

## 2024-12-18 NOTE — PATIENT INSTRUCTIONS
Devang White, it was great talking with you today!     Recommendations from today's MTM visit:                                                      1. Stop Trulicity - replace with Mounjaro 5 mg weekly  2. Foods that contain carbohydrates and increase your blood sugars:  1) Breads  Roscoe bread  Tortilla  Bagel  Biscuit  Pancake  Waffle  Cornbread  Hotdog/hamburger bun  Flatbread   2) Cereals, grains, legumes  Pasta  Rice  Barley  Quinoa  Polenta  Grits  Beans (black beans, navy beans, lentils, kidney beans, etc)  3) Starchy vegetables  Corn  Peas  Cassava/Plantain  Potato  Squash  4) Crackers/Snack foods  Dom crackers  Saltines  Snack chips  Tortilla chips  Pretzels  Popcorn  5) Fruit  6) Milk and yogurt  7) Sweets/desserts/candy/ice cream  8) Sweetened beverages  Regular pop (not diet or sugar free)  Lemonade  Iced tea  Sweetened coffee drinks/coffee creamer    I value your experience and would be very grateful for your time with providing feedback on our clinic survey. You may receive a survey via email or text message in the next few days.     Next MTM visit: 1/15/25    To schedule another MTM appointment, please call the clinic directly or you may call the MTM scheduling line at [phone] or toll-free at [phone].     My Clinical Pharmacist's contact information:                                                      Please feel free to contact me with any questions or concerns you have.      Jermaine Mcknight, PharmD, BCACP  [facility]  Phone: [phone]

## 2024-12-18 NOTE — Clinical Note
GAGE - changed Trulicity to Mounjaro today and provided more education on carb moderation. Have follow-up in one month to check in and titrate Mounjaro if needed. Thanks for referral! Jermaine

## 2025-01-14 NOTE — PROGRESS NOTES
Medication Therapy Management (MTM) Encounter    ASSESSMENT:                            Medication Adherence/Access: No issues identified.     1. Type 2 diabetes mellitus with diabetic polyneuropathy, without long-term current use of insulin (H) (Primary)  A1C is not at goal of < 7%. Ordered Mounjaro at last visit but patient never started it due to insurance changing/confusion. Recommend patient give [facility] his new insurance information so they can use that to process his Mounjaro. Also strongly encourage patient to start checking blood sugars so we can assess his diabetes control and titrate medications as needed.      D5 Optimal Care:   - Microalbumin up to date; at goal of < 30 mg/g. Appropriately taking ACEI/ARB as indicated.  - Aspirin: recommend risk/benefit discussion based on age >60 - pt may benefit from aspirin based on ASCVD risk score. Can discuss at follow-up. The 10-year ASCVD risk score (Betito TILLMAN, et al., 2019) is: 25.2%  - Statin: Patient is taking statin.   - Immunizations are not up-to-date. Due for annual influenza vaccine but patient declines  - Annual Eye Exam is due     2. Major depressive disorder, recurrent episode, moderate (H)  Uncontrolled. Pt is working on establishing with psych for this.     3. Other urinary incontinence  Improved with tolterodine. Expect this will improve more with improved blood glucose control.     4. Hypertension goal BP (blood pressure) < 140/90  Stable. Blood pressure at goal.    PLAN:                            1. Call [facility] and give them your new insurance card - we can work on authorization for Mounjaro after that if needed  2. Helped patient reset his [employer] password so he could get into it    Follow-up: Return in 23 days (on 2/7/2025) for Follow up, with me, using a phone visit.    SUBJECTIVE/OBJECTIVE:                          Christopher Rodriguez is a 64 year old male seen for a follow-up visit.       Reason for visit: uncontrolled diabetes    Allergies/ADRs:  "Reviewed in chart  Past Medical History: Reviewed in chart  Tobacco: He reports that he has never smoked. He has never used smokeless tobacco.  Alcohol: goes weeks without use but can have up to 4 per day sometimes     Medication Adherence/Access: Patient takes medications directly from bottles. Patient takes medications 2 time(s) per day.   Diabetes   Type 2 Diabetes  Metformin IR 1000 mg twice daily   Glyburide 10 mg twice daily   Trulicity 4.5 mg weekly  Statin: Atorvastatin 40 mg daily    Pt never started Mounjaro last month  He switched over to Medicare and didn't realize he needed to give the pharmacy his new insurance card  He has not done this yet but is going there today and can provide at that time  He has not checked blood sugars because \"I didn't change my meds yet so I didn't see the point\"  He was not able to view the resources on carbohydrates - says he can't get into [employer]  Helped him change password today during visit  Current diabetes symptoms: polyuria  Diet/Exercise: not moderating carbohydrates in diet, usually eats one big meal in the evenings because he isn't hungry in the mornings    Blood glucose monitoring: with glucometer on occasion but hasn't checked in several months   Eye exam in the last 12 months? No  Foot exam is up to date    Depression  Bupropion  mg once daily  Desvenlafaxine 100 mg daily     Pt doesn't feel mood is well managed right now  He got referral to psych from PCP but hasn't followed up on it yet  Is working on scheduling - trying to navigate insurance change first  Hypertension   Losartan 100 mg daily     Tolerating well  Denies dizziness/lightheadedness  Does not report checking blood pressures at home     Urinary Incontinence  Tolterodine LA 4 mg daily    Med history:  Tamsulosin - GI side effects     Patient is following with urology  Urology stated that uncontrolled blood sugars are most likely causing his frequent urination  Pt aware but hasn't been able to " correct blood sugars due to insurance change    ----------------  I spent 8 minutes with this patient today. All changes were made via collaborative practice agreement with Su Stubbs MD. A copy of the visit note was provided to the patient's provider(s).    A summary of these recommendations was sent via [employer].    Jermaine Mcknight, PharmD, [facility]  Medication Therapy Management Provider  [phone]    Telemedicine Visit Details  The patient's medications can be safely assessed via a telemedicine encounter.  Type of service:  Telephone visit  Originating Location (pt. Location): Home    Distant Location (provider location):  Off-site  Start Time: 3:00 PM  End Time: 3:08 PM     Medication Therapy Recommendations  Type 2 diabetes mellitus (H)   1 Current Medication: Tirzepatide 5 MG/0.5ML SOAJ   Current Medication Sig: Inject 0.5 mLs (5 mg) subcutaneously every 7 days.   Rationale: Medication product not available - Adherence - Adherence   Recommendation: Provide Adherence Intervention - Mounjaro 5 MG/0.5ML Soaj   Status: Patient Agreed - Adherence/Education   Identified Date: 1/15/2025 Completed Date: 1/16/2025

## 2025-01-15 ENCOUNTER — VIRTUAL VISIT (OUTPATIENT)
Dept: PHARMACY | Facility: CLINIC | Age: 65
End: 2025-01-15
Payer: MEDICARE

## 2025-01-15 DIAGNOSIS — E11.42 TYPE 2 DIABETES MELLITUS WITH DIABETIC POLYNEUROPATHY, WITHOUT LONG-TERM CURRENT USE OF INSULIN (H): Primary | ICD-10-CM

## 2025-01-15 DIAGNOSIS — I10 HYPERTENSION GOAL BP (BLOOD PRESSURE) < 140/90: ICD-10-CM

## 2025-01-15 DIAGNOSIS — F33.1 MAJOR DEPRESSIVE DISORDER, RECURRENT EPISODE, MODERATE (H): ICD-10-CM

## 2025-01-15 DIAGNOSIS — N39.498 OTHER URINARY INCONTINENCE: ICD-10-CM

## 2025-01-15 NOTE — Clinical Note
FYI - patient never started Mounjaro due to some insurance confusion. We straightened that out with his pharmacy today and he was able to pick it up now. Have follow-up in 3 weeks to check in and make further med adjustments as needed.

## 2025-01-15 NOTE — PATIENT INSTRUCTIONS
Devang White, it was great talking with you today!     Recommendations from today's MTM visit:                                                      1. Call [facility] and give them your new insurance card info - we can work on authorization for Mounjaro after they have that    I value your experience and would be very grateful for your time with providing feedback on our clinic survey. You may receive a survey via email or text message in the next few days.     Next MTM visit: 2/7/25    To schedule another MTM appointment, please call the clinic directly or you may call the MTM scheduling line at [phone] or toll-free at [phone].     My Clinical Pharmacist's contact information:                                                      Please feel free to contact me with any questions or concerns you have.      Jermaine Mcknight, PharmD, BCACP  [facility]  Phone: [phone]

## 2025-02-06 NOTE — PROGRESS NOTES
Medication Therapy Management (MTM) Encounter    ASSESSMENT:                            Medication Adherence/Access: No issues identified.     1. Type 2 diabetes mellitus with diabetic polyneuropathy, without long-term current use of insulin (H) (Primary)  A1C is not at goal of < 7% and fasting blood glucose is not at goal of  mg/dL. Pt would benefit from further titration of Mounjaro.    PLAN:                            1. Increase Mounjaro to 7.5 mg weekly  2. Ordered Wilfredo 3+ as requested by patient    Follow-up: Return in 5 weeks (on 3/14/2025) for Follow up, with me, using a phone visit.    SUBJECTIVE/OBJECTIVE:                          Christopher Rodriguez is a 65 year old male seen for a follow-up visit.       Reason for visit: diabetes follow-up    Allergies/ADRs: Reviewed in chart  Past Medical History: Reviewed in chart  Tobacco: He reports that he has never smoked. He has never used smokeless tobacco.  Alcohol: goes weeks without use but can have up to 4 per day sometimes     Medication Adherence/Access: Patient takes medications directly from bottles. Patient takes medications 2 time(s) per day.   Diabetes   Type 2 Diabetes  Metformin IR 1000 mg twice daily   Glyburide 10 mg twice daily   Mounjaro 5 mg weekly - started yet?  Statin: Atorvastatin 40 mg daily    Med history:  Trulicity - not effective at max dose    Pt started Mounjaro last month  Denies nausea, diarrhea, or constipation  Reports blood sugars have been quite high still and he would like to increase his dose  He is also interested in getting a Wilfredo if possible to monitor blood sugars easier  Current diabetes symptoms: polyuria  Diet/Exercise: not moderating carbohydrates in diet, usually eats one big meal in the evenings because he isn't hungry in the mornings    Blood glucose monitoring: with glucometer, fasting blood sugars have been 201, 212, 195 mg/dL     Eye exam in the last 12 months? No - wants blood sugars lower first  Foot exam is up  to date    ----------------  I spent 11 minutes with this patient today. All changes were made via collaborative practice agreement with Su Stubbs MD. A copy of the visit note was provided to the patient's provider(s).    A summary of these recommendations was sent via [employer].    Jermaine Mcknight, PharmD, BCACP  Medication Therapy Management Provider  [phone]    Telemedicine Visit Details  The patient's medications can be safely assessed via a telemedicine encounter.  Type of service:  Telephone visit  Originating Location (pt. Location): Home    Distant Location (provider location):  On-site  Start Time: 3:00 PM  End Time: 3:11 PM     Medication Therapy Recommendations  Type 2 diabetes mellitus (H)   1 Current Medication: Tirzepatide 5 MG/0.5ML SOAJ (Discontinued)   Current Medication Sig: Inject 0.5 mLs (5 mg) subcutaneously every 7 days.   Rationale: Medication requires monitoring - Needs additional monitoring   Recommendation: Start Medication - FreeStyle Wilfredo 3 Plus Sensor Misc   Status: Accepted per CPA   Identified Date: 2/7/2025 Completed Date: 2/11/2025         Type 2 diabetes mellitus with diabetic polyneuropathy, without long-term current use of insulin (H)   1 Current Medication: Tirzepatide 5 MG/0.5ML SOAJ (Discontinued)   Current Medication Sig: Inject 0.5 mLs (5 mg) subcutaneously every 7 days.   Rationale: Dose too low - Dosage too low - Effectiveness   Recommendation: Increase Dose - Tirzepatide 7.5 MG/0.5ML Soaj   Status: Accepted per CPA   Identified Date: 2/7/2025 Completed Date: 2/11/2025

## 2025-02-07 ENCOUNTER — VIRTUAL VISIT (OUTPATIENT)
Dept: PHARMACY | Facility: CLINIC | Age: 65
End: 2025-02-07
Payer: COMMERCIAL

## 2025-02-07 DIAGNOSIS — E11.42 TYPE 2 DIABETES MELLITUS WITH DIABETIC POLYNEUROPATHY, WITHOUT LONG-TERM CURRENT USE OF INSULIN (H): Primary | ICD-10-CM

## 2025-02-07 PROCEDURE — 99606 MTMS BY PHARM EST 15 MIN: CPT | Mod: 93 | Performed by: PHARMACIST

## 2025-02-07 PROCEDURE — 99607 MTMS BY PHARM ADDL 15 MIN: CPT | Performed by: PHARMACIST

## 2025-02-07 RX ORDER — HYDROCHLOROTHIAZIDE 12.5 MG/1
CAPSULE ORAL
Qty: 6 EACH | Refills: 3 | Status: SHIPPED | OUTPATIENT
Start: 2025-02-07

## 2025-02-07 NOTE — Clinical Note
FYI - pt is tolerating transitions to Mounjaro well but blood sugars are still high. Increased dose today and also ordered Wilfredo per patient request. Have follow-up in one month to check in!

## 2025-02-11 NOTE — PATIENT INSTRUCTIONS
Devang White, it was great talking with you today!     Recommendations from today's MTM visit:                                                      1. Increase Mounjaro to 7.5 mg weekly  2. Ordered Wilfredo 3+ sensors     I value your experience and would be very grateful for your time with providing feedback on our clinic survey. You may receive a survey via email or text message in the next few days.     Next MTM visit: 3/14/25    To schedule another MTM appointment, please call the clinic directly or you may call the MTM scheduling line at [phone] or toll-free at [phone].     My Clinical Pharmacist's contact information:                                                      Please feel free to contact me with any questions or concerns you have.      Jermaine Mcknight, PharmD, BCACP  [facility]  Phone: [phone]

## 2025-02-25 ENCOUNTER — TELEPHONE (OUTPATIENT)
Dept: FAMILY MEDICINE | Facility: CLINIC | Age: 65
End: 2025-02-25
Payer: COMMERCIAL

## 2025-02-25 NOTE — TELEPHONE ENCOUNTER
Patient Quality Outreach    Patient is due for the following:   Depression  -  PHQ-9 needed      Topic Date Due    Flu Vaccine (1) 09/01/2024    COVID-19 Vaccine (4 - 2024-25 season) 09/01/2024       Action(s) Taken:   Schedule a office visit for depression     Type of outreach:    Sent [employer] message.    Questions for provider review:    None           Kailee Lacey MA

## 2025-02-27 ENCOUNTER — TELEPHONE (OUTPATIENT)
Dept: PHARMACY | Facility: OTHER | Age: 65
End: 2025-02-27
Payer: COMMERCIAL

## 2025-02-27 NOTE — TELEPHONE ENCOUNTER
Left voicemail for patient to update his new 2025 insurance. He currently has a 02/07/2025 visit claim that is defer in the denial que waiting for updated insurance. David has term on 12/31/2024.     See Khoi  [facility]   [phone]

## 2025-03-15 ENCOUNTER — HEALTH MAINTENANCE LETTER (OUTPATIENT)
Age: 65
End: 2025-03-15

## 2025-04-04 ENCOUNTER — TELEPHONE (OUTPATIENT)
Dept: FAMILY MEDICINE | Facility: CLINIC | Age: 65
End: 2025-04-04

## 2025-04-08 NOTE — TELEPHONE ENCOUNTER
Retail Pharmacy Prior Authorization Team   Phone: [phone]    PA Initiation    Medication: GLYBURIDE 5 MG PO TABS  Insurance Company: [employer] - Phone [phone] Fax [phone]  Pharmacy Filling the Rx: [id number] - [place], [place] - [address]  Filling Pharmacy Phone: [phone]  Filling Pharmacy Fax:    Start Date: 4/8/2025

## 2025-04-08 NOTE — TELEPHONE ENCOUNTER
Prior Authorization Approval    Authorization Effective Date: 4/8/2025  Authorization Expiration Date: 4/8/2026  Medication: glyBURIDE -APPROVED  Reference #:     Insurance Company: [employer] - Phone [phone] Fax [phone]  Which Pharmacy is filling the prescription (Not needed for infusion/clinic administered): [id number] - [place], [place] - [address]  Pharmacy Notified: Yes  Patient Notified: Instructed pharmacy to notify patient when script is ready to /ship.    Patient already picked up using [facility]

## 2025-05-05 DIAGNOSIS — E11.42 TYPE 2 DIABETES MELLITUS WITH DIABETIC POLYNEUROPATHY, WITHOUT LONG-TERM CURRENT USE OF INSULIN (H): ICD-10-CM

## 2025-05-05 RX ORDER — TIRZEPATIDE 10 MG/.5ML
INJECTION, SOLUTION SUBCUTANEOUS
Qty: 6 ML | Refills: 1 | Status: SHIPPED | OUTPATIENT
Start: 2025-05-05

## 2025-06-12 NOTE — PROGRESS NOTES
Medication Therapy Management (MTM) Encounter    ASSESSMENT:                            Medication Adherence/Access: pt can't use Wilfredo sensors because he is not able to get the Wilfredo 3+ shannon on his phone. He is willing to use the reader instead of the shannon.    1. Type 2 diabetes  A1C is not at goal of < 7% and fasting blood glucose is not at goal of  mg/dL.. Blood sugars have improved significantly with Mounjaro titration but are still slightly above goal. Would be best if patient could put Wilfredo sensors on prior to further med adjustments. He is experiencing sweating and would be best to rule out hypoglycemia as a cause of this. He also is only eating one meal per day which is not recommended, especially while taking sulfonylurea. If he starts using Wilfredo, we can work on adjusting medications at next visit. Could consider increasing Mounjaro further if constipation improves with Miralax otherwise would add SGLT2i.     D5 Optimal Care:   - Microalbumin is due  - Aspirin: pt declined  - Immunizations are not up-to-date. Due for COVID booster and RSV. Pt aware  - Annual Eye Exam is due. Scheduled for 6/23    2. Hypertension  Controlled. Blood pressure at goal today    3. Depression  Uncontrolled. Pt is working on establishing with psych for this.     4. Urinary incontinence  Stable    PLAN:                            1. Start Miralax 17g daily to help with constipation  2. Work on eating smaller meals more frequently throughout the day - it is important that you don't go more than 4-6 hours without food  3. Put Wilfredo sensor on. I ordered you the Wilfredo reader so you can use this instead of your phone    Follow-up: Return in about 26 days (around 7/9/2025) for Follow up, with me, using a phone visit.    SUBJECTIVE/OBJECTIVE:                          Christopher Rodriguez is a 65 year old male coming in for a follow-up visit.       Reason for visit: labs/weight today; help set up Wilfredo    Allergies/ADRs: Reviewed in  chart  Past Medical History: Reviewed in chart  Tobacco: He reports that he has never smoked. He has never used smokeless tobacco.  Alcohol: goes weeks without use but can have up to 4 per day sometimes     Medication Adherence/Access: Patient takes medications directly from bottles. Patient takes medications 2 time(s) per day.   Diabetes   Type 2 Diabetes  Metformin IR 1000 mg twice daily   Glyburide 10 mg twice daily   Mounjaro 12.5 mg weekly  Statin: Atorvastatin 40 mg daily    Med history:  Trulicity - not effective at max dose    Pt increased Mounjaro last month  Still having concerns with mixed constipation and diarrhea   Never started Miralax as recommended  He has been sweating more lately but doesn't think it is hypoglycemia  He was sweating during visit today but blood sugar was 200 mg/dL  Still hasn't put on Wilfredo sensor and he forgot to bring the supplies today  He says he will try to put it on at home  We watched a video today explaining how to place sensor  Current diabetes symptoms: none  Diet/Exercise: not moderating carbohydrates in diet, usually eats one big meal in the evenings because he isn't hungry in the mornings.     Blood glucose monitoring: checking with glucometer, fasting readings have been 140 mg/dL. He still hasn't set up his Wilfredo yet. 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 No - scheduled for 6/23  Foot exam is up to date    Hypertension   Losartan 100 mg daily     Tolerating well  Denies dizziness/lightheadedness  Does not report checking blood pressures at home     Mental Health   Depression  Bupropion  mg once daily  Desvenlafaxine 100 mg daily    Pt doesn't feel mood is well managed right now  He has referral to psych from PCP   Is working on scheduling this  Sweating may be adverse effect from desvenlafaxine  Pt advised to follow-up with  PCP on this if he has concerns     Urinary Incontinence  Tolterodine LA 4 mg daily     Patient saw urology and was started on tolterodine  Tolerating well  Denies concerns    Today's Vitals:   /84   Wt 233 lb (105.7 kg)   BMI 33.43 kg/m    ----------------  I spent 30 minutes with this patient today. All changes were made via collaborative practice agreement with Su Stubbs MD. A copy of the visit note was provided to the patient's provider(s).    A summary of these recommendations was given to the patient.    Jermaine Mcknight, PharmD, [facility]  Medication Therapy Management Provider  [phone]     Medication Therapy Recommendations  Type 2 diabetes mellitus (H)   1 Current Medication: tirzepatide (MOUNJARO) 12.5 MG/0.5ML SOAJ auto-injector pen   Current Medication Sig: Inject 0.5 mLs (12.5 mg) subcutaneously every 7 days.   Rationale: Undesirable effect - Adverse medication event - Safety   Recommendation: Start Medication - MiraLax 17 GM/SCOOP Powd   Status: Accepted per CPA   Identified Date: 6/13/2025 Completed Date: 6/17/2025         Type 2 diabetes mellitus with diabetic polyneuropathy, without long-term current use of insulin (H)   1 Current Medication: Continuous Glucose Sensor (FREESTYLE WILFREDO 3 PLUS SENSOR) MISC   Current Medication Sig: Use 1 sensor every 15 days. Use to read blood sugars per 's instructions.   Rationale: Cannot swallow/administer medication - Adherence - Adherence   Recommendation: Provide Adherence Intervention - FreeStyle Wilfredo 3 Plus Sensor Misc   Status: Accepted per CPA   Identified Date: 6/13/2025 Completed Date: 6/17/2025

## 2025-06-13 ENCOUNTER — OFFICE VISIT (OUTPATIENT)
Dept: PHARMACY | Facility: CLINIC | Age: 65
End: 2025-06-13
Payer: COMMERCIAL

## 2025-06-13 ENCOUNTER — APPOINTMENT (OUTPATIENT)
Dept: LAB | Facility: CLINIC | Age: 65
End: 2025-06-13
Payer: COMMERCIAL

## 2025-06-13 ENCOUNTER — TELEPHONE (OUTPATIENT)
Dept: FAMILY MEDICINE | Facility: CLINIC | Age: 65
End: 2025-06-13

## 2025-06-13 ENCOUNTER — RESULTS FOLLOW-UP (OUTPATIENT)
Dept: PHARMACY | Facility: CLINIC | Age: 65
End: 2025-06-13

## 2025-06-13 VITALS — SYSTOLIC BLOOD PRESSURE: 126 MMHG | WEIGHT: 233 LBS | BODY MASS INDEX: 33.43 KG/M2 | DIASTOLIC BLOOD PRESSURE: 84 MMHG

## 2025-06-13 DIAGNOSIS — E11.42 TYPE 2 DIABETES MELLITUS WITH DIABETIC POLYNEUROPATHY, WITHOUT LONG-TERM CURRENT USE OF INSULIN (H): Primary | ICD-10-CM

## 2025-06-13 DIAGNOSIS — F33.1 MAJOR DEPRESSIVE DISORDER, RECURRENT EPISODE, MODERATE (H): ICD-10-CM

## 2025-06-13 DIAGNOSIS — E11.9 TYPE 2 DIABETES MELLITUS WITHOUT COMPLICATION, WITHOUT LONG-TERM CURRENT USE OF INSULIN (H): ICD-10-CM

## 2025-06-13 DIAGNOSIS — I10 HYPERTENSION GOAL BP (BLOOD PRESSURE) < 140/90: ICD-10-CM

## 2025-06-13 DIAGNOSIS — K59.01 SLOW TRANSIT CONSTIPATION: ICD-10-CM

## 2025-06-13 DIAGNOSIS — N39.498 OTHER URINARY INCONTINENCE: ICD-10-CM

## 2025-06-13 LAB
CREAT UR-MCNC: 240 MG/DL
EST. AVERAGE GLUCOSE BLD GHB EST-MCNC: 189 MG/DL
HBA1C MFR BLD: 8.2 % (ref 0–5.6)
MICROALBUMIN UR-MCNC: <12 MG/L
MICROALBUMIN/CREAT UR: NORMAL MG/G{CREAT}

## 2025-06-13 PROCEDURE — 3079F DIAST BP 80-89 MM HG: CPT | Performed by: PHARMACIST

## 2025-06-13 PROCEDURE — 99607 MTMS BY PHARM ADDL 15 MIN: CPT | Performed by: PHARMACIST

## 2025-06-13 PROCEDURE — 3074F SYST BP LT 130 MM HG: CPT | Performed by: PHARMACIST

## 2025-06-13 PROCEDURE — 99606 MTMS BY PHARM EST 15 MIN: CPT | Performed by: PHARMACIST

## 2025-06-13 RX ORDER — POLYETHYLENE GLYCOL 3350 17 G/17G
1 POWDER, FOR SOLUTION ORAL DAILY
Qty: 510 G | Refills: 11 | Status: SHIPPED | OUTPATIENT
Start: 2025-06-13

## 2025-06-13 RX ORDER — KETOROLAC TROMETHAMINE 30 MG/ML
1 INJECTION, SOLUTION INTRAMUSCULAR; INTRAVENOUS ONCE
Qty: 1 EACH | Refills: 0 | Status: SHIPPED | OUTPATIENT
Start: 2025-06-13 | End: 2025-06-13

## 2025-06-13 NOTE — PATIENT INSTRUCTIONS
"Recommendations from today's MTM visit:                                                       1. Start Miralax 17g daily to help with constipation  2. Work on eating smaller meals more frequently throughout the day - it is important that you don't go more than 4-6 hours without food  3. Put Wilfredo sensor on. I ordered you the Wilfredo reader so you can use this instead of your phone    Follow-up: 7/9/25    It was great speaking with you today.  I value your experience and would be very thankful for your time in providing feedback in our clinic survey. In the next few days, you may receive an email or text message from [facility] with a link to a survey related to your  clinical pharmacist.\"     To schedule another MTM appointment, please call the clinic directly or you may call the MTM scheduling line at [phone] or toll-free at [phone].     My Clinical Pharmacist's contact information:                                                      Please feel free to contact me with any questions or concerns you have.      Jermaine Mcknight, PharmD, BCACP  [facility]  Phone: [phone]     "

## 2025-06-13 NOTE — TELEPHONE ENCOUNTER
Continuous Glucose  (FREESTYLE NIKOLE 3 READER) ASPEN 1 each       [email or web address]    KEY:[id number]

## 2025-06-13 NOTE — Clinical Note
Hello - saw patient to follow-up on diabetes and recheck labs after med adjustments. Labs, weight, and blood pressure are stable. A1C still above goal but has improved quite significantly with med adjustments. Did not increase Mounjaro today because he is having some constipation from it. Ordered Miralax today and have follow-up in July. Thanks! Jermaine

## 2025-06-14 LAB
ALBUMIN SERPL BCG-MCNC: 4.3 G/DL (ref 3.5–5.2)
ALP SERPL-CCNC: 119 U/L (ref 40–150)
ALT SERPL W P-5'-P-CCNC: 15 U/L (ref 0–70)
ANION GAP SERPL CALCULATED.3IONS-SCNC: 12 MMOL/L (ref 7–15)
AST SERPL W P-5'-P-CCNC: 22 U/L (ref 0–45)
BILIRUB SERPL-MCNC: 0.5 MG/DL
BUN SERPL-MCNC: 18.9 MG/DL (ref 8–23)
CALCIUM SERPL-MCNC: 9.1 MG/DL (ref 8.8–10.4)
CHLORIDE SERPL-SCNC: 99 MMOL/L (ref 98–107)
CHOLEST SERPL-MCNC: 174 MG/DL
CREAT SERPL-MCNC: 1.04 MG/DL (ref 0.67–1.17)
EGFRCR SERPLBLD CKD-EPI 2021: 80 ML/MIN/1.73M2
GLUCOSE SERPL-MCNC: 200 MG/DL (ref 70–99)
HCO3 SERPL-SCNC: 24 MMOL/L (ref 22–29)
HDLC SERPL-MCNC: 38 MG/DL
LDLC SERPL CALC-MCNC: 89 MG/DL
NONHDLC SERPL-MCNC: 136 MG/DL
POTASSIUM SERPL-SCNC: 4.7 MMOL/L (ref 3.4–5.3)
PROT SERPL-MCNC: 7.6 G/DL (ref 6.4–8.3)
SODIUM SERPL-SCNC: 135 MMOL/L (ref 135–145)
TRIGL SERPL-MCNC: 234 MG/DL

## 2025-06-16 NOTE — TELEPHONE ENCOUNTER
Central Prior Authorization Team   Phone: [phone]    PA Initiation    Medication: Continuous Glucose  (FREESTYLE NIKOLE 3 READER) ASPEN  Insurance Company: [employer] - Phone [phone] Fax [phone]  Pharmacy Filling the Rx: [id number] [place], [place] - [address]  Filling Pharmacy Phone: [phone]  Filling Pharmacy Fax:    Start Date: 6/16/2025    [facility] KEY: [id number]

## 2025-06-18 ENCOUNTER — TELEPHONE (OUTPATIENT)
Dept: PHARMACY | Facility: CLINIC | Age: 65
End: 2025-06-18
Payer: COMMERCIAL

## 2025-06-18 NOTE — TELEPHONE ENCOUNTER
Per insurance card, [facility] portal and [facility] MTM product inclusion list-this patient has [facility] medicare which began on 1/1/2025. Changed flow sheet to [facility] medicare

## 2025-06-23 ENCOUNTER — OFFICE VISIT (OUTPATIENT)
Dept: OPTOMETRY | Facility: CLINIC | Age: 65
End: 2025-06-23
Payer: COMMERCIAL

## 2025-06-23 DIAGNOSIS — H02.834 DERMATOCHALASIS OF BOTH UPPER EYELIDS: ICD-10-CM

## 2025-06-23 DIAGNOSIS — H52.223 REGULAR ASTIGMATISM OF BOTH EYES: ICD-10-CM

## 2025-06-23 DIAGNOSIS — H02.831 DERMATOCHALASIS OF BOTH UPPER EYELIDS: ICD-10-CM

## 2025-06-23 DIAGNOSIS — H25.813 COMBINED FORMS OF AGE-RELATED CATARACT OF BOTH EYES: ICD-10-CM

## 2025-06-23 DIAGNOSIS — H52.4 PRESBYOPIA: ICD-10-CM

## 2025-06-23 DIAGNOSIS — E11.9 TYPE 2 DIABETES MELLITUS WITHOUT COMPLICATION, WITHOUT LONG-TERM CURRENT USE OF INSULIN (H): Primary | ICD-10-CM

## 2025-06-23 PROCEDURE — 92015 DETERMINE REFRACTIVE STATE: CPT

## 2025-06-23 PROCEDURE — 92004 COMPRE OPH EXAM NEW PT 1/>: CPT

## 2025-06-23 ASSESSMENT — CUP TO DISC RATIO
OD_RATIO: 0.4
OS_RATIO: 0.3

## 2025-06-23 ASSESSMENT — REFRACTION_MANIFEST
OD_ADD: +2.50
OS_ADD: +2.50
OD_AXIS: 007
OS_AXIS: 171
OS_AXIS: 165
OD_SPHERE: -1.00
OD_CYLINDER: +1.00
OS_CYLINDER: +1.00
OD_CYLINDER: +0.75
OS_CYLINDER: +1.00
OS_SPHERE: -1.00
OD_AXIS: 005
OS_SPHERE: -0.75
OD_SPHERE: -0.50

## 2025-06-23 ASSESSMENT — KERATOMETRY
OD_AXISANGLE2_DEGREES: 126
OD_K1POWER_DIOPTERS: 41.75
OD_AXISANGLE_DEGREES: 36
OS_K1POWER_DIOPTERS: 41.75
OS_AXISANGLE_DEGREES: 155
OS_AXISANGLE2_DEGREES: 65
OD_K2POWER_DIOPTERS: 42.75
OS_K2POWER_DIOPTERS: 42.50

## 2025-06-23 ASSESSMENT — TONOMETRY
OD_IOP_MMHG: 22.5
IOP_METHOD: ICARE
OD_IOP_MMHG: 18
IOP_METHOD: APPLANATION
OS_IOP_MMHG: 21.0
OS_IOP_MMHG: 20

## 2025-06-23 ASSESSMENT — CONF VISUAL FIELD
OS_SUPERIOR_TEMPORAL_RESTRICTION: 0
OS_SUPERIOR_NASAL_RESTRICTION: 0
OD_NORMAL: 1
OD_INFERIOR_NASAL_RESTRICTION: 0
OS_NORMAL: 1
OD_SUPERIOR_NASAL_RESTRICTION: 0
OD_SUPERIOR_TEMPORAL_RESTRICTION: 0
OS_INFERIOR_NASAL_RESTRICTION: 0
OS_INFERIOR_TEMPORAL_RESTRICTION: 0
OD_INFERIOR_TEMPORAL_RESTRICTION: 0

## 2025-06-23 ASSESSMENT — EXTERNAL EXAM - RIGHT EYE: OD_EXAM: NORMAL

## 2025-06-23 ASSESSMENT — VISUAL ACUITY
OD_SC: 20/20
OD_SC: 20/40
OS_PH_SC+: -1
OS_PH_SC: 20/30
METHOD: SNELLEN - LINEAR
OS_SC+: -2
OD_SC+: -2
OS_SC: 20/40
OS_SC: 20/25

## 2025-06-23 ASSESSMENT — SLIT LAMP EXAM - LIDS
COMMENTS: 1+ DERMATOCHALASIS
COMMENTS: 1+ DERMATOCHALASIS

## 2025-06-23 ASSESSMENT — EXTERNAL EXAM - LEFT EYE: OS_EXAM: NORMAL

## 2025-06-23 NOTE — PROGRESS NOTES
Chief Complaint   Patient presents with    Diabetic Eye Exam        Chief Complaint(s) and History of Present Illness(es)       Diabetic Eye Exam              Diabetes Type: Type 2 and taking oral medications    Duration: 22 years    Blood Sugars: fluctuates                   Lab Results   Component Value Date    A1C 8.2 06/13/2025    A1C 10.5 12/05/2024    A1C 9.4 01/09/2024    A1C 9.8 04/07/2023    A1C 8.6 08/21/2022    A1C 7.6 12/17/2020    A1C 8.8 03/18/2020     Last Eye Exam: 2022  Dilated Previously: Yes    What are you currently using to see?  Readers- unknown- did not bring with     Distance Vision Acuity: Noticed gradual change in both eyes    Near Vision Acuity: Satisfied with vision while reading and using computer with readers    Eye Comfort: good  Do you use eye drops? : No    Denelle Anish - Optometric Assistant     Medical, surgical and family histories reviewed and updated 6/23/2025.       OBJECTIVE: See Ophthalmology exam    Christopher Rodriguez aware  eye exam results will be sent to Su Robertson.    Assessment/Plan  (E11.9) Type 2 diabetes mellitus without complication, without long-term current use of insulin (H)  (primary encounter diagnosis)  Plan:   -Patient educated on condition.   -No Diabetic Retinopathy noted in this exam.   -Stressed importance of close BS/BP monitoring, diet/exercise, medication compliance, and regular visits with PCP.   -Discussed getting yearly eye exams.   -Monitor annually.      (H25.813) Combined forms of age-related cataract of both eyes  Plan:   -Discussed signs and symptoms of cataracts.   -Not Visually Significant  -Patient prefers to hold off on cataract surgery at this time.   -Monitor.      (H02.831,  H02.834) Dermatochalasis of both upper eyelids  Plan:   -Longstanding   -Monitor.    (H52.223) Regular astigmatism of both eyes, (H52.4) Presbyopia  Plan:   -New Glasses Rx Given 06/23/2025.   -Discussed giving a couple of weeks to get adjusted to  new glasses.   -Monitor.     Return to clinic for yearly CEE.    Complete documentation of historical and exam elements from today's encounter can be found in the full encounter summary report (not reduplicated in this progress note). I personally obtained the chief complaint(s) and history of present illness. I confirmed and edited as necessary the review of systems, past medical/surgical history, family history, social history, and examination findings as document by others; and I examined the patient myself. I personally reviewed the relevant tests, images, and reports as documented above. I formulated and edited as necessary the assessment and plan and discussed the findings and management plan with the patient and family.    Nilesh Ortiz O.D.

## 2025-06-23 NOTE — LETTER
6/23/2025      Christopher Rodriguez  [address]   [place] [address]      Dear Colleague,    Thank you for referring your patient, Christopher Rodriguez, to the [facility]. Please see a copy of my visit note below.    Chief Complaint   Patient presents with     Diabetic Eye Exam        Chief Complaint(s) and History of Present Illness(es)       Diabetic Eye Exam              Diabetes Type: Type 2 and taking oral medications    Duration: 22 years    Blood Sugars: fluctuates                   Lab Results   Component Value Date    A1C 8.2 06/13/2025    A1C 10.5 12/05/2024    A1C 9.4 01/09/2024    A1C 9.8 04/07/2023    A1C 8.6 08/21/2022    A1C 7.6 12/17/2020    A1C 8.8 03/18/2020     Last Eye Exam: 2022  Dilated Previously: Yes    What are you currently using to see?  Readers- unknown- did not bring with     Distance Vision Acuity: Noticed gradual change in both eyes    Near Vision Acuity: Satisfied with vision while reading and using computer with readers    Eye Comfort: good  Do you use eye drops? : No    Denelle Anish - Optometric Assistant     Medical, surgical and family histories reviewed and updated 6/23/2025.       OBJECTIVE: See Ophthalmology exam    Christopher Rodriguez aware  eye exam results will be sent to Su Robertson.    Assessment/Plan  (E11.9) Type 2 diabetes mellitus without complication, without long-term current use of insulin (H)  (primary encounter diagnosis)  Plan:   -Patient educated on condition.   -No Diabetic Retinopathy noted in this exam.   -Stressed importance of close BS/BP monitoring, diet/exercise, medication compliance, and regular visits with PCP.   -Discussed getting yearly eye exams.   -Monitor annually.      (H25.813) Combined forms of age-related cataract of both eyes  Plan:   -Discussed signs and symptoms of cataracts.   -Not Visually Significant  -Patient prefers to hold off on cataract surgery at this time.   -Monitor.      (H02.831,  H02.834)  Dermatochalasis of both upper eyelids  Plan:   -Longstanding   -Monitor.    (H52.223) Regular astigmatism of both eyes, (H52.4) Presbyopia  Plan:   -New Glasses Rx Given 06/23/2025.   -Discussed giving a couple of weeks to get adjusted to new glasses.   -Monitor.     Return to clinic for yearly CEE.    Complete documentation of historical and exam elements from today's encounter can be found in the full encounter summary report (not reduplicated in this progress note). I personally obtained the chief complaint(s) and history of present illness. I confirmed and edited as necessary the review of systems, past medical/surgical history, family history, social history, and examination findings as document by others; and I examined the patient myself. I personally reviewed the relevant tests, images, and reports as documented above. I formulated and edited as necessary the assessment and plan and discussed the findings and management plan with the patient and family.    Nilesh Ortiz O.D.    Again, thank you for allowing me to participate in the care of your patient.        Sincerely,        Nilesh Ortiz Jr., OD    Electronically signed

## 2025-06-23 NOTE — PATIENT INSTRUCTIONS
The affects of the dilating drops last for 4- 6 hours.  You will be more sensitive to light and vision will be blurry up close.  Do not drive if you do not feel comfortable.  Mydriatic sunglasses were given if needed.     Patient Education   Diabetes weakens the blood vessels all over the body, including the eyes. Damage to the blood vessels in the eyes can cause swelling or bleeding into part of the eye (called the retina). This is called diabetic retinopathy (LONNIE-tin--pu-thee). If not treated, this disease can cause vision loss or blindness.   Symptoms may include blurred or distorted vision, but many people have no symptoms. It's important to see your eye doctor regularly to check for problems.   Early treatment and good control can help protect your vision. Here are the things you can do to help prevent vision loss:      1. Keep your blood sugar levels under tight control.      2. Bring high blood pressure under control.      3. No smoking.      4. Have yearly dilated eye exams.        Optometry Providers       Clinic Locations                                 Telephone Number   Dr. Paulette Horne   [facility] [phone]     [facility] Hours:                [facility] Hours:       [facility] Hours:   [address]   [address]     [address] [place] [address]   [place] [address]    [place] [address]  Phone: [phone]                    Phone: [phone]     Phone: [phone]                      Monday 8:00-6:00                          Monday 8:00-6:00                          Monday 8:00-6:00              Tuesday 8:00-6:00                          Tuesday 8:00-6:00                          Tuesday 8:00-6:00              Wednesday 8:00-6:00                   Wednesday 8:00-6:00                   Wednesday 8:00-6:00      Thursday 8:00-6:00                        Thursday 8:00-6:00                         Thursday 8:00-6:00            Friday 8:00-5:00                              Friday 8:00-5:00                              Friday 8:00-5:00    [facility] Hours:   [address] [place] [address]  [phone]    Monday 9:00-6:00  Tuesday 9:00-6:00  Wednesday 9:00-6:00  Thursday 9:00-6:00  Friday 9:00-5:00  As always, Thank you for trusting us with your health care needs!

## 2025-06-23 NOTE — TELEPHONE ENCOUNTER
Prior Authorization Approval    Authorization Effective Date: 6/16/2025  Authorization Expiration Date: 6/16/2028  Medication: Continuous Glucose  (FREESTYLE NIKOLE 3 READER) ASPEN  Approved Dose/Quantity:    Reference #:     Insurance Company: [employer] Cell [phone] Fax [phone]  Expected CoPay:       CoPay Card Available:      Foundation Assistance Needed:    Which Pharmacy is filling the prescription (Not needed for infusion/clinic administered): [id number] - [address]  Pharmacy Notified:  Yes  Patient Notified:  **Instructed pharmacy to notify patient when script is ready to /ship.**

## 2025-06-25 DIAGNOSIS — F32.4 MAJOR DEPRESSIVE DISORDER WITH SINGLE EPISODE, IN PARTIAL REMISSION: ICD-10-CM

## 2025-06-25 DIAGNOSIS — E11.42 TYPE 2 DIABETES MELLITUS WITH DIABETIC POLYNEUROPATHY, WITHOUT LONG-TERM CURRENT USE OF INSULIN (H): ICD-10-CM

## 2025-06-26 RX ORDER — BUPROPION HYDROCHLORIDE 300 MG/1
300 TABLET ORAL EVERY MORNING
Qty: 90 TABLET | Refills: 1 | Status: SHIPPED | OUTPATIENT
Start: 2025-06-26

## 2025-06-26 RX ORDER — GLYBURIDE 5 MG/1
10 TABLET ORAL 2 TIMES DAILY WITH MEALS
Qty: 360 TABLET | Refills: 1 | Status: SHIPPED | OUTPATIENT
Start: 2025-06-26

## 2025-06-30 DIAGNOSIS — F32.4 MAJOR DEPRESSIVE DISORDER WITH SINGLE EPISODE, IN PARTIAL REMISSION: ICD-10-CM

## 2025-07-01 RX ORDER — DESVENLAFAXINE 100 MG/1
100 TABLET, EXTENDED RELEASE ORAL DAILY
Qty: 90 TABLET | Refills: 0 | Status: SHIPPED | OUTPATIENT
Start: 2025-07-01

## 2025-07-08 NOTE — PROGRESS NOTES
Medication Therapy Management (MTM) Encounter    ASSESSMENT:                            Medication Adherence/Access: No issues identified.    1. Type 2 diabetes  A1C is not at goal of < 7% but fasting blood glucose is at goal of  mg/dL. At this time, will refrain from making any medication changes until we can review his Wilfredo data. Could consider increasing Mounjaro further if constipation improves otherwise would add SGLT2i.    PLAN:                            1. No changes today  2. Bring Wilfredo with to next visit    Follow-up: Return in 30 days (on 8/8/2025) for Follow up, with me, in person.    SUBJECTIVE/OBJECTIVE:                          Christopher Rodriguez is a 65 year old male called for a follow-up visit.       Reason for visit: follow-up on Mounjaro/constipation    Allergies/ADRs: Reviewed in chart  Past Medical History: Reviewed in chart  Tobacco: He reports that he has never smoked. He has never used smokeless tobacco.  Alcohol: goes weeks without use but can have up to 4 per day sometimes     Medication Adherence/Access: Patient takes medications directly from bottles. Patient takes medications 2 time(s) per day.   Diabetes   Type 2 Diabetes  Metformin IR 1000 mg twice daily   Glyburide 10 mg twice daily   Mounjaro 12.5 mg weekly  Statin: Atorvastatin 40 mg daily    Med history:  Trulicity - not effective at max dose    Tolerating medications well  He tried Miralax for the constipation but it caused loose stools, even at half capful  He feels his constipation has actually improved so is not using Miralax  Denies concerns at this time  Started using Wilfredo sensors and really likes them  Is learning a lot about his food choices  Current diabetes symptoms: none  Diet/Exercise: not moderating carbohydrates in diet, usually eats one big meal in the evenings because he isn't hungry in the mornings.     Blood glucose monitoring: Wilfredo 3+ (using reader), fasting reading was 124 mg/dL today. Doesn't have Wilfredo on  him so he can't provide any data   Eye exam is up to date  Foot exam is up to date    ----------------  I spent 11 minutes with this patient today. All changes were made via collaborative practice agreement with Su Stubbs MD. A copy of the visit note was provided to the patient's provider(s).    A summary of these recommendations was declined by the patient.    Jermaine Mcknight, PharmD, [facility]  Medication Therapy Management Provider  [phone]    Telemedicine Visit Details  The patient's medications can be safely assessed via a telemedicine encounter.  Type of service:  Telephone visit  Originating Location (pt. Location): Home    Distant Location (provider location):  On-site  Start Time: 2:57 PM  End Time: 3:08 PM     Medication Therapy Recommendations  No medication therapy recommendations to display

## 2025-07-09 ENCOUNTER — VIRTUAL VISIT (OUTPATIENT)
Dept: PHARMACY | Facility: CLINIC | Age: 65
End: 2025-07-09
Payer: COMMERCIAL

## 2025-07-09 DIAGNOSIS — E11.42 TYPE 2 DIABETES MELLITUS WITH DIABETIC POLYNEUROPATHY, WITHOUT LONG-TERM CURRENT USE OF INSULIN (H): Primary | ICD-10-CM

## 2025-07-09 PROCEDURE — 99606 MTMS BY PHARM EST 15 MIN: CPT | Mod: 93 | Performed by: PHARMACIST

## 2025-07-09 NOTE — Clinical Note
Patient put Wilfredo sensor on this week so he is going to come in to clinic in August so I can review his data with him and make medication adjustments if needed.

## 2025-07-10 NOTE — PATIENT INSTRUCTIONS
"Recommendations from today's MTM visit:                                                       1. No changes today  2. Bring Wilfredo with to next visit    Follow-up: 8/8/25    It was great speaking with you today.  I value your experience and would be very thankful for your time in providing feedback in our clinic survey. In the next few days, you may receive an email or text message from [facility] [employer] with a link to a survey related to your  clinical pharmacist.\"     To schedule another MTM appointment, please call the clinic directly or you may call the MTM scheduling line at [phone] or toll-free at [phone].     My Clinical Pharmacist's contact information:                                                      Please feel free to contact me with any questions or concerns you have.      Jermaine Mcknight, PharmD, [facility]  Phone: [phone]     "

## 2025-08-06 DIAGNOSIS — E11.42 TYPE 2 DIABETES MELLITUS WITH DIABETIC POLYNEUROPATHY, WITHOUT LONG-TERM CURRENT USE OF INSULIN (H): ICD-10-CM

## 2025-08-06 RX ORDER — TIRZEPATIDE 12.5 MG/.5ML
INJECTION, SOLUTION SUBCUTANEOUS
Qty: 6 ML | Refills: 0 | Status: SHIPPED | OUTPATIENT
Start: 2025-08-06

## 2025-08-08 ENCOUNTER — OFFICE VISIT (OUTPATIENT)
Dept: PHARMACY | Facility: CLINIC | Age: 65
End: 2025-08-08
Payer: COMMERCIAL

## 2025-08-08 VITALS — WEIGHT: 231 LBS | DIASTOLIC BLOOD PRESSURE: 87 MMHG | BODY MASS INDEX: 33.15 KG/M2 | SYSTOLIC BLOOD PRESSURE: 128 MMHG

## 2025-08-08 DIAGNOSIS — E11.42 TYPE 2 DIABETES MELLITUS WITH DIABETIC POLYNEUROPATHY, WITHOUT LONG-TERM CURRENT USE OF INSULIN (H): Primary | ICD-10-CM

## 2025-08-08 PROCEDURE — 3074F SYST BP LT 130 MM HG: CPT | Performed by: PHARMACIST

## 2025-08-08 PROCEDURE — 99606 MTMS BY PHARM EST 15 MIN: CPT | Performed by: PHARMACIST

## 2025-08-08 PROCEDURE — 3079F DIAST BP 80-89 MM HG: CPT | Performed by: PHARMACIST
